# Patient Record
Sex: MALE | Race: WHITE | Employment: FULL TIME | URBAN - METROPOLITAN AREA
[De-identification: names, ages, dates, MRNs, and addresses within clinical notes are randomized per-mention and may not be internally consistent; named-entity substitution may affect disease eponyms.]

---

## 2021-11-28 ENCOUNTER — HOSPITAL ENCOUNTER (EMERGENCY)
Age: 66
Discharge: HOME | End: 2021-11-29
Attending: SPECIALIST
Payer: MEDICARE

## 2021-11-28 DIAGNOSIS — S01.21XA LACERATION OF NOSE, INITIAL ENCOUNTER: ICD-10-CM

## 2021-11-28 DIAGNOSIS — S01.81XA LACERATION OF FOREHEAD, INITIAL ENCOUNTER: Primary | ICD-10-CM

## 2021-11-28 PROCEDURE — 12014 RPR F/E/E/N/L/M 5.1-7.5 CM: CPT

## 2021-11-28 PROCEDURE — 99284 EMERGENCY DEPT VISIT MOD MDM: CPT

## 2021-11-28 ASSESSMENT — PAIN SCALES - GENERAL: PAINLEVEL_OUTOF10: 6

## 2021-11-29 ENCOUNTER — APPOINTMENT (OUTPATIENT)
Dept: GENERAL RADIOLOGY | Age: 66
End: 2021-11-29
Payer: MEDICARE

## 2021-11-29 ENCOUNTER — APPOINTMENT (OUTPATIENT)
Dept: CT IMAGING | Age: 66
End: 2021-11-29
Payer: MEDICARE

## 2021-11-29 VITALS
HEART RATE: 78 BPM | TEMPERATURE: 98.4 F | HEIGHT: 68 IN | OXYGEN SATURATION: 96 % | RESPIRATION RATE: 20 BRPM | SYSTOLIC BLOOD PRESSURE: 136 MMHG | WEIGHT: 155 LBS | DIASTOLIC BLOOD PRESSURE: 83 MMHG | BODY MASS INDEX: 23.49 KG/M2

## 2021-11-29 PROCEDURE — 90471 IMMUNIZATION ADMIN: CPT | Performed by: SPECIALIST

## 2021-11-29 PROCEDURE — 2500000003 HC RX 250 WO HCPCS: Performed by: SPECIALIST

## 2021-11-29 PROCEDURE — 6370000000 HC RX 637 (ALT 250 FOR IP): Performed by: SPECIALIST

## 2021-11-29 PROCEDURE — 72125 CT NECK SPINE W/O DYE: CPT

## 2021-11-29 PROCEDURE — 6360000002 HC RX W HCPCS: Performed by: SPECIALIST

## 2021-11-29 PROCEDURE — 70450 CT HEAD/BRAIN W/O DYE: CPT

## 2021-11-29 PROCEDURE — 90715 TDAP VACCINE 7 YRS/> IM: CPT | Performed by: SPECIALIST

## 2021-11-29 PROCEDURE — 70160 X-RAY EXAM OF NASAL BONES: CPT

## 2021-11-29 RX ORDER — CEPHALEXIN 250 MG/1
500 CAPSULE ORAL ONCE
Status: COMPLETED | OUTPATIENT
Start: 2021-11-29 | End: 2021-11-29

## 2021-11-29 RX ORDER — CEPHALEXIN 500 MG/1
500 CAPSULE ORAL 4 TIMES DAILY
Qty: 28 CAPSULE | Refills: 0 | Status: SHIPPED | OUTPATIENT
Start: 2021-11-29 | End: 2021-12-06

## 2021-11-29 RX ORDER — LIDOCAINE HYDROCHLORIDE AND EPINEPHRINE 10; 10 MG/ML; UG/ML
20 INJECTION, SOLUTION INFILTRATION; PERINEURAL ONCE
Status: COMPLETED | OUTPATIENT
Start: 2021-11-29 | End: 2021-11-29

## 2021-11-29 RX ADMIN — CEPHALEXIN 500 MG: 250 CAPSULE ORAL at 03:36

## 2021-11-29 RX ADMIN — TETANUS TOXOID, REDUCED DIPHTHERIA TOXOID AND ACELLULAR PERTUSSIS VACCINE, ADSORBED 0.5 ML: 5; 2.5; 8; 8; 2.5 SUSPENSION INTRAMUSCULAR at 00:42

## 2021-11-29 RX ADMIN — LIDOCAINE HYDROCHLORIDE AND EPINEPHRINE 20 ML: 10; 10 INJECTION, SOLUTION INFILTRATION; PERINEURAL at 00:42

## 2021-11-29 ASSESSMENT — PAIN SCALES - GENERAL: PAINLEVEL_OUTOF10: 6

## 2021-11-29 NOTE — ED PROVIDER NOTES
Brigid Wilkerson 1778 ENCOUNTER      Pt Name: Lovely Ayala  MRN: [id number]  [place] 1955  Date of evaluation: 11/29/21      CHIEF COMPLAINT       Chief Complaint   Patient presents with    Fall     tripped over a dent in the sidewalk, and had his hands in his pockets and fell hit his nose, nose dived into the cement         HISTORY OF PRESENT ILLNESS    Lovely Ayala is a 78 y.o. male who presents to the emergency department accompanied by his close friend after patient apparently tripped on the uneven sidewalk and had his hands in the pockets of his jacket and hit his nose and forehead onto the cement. No history of loss of consciousness and patient denies any headache except pain at the site of the laceration. He also denies any neck pain, tingling, numbness or weakness in any of the extremities. He also sustained abrasion on the left knee but denies any knee pain and denies any tingling, numbness or weakness distally. Last tetanus injection was in 2007. He denies any chest or abdominal injuries. He grades pain as 6 out of 10 in intensity and there are no exacerbating or relieving factors. He has not taken any medications for the pain prior to arrival.  Patient does not take any anticoagulants or antiplatelet agents and no history of diabetes mellitus. REVIEW OF SYSTEMS       Review of Systems    All systems reviewed and negative unless noted in HPI. The patient denies fever or constitutional symptoms. Denies vision change. Denies any sore throat or rhinorrhea. Denies any neck pain or stiffness. Denies chest pain or shortness of breath. No nausea,  vomiting or diarrhea. Denies any dysuria. Denies urinary frequency or hematuria. Denies musculoskeletal injury or pain. Denies any weakness, numbness or focal neurologic deficit. Denies any skin rash or edema. No recent psychiatric issues. No easy bruising or bleeding.    Denies any polyuria, polydypsia       PAST MEDICAL HISTORY    has no past medical history on file. SURGICAL HISTORY      has no past surgical history on file. CURRENT MEDICATIONS       Discharge Medication List as of 11/29/2021  3:26 AM          ALLERGIES     is allergic to sulfa antibiotics. FAMILY HISTORY     has no family status information on file. family history is not on file. SOCIAL HISTORY          PHYSICAL EXAM     INITIAL VITALS:  height is 5' 8\" (1.727 m) and weight is 70.3 kg (155 lb). His oral temperature is 98.4 °F (36.9 °C). His blood pressure is 136/83 and his pulse is 78. His respiration is 20 and oxygen saturation is 96%. Physical Exam  Vitals and nursing note reviewed. Constitutional:       Appearance: He is well-developed. HENT:      Head: Normocephalic. Laceration present. No raccoon eyes or Joya's sign. Comments: Patient has 2 forehead lacerations jagged margins approximately 4 cm and 1 cm in length and another laceration at the bridge of the nose approximately 2.5 cm in length. No evidence of foreign body, tendon or nerve involvement. Nose: Laceration present. No nasal deformity. Right Nostril: No foreign body, epistaxis or septal hematoma. Left Nostril: No foreign body, epistaxis or septal hematoma. Eyes:      Extraocular Movements: Extraocular movements intact. Pupils: Pupils are equal, round, and reactive to light. Cardiovascular:      Rate and Rhythm: Normal rate and regular rhythm. Heart sounds: Normal heart sounds. No murmur heard. Pulmonary:      Effort: Pulmonary effort is normal. No respiratory distress. Breath sounds: Normal breath sounds. Abdominal:      General: Bowel sounds are normal. There is no distension. Palpations: Abdomen is soft. Tenderness: There is no abdominal tenderness. Musculoskeletal:      Cervical back: Normal range of motion and neck supple. Skin:     General: Skin is warm and dry.       Comments: Superficial abrasion on anterior aspect of the left knee. No evidence of knee effusion. Anterior and posterior drawer test are negative. Neurovascular examination is intact distally. Neurological:      General: No focal deficit present. Mental Status: He is alert and oriented to person, place, and time. GCS: GCS eye subscore is 4. GCS verbal subscore is 5. GCS motor subscore is 6. Cranial Nerves: Cranial nerves are intact. Sensory: Sensation is intact. Motor: Motor function is intact. Coordination: Coordination is intact. DIFFERENTIAL DIAGNOSIS/ MDM:     Lacerations, contusion, fracture, intracranial bleed    DIAGNOSTIC RESULTS     EKG: All EKG's are interpreted by the Emergency Department Physician who either signs or Co-signs this chart in the absence of a cardiologist.    None obtained    RADIOLOGY:   Interpretation per the Radiologist below, if available at the time of this note:    XR NASAL BONE (MIN 3 VIEWS )    Result Date: 11/29/2021  EXAMINATION: THREE XRAY VIEWS OF THE NASAL BONES 11/29/2021 1:06 am COMPARISON: None. HISTORY: ORDERING SYSTEM PROVIDED HISTORY: Fall TECHNOLOGIST PROVIDED HISTORY: Fall Reason for Exam: Pt fell face first on cement when walking. Laceration to nose and forehead Acuity: Acute Type of Exam: Initial FINDINGS: Soft tissue injury identified with tiny radiopaque foreign body just deep to the site of the injury. There are radiopaque calcific densities anterior to the right nasal bone noted however no convincing evidence of acute displaced fracture identified. The remainder of the osseous structures involving the face appearing intact least on the frontal projection provided. Soft tissue injury with findings suspicious for radiopaque calcifications due to laceration. Please correlate exam findings. No definite acute displaced nasal fracture.      CT Head WO Contrast    Result Date: 11/29/2021  EXAMINATION: CT OF THE HEAD WITHOUT CONTRAST  11/29/2021 1:06 am TECHNIQUE: CT of the head was performed without the administration of intravenous contrast. Dose modulation, iterative reconstruction, and/or weight based adjustment of the mA/kV was utilized to reduce the radiation dose to as low as reasonably achievable. COMPARISON: None. HISTORY: ORDERING SYSTEM PROVIDED HISTORY: Head injury TECHNOLOGIST PROVIDED HISTORY: Head injury Decision Support Exception - unselect if not a suspected or confirmed emergency medical condition->Emergency Medical Condition (MA) Reason for Exam: Pt fell face first on cement when walking. Laceration to nose and forehead Acuity: Acute Type of Exam: Initial FINDINGS: BRAIN/VENTRICLES: There is no acute intracranial hemorrhage, mass effect or midline shift. No abnormal extra-axial fluid collection. The gray-white differentiation is maintained without evidence of an acute infarct. There is no evidence of hydrocephalus. Involutional changes. Minor chronic microvascular disease. Intracranial vascular calcifications. ORBITS: The visualized portion of the orbits demonstrate no acute abnormality. SINUSES: Mild ethmoid sinus thickening. Mastoids are clear. SOFT TISSUES/SKULL: No calvarial fracture. Soft tissue injury identified involving the anterior nose with tiny locules of gas. No acute intracranial abnormality. Anterior nasal soft tissue swelling partially visualized. CT Cervical Spine WO Contrast    Result Date: 11/29/2021  EXAMINATION: CT OF THE CERVICAL SPINE WITHOUT CONTRAST 11/29/2021 1:06 am TECHNIQUE: CT of the cervical spine was performed without the administration of intravenous contrast. Multiplanar reformatted images are provided for review. Dose modulation, iterative reconstruction, and/or weight based adjustment of the mA/kV was utilized to reduce the radiation dose to as low as reasonably achievable. COMPARISON: None.  HISTORY: ORDERING SYSTEM PROVIDED HISTORY: Head injury TECHNOLOGIST PROVIDED HISTORY: Head injury Decision Support Exception - unselect if not a suspected or confirmed emergency medical condition->Emergency Medical Condition (MA) Reason for Exam: Pt fell face first on cement when walking. Laceration to nose and forehead Acuity: Acute Type of Exam: Initial FINDINGS: BONES/ALIGNMENT: There is no acute fracture or traumatic malalignment. DEGENERATIVE CHANGES: Severe multilevel degenerate change. Suspect severe canal narrowing multiple levels notably at C4. Anterolisthesis C2 on C3 and C3 on C4 noted. Additional anterolisthesis of C7 on T1 measuring 5 6 mm. Moderate severe multilevel degenerate facet arthropathy. Vascular calcifications. SOFT TISSUES: There is no prevertebral soft tissue swelling. Severe multilevel degenerate change. No acute fracture traumatic malalignment. LABS:  No results found for this visit on 11/28/21. EMERGENCY DEPARTMENT COURSE:   Vitals:    Vitals:    11/28/21 2338 11/29/21 0336   BP: (!) 141/117 136/83   Pulse: 83 78   Resp: 16 20   Temp: 98.4 °F (36.9 °C)    TempSrc: Oral    SpO2: 96% 96%   Weight: 70.3 kg (155 lb)    Height: 5' 8\" (1.727 m)      -------------------------  BP: 136/83, Temp: 98.4 °F (36.9 °C), Pulse: 78, Resp: 20    Orders Placed This Encounter   Medications    Tetanus-Diphth-Acell Pertussis (BOOSTRIX) injection 0.5 mL    lidocaine-EPINEPHrine 1 %[id number] injection 20 mL    cephALEXin (KEFLEX) capsule 500 mg     Order Specific Question:   Antimicrobial Indications     Answer:   Surgical Prophylaxis    cephALEXin (KEFLEX) 500 MG capsule     Sig: Take 1 capsule by mouth 4 times daily for 7 days     Dispense:  28 capsule     Refill:  0         During the ED course, patient was given a Boostrix 0.5 mill intramuscularly and lacerations were suture repaired by myself. Please see procedure note. He was given Keflex 500 mg orally and then discharged home on Keflex for 7 days course.   Wound care instructions were given and patient

## 2024-01-08 ENCOUNTER — APPOINTMENT (OUTPATIENT)
Dept: CT IMAGING | Age: 69
DRG: 472 | End: 2024-01-08
Payer: MEDICARE

## 2024-01-08 ENCOUNTER — HOSPITAL ENCOUNTER (INPATIENT)
Age: 69
LOS: 2 days | Discharge: ANOTHER HOSPITAL | DRG: 472 | End: 2024-01-12
Attending: EMERGENCY MEDICINE | Admitting: STUDENT IN AN ORGANIZED HEALTH CARE EDUCATION/TRAINING PROGRAM
Payer: MEDICARE

## 2024-01-08 ENCOUNTER — APPOINTMENT (OUTPATIENT)
Dept: GENERAL RADIOLOGY | Age: 69
DRG: 472 | End: 2024-01-08
Payer: MEDICARE

## 2024-01-08 DIAGNOSIS — R53.1 GENERALIZED WEAKNESS: ICD-10-CM

## 2024-01-08 DIAGNOSIS — R79.89 ELEVATED TROPONIN: ICD-10-CM

## 2024-01-08 DIAGNOSIS — R21 RASH AND OTHER NONSPECIFIC SKIN ERUPTION: ICD-10-CM

## 2024-01-08 DIAGNOSIS — R29.6 MULTIPLE FALLS: ICD-10-CM

## 2024-01-08 DIAGNOSIS — S42.121A DISPLACED FRACTURE OF ACROMIAL PROCESS, RIGHT SHOULDER, INITIAL ENCOUNTER FOR CLOSED FRACTURE: Primary | ICD-10-CM

## 2024-01-08 PROBLEM — F10.10 ALCOHOL ABUSE, UNCOMPLICATED: Status: ACTIVE | Noted: 2022-05-14

## 2024-01-08 PROBLEM — E78.5 HYPERLIPIDEMIA, UNSPECIFIED: Status: ACTIVE | Noted: 2023-08-02

## 2024-01-08 PROBLEM — I10 ESSENTIAL (PRIMARY) HYPERTENSION: Status: ACTIVE | Noted: 2023-08-02

## 2024-01-08 PROBLEM — R53.81 PHYSICAL DEBILITY: Status: ACTIVE | Noted: 2024-01-08

## 2024-01-08 LAB
ALBUMIN SERPL-MCNC: 4.5 G/DL (ref 3.5–5.2)
ALBUMIN/GLOB SERPL: 1.5 {RATIO} (ref 1–2.5)
ALP SERPL-CCNC: 132 U/L (ref 40–129)
ALT SERPL-CCNC: 29 U/L (ref 5–41)
ANION GAP SERPL CALCULATED.3IONS-SCNC: 14 MMOL/L (ref 9–17)
AST SERPL-CCNC: 39 U/L
BACTERIA URNS QL MICRO: ABNORMAL
BASOPHILS # BLD: 0.07 K/UL (ref 0–0.2)
BASOPHILS NFR BLD: 1 % (ref 0–2)
BILIRUB SERPL-MCNC: 0.6 MG/DL (ref 0.3–1.2)
BILIRUB UR QL STRIP: ABNORMAL
BUN SERPL-MCNC: 21 MG/DL (ref 8–23)
CALCIUM SERPL-MCNC: 9.7 MG/DL (ref 8.6–10.4)
CHARACTER UR: ABNORMAL
CHLORIDE SERPL-SCNC: 97 MMOL/L (ref 98–107)
CLARITY UR: CLEAR
CO2 SERPL-SCNC: 24 MMOL/L (ref 20–31)
COLOR UR: YELLOW
CREAT SERPL-MCNC: 0.6 MG/DL (ref 0.7–1.2)
EOSINOPHIL # BLD: 0.07 K/UL (ref 0–0.4)
EOSINOPHILS RELATIVE PERCENT: 1 % (ref 1–4)
EPI CELLS #/AREA URNS HPF: ABNORMAL /HPF (ref 0–5)
ERYTHROCYTE [DISTWIDTH] IN BLOOD BY AUTOMATED COUNT: 20.5 % (ref 12.5–15.4)
ETHANOL PERCENT: <0.01 %
ETHANOLAMINE SERPL-MCNC: <10 MG/DL
GFR SERPL CREATININE-BSD FRML MDRD: >60 ML/MIN/1.73M2
GLUCOSE SERPL-MCNC: 84 MG/DL (ref 70–99)
GLUCOSE UR STRIP-MCNC: NEGATIVE MG/DL
HCT VFR BLD AUTO: 38.8 % (ref 41–53)
HGB BLD-MCNC: 13 G/DL (ref 13.5–17.5)
HGB UR QL STRIP.AUTO: NEGATIVE
KETONES UR STRIP-MCNC: ABNORMAL MG/DL
LEUKOCYTE ESTERASE UR QL STRIP: NEGATIVE
LYMPHOCYTES NFR BLD: 0.99 K/UL (ref 1–4.8)
LYMPHOCYTES RELATIVE PERCENT: 15 % (ref 24–44)
MAGNESIUM SERPL-MCNC: 2.2 MG/DL (ref 1.6–2.6)
MCH RBC QN AUTO: 25.9 PG (ref 26–34)
MCHC RBC AUTO-ENTMCNC: 33.5 G/DL (ref 31–37)
MCV RBC AUTO: 77.4 FL (ref 80–100)
MONOCYTES NFR BLD: 0.53 K/UL (ref 0.1–1.2)
MONOCYTES NFR BLD: 8 % (ref 2–11)
MORPHOLOGY: ABNORMAL
NEUTROPHILS NFR BLD: 75 % (ref 36–66)
NEUTS SEG NFR BLD: 4.94 K/UL (ref 1.8–7.7)
NITRITE UR QL STRIP: NEGATIVE
PH UR STRIP: 6 [PH] (ref 5–8)
PLATELET # BLD AUTO: 444 K/UL (ref 140–450)
PMV BLD AUTO: 6.7 FL (ref 6–12)
POTASSIUM SERPL-SCNC: 3.9 MMOL/L (ref 3.7–5.3)
PROT SERPL-MCNC: 7.6 G/DL (ref 6.4–8.3)
PROT UR STRIP-MCNC: NEGATIVE MG/DL
RBC # BLD AUTO: 5.02 M/UL (ref 4.5–5.9)
RBC #/AREA URNS HPF: ABNORMAL /HPF (ref 0–2)
SODIUM SERPL-SCNC: 135 MMOL/L (ref 135–144)
SP GR UR STRIP: 1.03 (ref 1–1.03)
TROPONIN I SERPL HS-MCNC: 29 NG/L (ref 0–22)
TROPONIN I SERPL HS-MCNC: 32 NG/L (ref 0–22)
UROBILINOGEN UR STRIP-ACNC: NORMAL EU/DL (ref 0–1)
WBC #/AREA URNS HPF: ABNORMAL /HPF (ref 0–5)
WBC OTHER # BLD: 6.6 K/UL (ref 3.5–11)

## 2024-01-08 PROCEDURE — 6360000002 HC RX W HCPCS: Performed by: NURSE PRACTITIONER

## 2024-01-08 PROCEDURE — 96374 THER/PROPH/DIAG INJ IV PUSH: CPT

## 2024-01-08 PROCEDURE — 99285 EMERGENCY DEPT VISIT HI MDM: CPT

## 2024-01-08 PROCEDURE — 73030 X-RAY EXAM OF SHOULDER: CPT

## 2024-01-08 PROCEDURE — 99222 1ST HOSP IP/OBS MODERATE 55: CPT | Performed by: STUDENT IN AN ORGANIZED HEALTH CARE EDUCATION/TRAINING PROGRAM

## 2024-01-08 PROCEDURE — 6370000000 HC RX 637 (ALT 250 FOR IP): Performed by: STUDENT IN AN ORGANIZED HEALTH CARE EDUCATION/TRAINING PROGRAM

## 2024-01-08 PROCEDURE — 96372 THER/PROPH/DIAG INJ SC/IM: CPT

## 2024-01-08 PROCEDURE — 71045 X-RAY EXAM CHEST 1 VIEW: CPT

## 2024-01-08 PROCEDURE — 73562 X-RAY EXAM OF KNEE 3: CPT

## 2024-01-08 PROCEDURE — G0480 DRUG TEST DEF 1-7 CLASSES: HCPCS

## 2024-01-08 PROCEDURE — G0378 HOSPITAL OBSERVATION PER HR: HCPCS

## 2024-01-08 PROCEDURE — 84550 ASSAY OF BLOOD/URIC ACID: CPT

## 2024-01-08 PROCEDURE — 1200000000 HC SEMI PRIVATE

## 2024-01-08 PROCEDURE — 36415 COLL VENOUS BLD VENIPUNCTURE: CPT

## 2024-01-08 PROCEDURE — 2580000003 HC RX 258: Performed by: STUDENT IN AN ORGANIZED HEALTH CARE EDUCATION/TRAINING PROGRAM

## 2024-01-08 PROCEDURE — 85025 COMPLETE CBC W/AUTO DIFF WBC: CPT

## 2024-01-08 PROCEDURE — 83735 ASSAY OF MAGNESIUM: CPT

## 2024-01-08 PROCEDURE — 81001 URINALYSIS AUTO W/SCOPE: CPT

## 2024-01-08 PROCEDURE — 84484 ASSAY OF TROPONIN QUANT: CPT

## 2024-01-08 PROCEDURE — 70450 CT HEAD/BRAIN W/O DYE: CPT

## 2024-01-08 PROCEDURE — 6360000002 HC RX W HCPCS: Performed by: STUDENT IN AN ORGANIZED HEALTH CARE EDUCATION/TRAINING PROGRAM

## 2024-01-08 PROCEDURE — 80053 COMPREHEN METABOLIC PANEL: CPT

## 2024-01-08 PROCEDURE — 6370000000 HC RX 637 (ALT 250 FOR IP): Performed by: NURSE PRACTITIONER

## 2024-01-08 PROCEDURE — 72125 CT NECK SPINE W/O DYE: CPT

## 2024-01-08 PROCEDURE — 93005 ELECTROCARDIOGRAM TRACING: CPT | Performed by: NURSE PRACTITIONER

## 2024-01-08 RX ORDER — POTASSIUM CHLORIDE 20 MEQ/1
40 TABLET, EXTENDED RELEASE ORAL PRN
Status: DISCONTINUED | OUTPATIENT
Start: 2024-01-08 | End: 2024-01-12 | Stop reason: HOSPADM

## 2024-01-08 RX ORDER — ATORVASTATIN CALCIUM 10 MG/1
10 TABLET, FILM COATED ORAL DAILY
Status: DISCONTINUED | OUTPATIENT
Start: 2024-01-08 | End: 2024-01-12 | Stop reason: HOSPADM

## 2024-01-08 RX ORDER — POLYETHYLENE GLYCOL 3350 17 G/17G
17 POWDER, FOR SOLUTION ORAL DAILY PRN
Status: DISCONTINUED | OUTPATIENT
Start: 2024-01-08 | End: 2024-01-12 | Stop reason: HOSPADM

## 2024-01-08 RX ORDER — ATORVASTATIN CALCIUM 10 MG/1
10 TABLET, FILM COATED ORAL NIGHTLY
COMMUNITY
Start: 2023-12-30

## 2024-01-08 RX ORDER — SODIUM CHLORIDE 9 MG/ML
INJECTION, SOLUTION INTRAVENOUS PRN
Status: DISCONTINUED | OUTPATIENT
Start: 2024-01-08 | End: 2024-01-12 | Stop reason: HOSPADM

## 2024-01-08 RX ORDER — PREDNISONE 20 MG/1
20 TABLET ORAL DAILY
Status: DISCONTINUED | OUTPATIENT
Start: 2024-01-09 | End: 2024-01-09

## 2024-01-08 RX ORDER — MAGNESIUM SULFATE IN WATER 40 MG/ML
2000 INJECTION, SOLUTION INTRAVENOUS PRN
Status: DISCONTINUED | OUTPATIENT
Start: 2024-01-08 | End: 2024-01-12 | Stop reason: HOSPADM

## 2024-01-08 RX ORDER — SODIUM CHLORIDE 0.9 % (FLUSH) 0.9 %
5-40 SYRINGE (ML) INJECTION EVERY 12 HOURS SCHEDULED
Status: DISCONTINUED | OUTPATIENT
Start: 2024-01-08 | End: 2024-01-12 | Stop reason: HOSPADM

## 2024-01-08 RX ORDER — ACETAMINOPHEN 650 MG/1
650 SUPPOSITORY RECTAL EVERY 6 HOURS PRN
Status: DISCONTINUED | OUTPATIENT
Start: 2024-01-08 | End: 2024-01-12 | Stop reason: HOSPADM

## 2024-01-08 RX ORDER — SODIUM CHLORIDE 0.9 % (FLUSH) 0.9 %
5-40 SYRINGE (ML) INJECTION PRN
Status: DISCONTINUED | OUTPATIENT
Start: 2024-01-08 | End: 2024-01-12 | Stop reason: HOSPADM

## 2024-01-08 RX ORDER — POTASSIUM CHLORIDE 7.45 MG/ML
10 INJECTION INTRAVENOUS PRN
Status: DISCONTINUED | OUTPATIENT
Start: 2024-01-08 | End: 2024-01-12 | Stop reason: HOSPADM

## 2024-01-08 RX ORDER — HYDROCODONE BITARTRATE AND ACETAMINOPHEN 5; 325 MG/1; MG/1
1 TABLET ORAL EVERY 6 HOURS PRN
Status: DISCONTINUED | OUTPATIENT
Start: 2024-01-08 | End: 2024-01-12 | Stop reason: HOSPADM

## 2024-01-08 RX ORDER — ACETAMINOPHEN 325 MG/1
650 TABLET ORAL EVERY 6 HOURS PRN
Status: DISCONTINUED | OUTPATIENT
Start: 2024-01-08 | End: 2024-01-12 | Stop reason: HOSPADM

## 2024-01-08 RX ORDER — ONDANSETRON 4 MG/1
4 TABLET, ORALLY DISINTEGRATING ORAL EVERY 8 HOURS PRN
Status: DISCONTINUED | OUTPATIENT
Start: 2024-01-08 | End: 2024-01-12 | Stop reason: HOSPADM

## 2024-01-08 RX ORDER — HYDROCODONE BITARTRATE AND ACETAMINOPHEN 5; 325 MG/1; MG/1
2 TABLET ORAL EVERY 6 HOURS PRN
Status: DISCONTINUED | OUTPATIENT
Start: 2024-01-08 | End: 2024-01-12 | Stop reason: HOSPADM

## 2024-01-08 RX ORDER — ONDANSETRON 2 MG/ML
4 INJECTION INTRAMUSCULAR; INTRAVENOUS EVERY 6 HOURS PRN
Status: DISCONTINUED | OUTPATIENT
Start: 2024-01-08 | End: 2024-01-12 | Stop reason: HOSPADM

## 2024-01-08 RX ORDER — ENOXAPARIN SODIUM 100 MG/ML
40 INJECTION SUBCUTANEOUS DAILY
Status: DISCONTINUED | OUTPATIENT
Start: 2024-01-08 | End: 2024-01-12 | Stop reason: HOSPADM

## 2024-01-08 RX ORDER — IBUPROFEN 600 MG/1
600 TABLET ORAL EVERY 6 HOURS PRN
Status: ON HOLD | COMMUNITY
Start: 2024-01-04 | End: 2024-01-19 | Stop reason: HOSPADM

## 2024-01-08 RX ADMIN — SODIUM CHLORIDE, PRESERVATIVE FREE 10 ML: 5 INJECTION INTRAVENOUS at 21:17

## 2024-01-08 RX ADMIN — ATORVASTATIN CALCIUM 10 MG: 10 TABLET, FILM COATED ORAL at 21:16

## 2024-01-08 RX ADMIN — METHYLPREDNISOLONE SODIUM SUCCINATE 60 MG: 125 INJECTION, POWDER, FOR SOLUTION INTRAMUSCULAR; INTRAVENOUS at 16:17

## 2024-01-08 RX ADMIN — HYDROCODONE BITARTRATE AND ACETAMINOPHEN 2 TABLET: 5; 325 TABLET ORAL at 21:15

## 2024-01-08 RX ADMIN — ENOXAPARIN SODIUM 40 MG: 100 INJECTION SUBCUTANEOUS at 21:16

## 2024-01-08 ASSESSMENT — ENCOUNTER SYMPTOMS
CONSTIPATION: 0
SHORTNESS OF BREATH: 0
NAUSEA: 0
BACK PAIN: 0
VOMITING: 0
EYE DISCHARGE: 0
COUGH: 0
DIARRHEA: 0
ABDOMINAL PAIN: 0

## 2024-01-08 ASSESSMENT — PAIN SCALES - GENERAL
PAINLEVEL_OUTOF10: 8
PAINLEVEL_OUTOF10: 3
PAINLEVEL_OUTOF10: 8

## 2024-01-08 ASSESSMENT — PAIN - FUNCTIONAL ASSESSMENT: PAIN_FUNCTIONAL_ASSESSMENT: 0-10

## 2024-01-08 NOTE — H&P
[facility]  Office: [phone]  Chapin Muse DO, Cyrus Dunn DO, Franky Jules DO, Danny Sexton DO, Luis Angel Gracia MD, Julee Quiñones MD, Delroy Landeros MD, Madison Collier MD,  Tony Rosales MD, Joni Dubois MD, Soila Oliva MD,  Neftali Larkin DO, Alvaro Maria MD, Juan Luis Faustin MD, Torin Muse DO, Eden Barajas MD,  Marquis Lemus DO, Yarely Molina MD, Maya West MD, Karen Adler MD, Shyam Beckman MD,  Rojas Neri MD, Axel Tello MD, Ryan Batista MD, Tima Hoffmann MD, Michael Grimes MD, Angus Hunt MD, Ramiro Burns DO, Bhupinder Mao DO, Mari Villasenor MD,  Davion Graves MD, Shirley Waterhouse, CNP,  Alycia De La Fuente, CNP, Tavon Wtakins, CNP,  Katharine Merrill, LACHO, Laura Small, CNP, Brenda Jade, CNP, Malena Rossi CNP, Marya Watson, CNP, Kortney Wilson, CNP, Zabrina Obrien, PA-C, Soila Barber PA-C, Tia Mohan, CNP, Betina Saldivar, CNS, Carmen Worley, CNP, Mamie Crain, CNP, Tracy Schwab, CNP         [facility]   IN-PATIENT SERVICE   [facility]    HISTORY AND PHYSICAL EXAMINATION            Date:   1/8/2024  Patient name:  Moises Triplett  Date of admission:  1/8/2024  1:31 PM  MRN:   [id number]  Account:  [id number]  YOB: 1955  PCP:    No primary care provider on file.  Room:   ER11/ER11  Code Status:    No Order      History Obtained From:     patient    History of Present Illness:     Moises Triplett is a 68 y.o. male with a past medical history of hyperlipidemia who presented to the emergency department on 1/8/2024 complaining of frequent falls at home. The patient states that he has fallen several times over the past few days and feels like he needs to go to rehab again. He endorses frequent headaches and dizziness that he believes are causing his falls. In the ED, the patient was afebrile and nontoxic appearing. CT head and cervical spine were negative for an acute abnormality. X-ray of the right shoulder  joint. Right knee: 1. Mild degenerative changes of the medial compartment. 2. No acute fracture or dislocation. Left knee: 1. Mild degenerative change of the medial compartment. 2. No acute fracture or dislocation.     XR CHEST PORTABLE    Result Date: 1/8/2024  No acute cardiopulmonary process.     CT CERVICAL SPINE WO CONTRAST    Result Date: 1/8/2024  1. No definite acute osseous abnormality of the cervical spine. 2. Extensive multilevel DJD/DDD changes, slightly increased as compared to 2021 with associated findings, as above.     CT Head W/O Contrast    Result Date: 1/8/2024  No acute intracranial abnormality.       Assessment :      Hospital Problems             Last Modified POA    * (Principal) Physical debility 1/8/2024 Yes    Alcohol abuse, uncomplicated 1/8/2024 Yes    Essential (primary) hypertension 1/8/2024 Yes    Hyperlipidemia, unspecified 1/8/2024 Yes       Plan:     Physical debility   -The patient states that he has been falling frequently and does not feel that he can be safely discharged home   -The patient is interested in ARU placement   -The patient states that his falls are secondary to headaches and dizziness   -MRI of the brain and cervical spine are pending   -Will consult neurology per patient request     Right scapula fracture   -X-ray of the right shoulder is showing a suspected nondisplaced fracture at the junction of the acromion and scapula   -This is likely a non-operative fracture   -Consult orthopedic surgery; appreciate recommendations     Elevated troponin   -The patient is chest-pain free  -Will obtain an echocardiogram to assess for wall motion abnormalities     Dermatitis   -The patient has a faint rash on his trunk and extremities   -Start prednisone 40 mg daily and monitor for improvement     Patient is admitted as observation status. Expected length of stay < 48 hours. Patient is admitted in the Med/Surge      Juan Luis Faustin MD  1/8/2024  6:24 PM    Copy sent to Dr. Deluna  primary care provider on file.

## 2024-01-08 NOTE — ED PROVIDER NOTES
[address]    [address] [facility] [address]  Phone: [phone]  Emergency Department  Faculty Attestation    I performed a history and physical examination of the patient and discussed management with the mid level provider. I reviewed the mid level provider's note and agree with the documented findings and plan of care. Any areas of disagreement are noted on the chart. I was personally present for the key portions of any procedures. I have documented in the chart those procedures where I was not present during the key portions. I have reviewed the emergency nurses triage note. I agree with the chief complaint, past medical history, past surgical history, allergies, medications, social and family history as documented unless otherwise noted below. Documentation of the HPI, Physical Exam and Medical Decision Making performed by medical students or scribes is based on my personal performance of the HPI, PE and MDM. For Physician Assistant/ Nurse Practitioner cases/documentation I have personally evaluated this patient and have completed at least one if not all key elements of the E/M (history, physical exam, and MDM). Additional findings are as noted.      Primary Care Physician:  No primary care provider on file.    CHIEF COMPLAINT       Chief Complaint   Patient presents with    Fall     Fall in shower, has had increase in falls over the past month only pain is R knee       RECENT VITALS:   Temp: 97.9 °F (36.6 °C),  Pulse: 77, Respirations: 18, BP: 121/76    LABS:  Labs Reviewed   CBC WITH AUTO DIFFERENTIAL - Abnormal; Notable for the following components:       Result Value    Hemoglobin 13.0 (*)     Hematocrit 38.8 (*)     MCV 77.4 (*)     MCH 25.9 (*)     RDW 20.5 (*)     Neutrophils % 75 (*)     Lymphocytes % 15 (*)     Lymphocytes Absolute 0.99 (*)     All other components within normal limits   COMPREHENSIVE METABOLIC PANEL - Abnormal; Notable for the following components:    Chloride 97  (*)     Creatinine 0.6 (*)     Alkaline Phosphatase 132 (*)     All other components within normal limits   URINALYSIS - Abnormal; Notable for the following components:    Bilirubin Urine SMALL (*)     Ketones, Urine MODERATE (*)     All other components within normal limits   TROPONIN - Abnormal; Notable for the following components:    Troponin, High Sensitivity 32 (*)     All other components within normal limits   TROPONIN - Abnormal; Notable for the following components:    Troponin, High Sensitivity 29 (*)     All other components within normal limits   BASIC METABOLIC PANEL W/ REFLEX TO MG FOR LOW K - Abnormal; Notable for the following components:    Sodium 134 (*)     BUN 26 (*)     Creatinine 0.6 (*)     All other components within normal limits   CBC WITH AUTO DIFFERENTIAL - Abnormal; Notable for the following components:    Hemoglobin 12.1 (*)     Hematocrit 35.9 (*)     MCV 77.4 (*)     RDW 20.5 (*)     Platelets 478 (*)     Neutrophils % 82 (*)     Lymphocytes % 17 (*)     Eosinophils % 0 (*)     Lymphocytes Absolute 0.88 (*)     Monocytes Absolute 0.05 (*)     All other components within normal limits   MICROSCOPIC URINALYSIS - Abnormal; Notable for the following components:    Other Observations UA   (*)     Value: Utilizing a urinalysis as the only screening method to exclude a potential uropathogen can be unreliable in many patient populations.  Rapid screening tests are less sensitive than culture and if UTI is a clinical possibility, culture should be considered despite a negative urinalysis.      All other components within normal limits   MAGNESIUM   ETHANOL   VITAMIN B12 & FOLATE   URIC ACID        XR CHEST PORTABLE (Final result)  Result time 01/08/24 15:12:00  Final result by Desmond Marks MD (01/08/24 15:12:00)                Impression:    No acute cardiopulmonary process.             Narrative:    EXAMINATION:   ONE XRAY VIEW OF THE CHEST     1/8/2024 2:26 pm     COMPARISON:   None.  acute intracranial   hemorrhage, mass effect or midline shift.  No abnormal extra-axial fluid   collection.  The gray-white differentiation is maintained without evidence of   an acute infarct.  There is mild diffuse parenchymal atrophy.  There is no   evidence of hydrocephalus. Mild bilateral periventricular hypodensities are   nonspecific, but compatible with chronic microvascular ischemic change.     ORBITS: The visualized portion of the orbits demonstrate no acute abnormality.     SINUSES: The visualized paranasal sinuses and mastoid air cells demonstrate   no acute abnormality.     SOFT TISSUES/SKULL:  No acute abnormality of the visualized skull or soft   tissues.                       CT CERVICAL SPINE WO CONTRAST (Final result)  Result time 01/08/24 15:06:27  Procedure changed from XR CERVICAL SPINE (2-3 VIEWS)  Final result by Terence Hay MD (01/08/24 15:06:27)                Impression:    1. No definite acute osseous abnormality of the cervical spine.   2. Extensive multilevel DJD/DDD changes, slightly increased as compared to   2021 with associated findings, as above.             Narrative:    EXAMINATION:   CT OF THE CERVICAL SPINE WITHOUT CONTRAST 1/8/2024 2:25 pm     TECHNIQUE:   CT of the cervical spine was performed without the administration of   intravenous contrast. Multiplanar reformatted images are provided for review.   Automated exposure control, iterative reconstruction, and/or weight based   adjustment of the mA/kV was utilized to reduce the radiation dose to as low   as reasonably achievable.     COMPARISON:   CT of the cervical spine from 11/29/2021     HISTORY:   ORDERING SYSTEM PROVIDED HISTORY: fall injury pain   TECHNOLOGIST PROVIDED HISTORY:   fall injury pain   Decision Support Exception - unselect if not a suspected or confirmed   emergency medical condition->Emergency Medical Condition (MA)   Reason for Exam: pt states repetitive falls, headaches, pain     FINDINGS:

## 2024-01-08 NOTE — ED PROVIDER NOTES
[address]  EMERGENCY DEPARTMENT ENCOUNTER      Pt Name: Moises Triplett  MRN: [id number]  Birthdate 1955  Date of evaluation: 1/8/2024  Provider: BRITTA eHdrick CNP  9:01 PM    CHIEF COMPLAINT       Chief Complaint   Patient presents with    Fall     Fall in shower, has had increase in falls over the past month only pain is R knee         HISTORY OF PRESENT ILLNESS    Moises Triplett is a 68 y.o. male who presents to the emergency department      This is a nontoxic but chronically ill-appearing 68-year-old male being sent to the emergency department via EMS from home where he lives alone, he was found on the ground near his shower by his home health aide, reports that he has had 3 falls this week and has had increasing weakness, denies that he had loss of consciousness; he follows with wound care for ongoing wound on left ankle at [facility], reports that he did follows with rheumatology at [facility] as well, he has been told that he has rheumatoid arthritis but also has been told that he does not and that he has osteoarthritis he has acute on chronic right shoulder pain post one of his falls this week, he is not anticoagulated, he denies any has had any recent fevers or chills cough or cold symptoms, states that he does have slight headache but not the worst headache of his life or sudden onset headache.  The patient reports that he has increase in bilateral knee pain as well that is making mobility difficult, he has a walker at home.  The patient admits to drinking 1 beer daily.  Admits to marijuana smoking daily.  Is a reformed tobacco smoker.  The patient's brother is at the bedside with the patient.    The history is provided by the patient and a relative.       Nursing Notes were reviewed.    REVIEW OF SYSTEMS       Review of Systems   Constitutional:  Positive for fatigue. Negative for chills and fever.        Positive for mechanical falls multiple over the past week.   HENT:  Negative for congestion, ear pain  abnormalities does not show any leukocytosis, CMP negative for acute abnormalities, high-sensitivity troponin initially 32 with 2-hour high-sensitivity troponin was added to the patient's workup.  Normal magnesium level negative serum alcohol level, urinalysis remains pending.    One-view portable chest x-ray shows no acute cardiopulmonary abnormalities.    Right shoulder x-ray shows suspected nondisplaced fracture at the junction of the acromion and scapula severe right glenohumeral osteoarthritis moderate to severe degenerative changes right AC joint.  Bilateral knees x-rays show mild degenerative changes of the medial compartment no acute fractures or dislocation.    Right upper extremity sling ordered.    CT of the brain without contrast shows no acute intracranial abnormalities.    CT of the cervical spine without contrast shows no definite acute osseous abnormalities of the cervical spine extensive multilevel degenerative disc disease/degenerative joint disease slightly increased compared to the 2021 associated findings.    The patient was updated on all the diagnostic test findings as well as the brother at the bedside, due to the patient having multiple falls, increasing generalized weakness, discussed the case with the hospitalist physician Dr. Faustin who also evaluated the patient in the emergency department, updated on HPI, diagnostic test findings, assessment findings, is agreeable for observation admission to the hospital.  Urinalysis remains pending at the time of admission.    Amount and/or Complexity of Data Reviewed  Labs: ordered.  Radiology: ordered.  ECG/medicine tests: ordered.    Risk  Prescription drug management.  Decision regarding hospitalization.            REASSESSMENT          CRITICAL CARE TIME   Total Critical Care time was  minutes, excluding separately reportable procedures.  There was a high probability of clinically significant/life threatening deterioration in the patient's  condition which required my urgent intervention.      CONSULTS:  IP CONSULT TO ORTHOPEDIC SURGERY  IP CONSULT TO NEUROLOGY    PROCEDURES:  Unless otherwise noted below, none     Procedures        FINAL IMPRESSION      1. Displaced fracture of acromial process, right shoulder, initial encounter for closed fracture    2. Multiple falls    3. Generalized weakness    4. Rash and other nonspecific skin eruption    5. Elevated troponin          DISPOSITION/PLAN   DISPOSITION Admitted 01/08/2024 04:46:32 PM      PATIENT REFERRED TO:  No follow-up provider specified.    DISCHARGE MEDICATIONS:  Current Discharge Medication List        Controlled Substances Monitoring:          No data to display                (Please note that portions of this note were completed with a voice recognition program.  Efforts were made to edit the dictations but occasionally words are mis-transcribed.)    BRITTA Hedrick CNP (electronically signed)  Attending Emergency Physician           Patria Jarvis APRN - CNP  01/08/24 7918

## 2024-01-09 ENCOUNTER — APPOINTMENT (OUTPATIENT)
Dept: MRI IMAGING | Age: 69
DRG: 472 | End: 2024-01-09
Payer: MEDICARE

## 2024-01-09 ENCOUNTER — APPOINTMENT (OUTPATIENT)
Age: 69
DRG: 472 | End: 2024-01-09
Attending: STUDENT IN AN ORGANIZED HEALTH CARE EDUCATION/TRAINING PROGRAM
Payer: MEDICARE

## 2024-01-09 PROBLEM — S42.121A: Status: ACTIVE | Noted: 2024-01-09

## 2024-01-09 LAB
25(OH)D3 SERPL-MCNC: 32.1 NG/ML
AMPHET UR QL SCN: NEGATIVE
ANION GAP SERPL CALCULATED.3IONS-SCNC: 11 MMOL/L (ref 9–17)
BARBITURATES UR QL SCN: NEGATIVE
BASOPHILS # BLD: 0 K/UL (ref 0–0.2)
BASOPHILS NFR BLD: 0 % (ref 0–2)
BENZODIAZ UR QL: NEGATIVE
BUN SERPL-MCNC: 26 MG/DL (ref 8–23)
CALCIUM SERPL-MCNC: 9.1 MG/DL (ref 8.6–10.4)
CANNABINOIDS UR QL SCN: POSITIVE
CHLORIDE SERPL-SCNC: 99 MMOL/L (ref 98–107)
CO2 SERPL-SCNC: 24 MMOL/L (ref 20–31)
COCAINE UR QL SCN: NEGATIVE
CREAT SERPL-MCNC: 0.6 MG/DL (ref 0.7–1.2)
ECHO AO ROOT DIAM: 3.7 CM
ECHO AO ROOT INDEX: 2.1 CM/M2
ECHO AV AREA PEAK VELOCITY: 2.6 CM2
ECHO AV AREA VTI: 2.4 CM2
ECHO AV AREA/BSA PEAK VELOCITY: 1.5 CM2/M2
ECHO AV AREA/BSA VTI: 1.4 CM2/M2
ECHO AV MEAN GRADIENT: 5 MMHG
ECHO AV MEAN VELOCITY: 1.1 M/S
ECHO AV PEAK GRADIENT: 10 MMHG
ECHO AV PEAK VELOCITY: 1.6 M/S
ECHO AV VELOCITY RATIO: 0.75
ECHO AV VTI: 34.7 CM
ECHO BSA: 1.75 M2
ECHO EST RA PRESSURE: 5 MMHG
ECHO IVC PROX: 1.2 CM
ECHO LA AREA 2C: 18.7 CM2
ECHO LA AREA 4C: 13.5 CM2
ECHO LA DIAMETER INDEX: 2.33 CM/M2
ECHO LA DIAMETER: 4.1 CM
ECHO LA MAJOR AXIS: 4.5 CM
ECHO LA MINOR AXIS: 5.6 CM
ECHO LA TO AORTIC ROOT RATIO: 1.11
ECHO LA VOL BP: 45 ML (ref 18–58)
ECHO LA VOL MOD A2C: 51 ML (ref 18–58)
ECHO LA VOL MOD A4C: 33 ML (ref 18–58)
ECHO LA VOL/BSA BIPLANE: 26 ML/M2 (ref 16–34)
ECHO LA VOLUME INDEX MOD A2C: 29 ML/M2 (ref 16–34)
ECHO LA VOLUME INDEX MOD A4C: 19 ML/M2 (ref 16–34)
ECHO LV E' LATERAL VELOCITY: 18 CM/S
ECHO LV E' SEPTAL VELOCITY: 11 CM/S
ECHO LV EDV A2C: 65 ML
ECHO LV EDV A4C: 66 ML
ECHO LV EDV INDEX A4C: 38 ML/M2
ECHO LV EDV NDEX A2C: 37 ML/M2
ECHO LV EJECTION FRACTION A2C: 58 %
ECHO LV EJECTION FRACTION A4C: 54 %
ECHO LV EJECTION FRACTION BIPLANE: 55 % (ref 55–100)
ECHO LV ESV A2C: 28 ML
ECHO LV ESV A4C: 30 ML
ECHO LV ESV INDEX A2C: 16 ML/M2
ECHO LV ESV INDEX A4C: 17 ML/M2
ECHO LV FRACTIONAL SHORTENING: 35 % (ref 28–44)
ECHO LV INTERNAL DIMENSION DIASTOLE INDEX: 2.44 CM/M2
ECHO LV INTERNAL DIMENSION DIASTOLIC: 4.3 CM (ref 4.2–5.9)
ECHO LV INTERNAL DIMENSION SYSTOLIC INDEX: 1.59 CM/M2
ECHO LV INTERNAL DIMENSION SYSTOLIC: 2.8 CM
ECHO LV IVSD: 1.1 CM (ref 0.6–1)
ECHO LV MASS 2D: 162.9 G (ref 88–224)
ECHO LV MASS INDEX 2D: 92.6 G/M2 (ref 49–115)
ECHO LV POSTERIOR WALL DIASTOLIC: 1.1 CM (ref 0.6–1)
ECHO LV RELATIVE WALL THICKNESS RATIO: 0.51
ECHO LVOT AREA: 3.5 CM2
ECHO LVOT AV VTI INDEX: 0.7
ECHO LVOT DIAM: 2.1 CM
ECHO LVOT MEAN GRADIENT: 3 MMHG
ECHO LVOT PEAK GRADIENT: 5 MMHG
ECHO LVOT PEAK VELOCITY: 1.2 M/S
ECHO LVOT STROKE VOLUME INDEX: 47.6 ML/M2
ECHO LVOT SV: 83.8 ML
ECHO LVOT VTI: 24.2 CM
ECHO MV A VELOCITY: 0.76 M/S
ECHO MV AREA VTI: 2.7 CM2
ECHO MV E DECELERATION TIME (DT): 338 MS
ECHO MV E VELOCITY: 0.65 M/S
ECHO MV E/A RATIO: 0.86
ECHO MV E/E' LATERAL: 3.61
ECHO MV E/E' RATIO (AVERAGED): 4.76
ECHO MV LVOT VTI INDEX: 1.28
ECHO MV MAX VELOCITY: 1.2 M/S
ECHO MV MEAN GRADIENT: 2 MMHG
ECHO MV MEAN VELOCITY: 0.7 M/S
ECHO MV PEAK GRADIENT: 5 MMHG
ECHO MV VTI: 30.9 CM
ECHO PV MAX VELOCITY: 1 M/S
ECHO PV PEAK GRADIENT: 4 MMHG
ECHO RA AREA 4C: 12.2 CM2
ECHO RA END SYSTOLIC VOLUME APICAL 4 CHAMBER INDEX BSA: 13 ML/M2
ECHO RA VOLUME: 22 ML
ECHO RIGHT VENTRICULAR SYSTOLIC PRESSURE (RVSP): 30 MMHG
ECHO RV BASAL DIMENSION: 3.2 CM
ECHO RV FREE WALL PEAK S': 19 CM/S
ECHO RV TAPSE: 3.1 CM (ref 1.7–?)
ECHO TV PEAK GRADIENT: 2 MMHG
ECHO TV REGURGITANT MAX VELOCITY: 2.51 M/S
ECHO TV REGURGITANT PEAK GRADIENT: 25 MMHG
EKG ATRIAL RATE: 82 BPM
EKG P AXIS: 64 DEGREES
EKG P-R INTERVAL: 120 MS
EKG Q-T INTERVAL: 400 MS
EKG QRS DURATION: 90 MS
EKG QTC CALCULATION (BAZETT): 467 MS
EKG R AXIS: -19 DEGREES
EKG T AXIS: 74 DEGREES
EKG VENTRICULAR RATE: 82 BPM
EOSINOPHIL # BLD: 0 K/UL (ref 0–0.4)
EOSINOPHILS RELATIVE PERCENT: 0 % (ref 1–4)
ERYTHROCYTE [DISTWIDTH] IN BLOOD BY AUTOMATED COUNT: 20.5 % (ref 12.5–15.4)
FENTANYL UR QL: NEGATIVE
FOLATE SERPL-MCNC: 16.8 NG/ML
GFR SERPL CREATININE-BSD FRML MDRD: >60 ML/MIN/1.73M2
GLUCOSE SERPL-MCNC: 94 MG/DL (ref 70–99)
HCT VFR BLD AUTO: 35.9 % (ref 41–53)
HGB BLD-MCNC: 12.1 G/DL (ref 13.5–17.5)
LYMPHOCYTES NFR BLD: 0.88 K/UL (ref 1–4.8)
LYMPHOCYTES RELATIVE PERCENT: 17 % (ref 24–44)
MCH RBC QN AUTO: 26 PG (ref 26–34)
MCHC RBC AUTO-ENTMCNC: 33.6 G/DL (ref 31–37)
MCV RBC AUTO: 77.4 FL (ref 80–100)
METHADONE UR QL: NEGATIVE
MONOCYTES NFR BLD: 0.05 K/UL (ref 0.1–0.8)
MONOCYTES NFR BLD: 1 % (ref 1–7)
MORPHOLOGY: ABNORMAL
NEUTROPHILS NFR BLD: 82 % (ref 36–66)
NEUTS SEG NFR BLD: 4.27 K/UL (ref 1.8–7.7)
OPIATES UR QL SCN: POSITIVE
OXYCODONE UR QL SCN: NEGATIVE
PCP UR QL SCN: NEGATIVE
PLATELET # BLD AUTO: 478 K/UL (ref 140–450)
PMV BLD AUTO: 6.8 FL (ref 6–12)
POTASSIUM SERPL-SCNC: 4 MMOL/L (ref 3.7–5.3)
RBC # BLD AUTO: 4.64 M/UL (ref 4.5–5.9)
SODIUM SERPL-SCNC: 134 MMOL/L (ref 135–144)
TEST INFORMATION: ABNORMAL
VIT B12 SERPL-MCNC: 992 PG/ML (ref 232–1245)
WBC OTHER # BLD: 5.2 K/UL (ref 3.5–11)

## 2024-01-09 PROCEDURE — G0378 HOSPITAL OBSERVATION PER HR: HCPCS

## 2024-01-09 PROCEDURE — 85025 COMPLETE CBC W/AUTO DIFF WBC: CPT

## 2024-01-09 PROCEDURE — 97530 THERAPEUTIC ACTIVITIES: CPT

## 2024-01-09 PROCEDURE — 84425 ASSAY OF VITAMIN B-1: CPT

## 2024-01-09 PROCEDURE — 70553 MRI BRAIN STEM W/O & W/DYE: CPT

## 2024-01-09 PROCEDURE — 2580000003 HC RX 258: Performed by: STUDENT IN AN ORGANIZED HEALTH CARE EDUCATION/TRAINING PROGRAM

## 2024-01-09 PROCEDURE — 97166 OT EVAL MOD COMPLEX 45 MIN: CPT

## 2024-01-09 PROCEDURE — 36415 COLL VENOUS BLD VENIPUNCTURE: CPT

## 2024-01-09 PROCEDURE — 82746 ASSAY OF FOLIC ACID SERUM: CPT

## 2024-01-09 PROCEDURE — 93306 TTE W/DOPPLER COMPLETE: CPT

## 2024-01-09 PROCEDURE — 99222 1ST HOSP IP/OBS MODERATE 55: CPT | Performed by: PSYCHIATRY & NEUROLOGY

## 2024-01-09 PROCEDURE — 82607 VITAMIN B-12: CPT

## 2024-01-09 PROCEDURE — 6370000000 HC RX 637 (ALT 250 FOR IP): Performed by: STUDENT IN AN ORGANIZED HEALTH CARE EDUCATION/TRAINING PROGRAM

## 2024-01-09 PROCEDURE — 93306 TTE W/DOPPLER COMPLETE: CPT | Performed by: INTERNAL MEDICINE

## 2024-01-09 PROCEDURE — 1200000000 HC SEMI PRIVATE

## 2024-01-09 PROCEDURE — 6360000002 HC RX W HCPCS: Performed by: STUDENT IN AN ORGANIZED HEALTH CARE EDUCATION/TRAINING PROGRAM

## 2024-01-09 PROCEDURE — 80307 DRUG TEST PRSMV CHEM ANLYZR: CPT

## 2024-01-09 PROCEDURE — 97535 SELF CARE MNGMENT TRAINING: CPT

## 2024-01-09 PROCEDURE — 96375 TX/PRO/DX INJ NEW DRUG ADDON: CPT

## 2024-01-09 PROCEDURE — 6370000000 HC RX 637 (ALT 250 FOR IP): Performed by: NURSE PRACTITIONER

## 2024-01-09 PROCEDURE — 96372 THER/PROPH/DIAG INJ SC/IM: CPT

## 2024-01-09 PROCEDURE — 80048 BASIC METABOLIC PNL TOTAL CA: CPT

## 2024-01-09 PROCEDURE — 99231 SBSQ HOSP IP/OBS SF/LOW 25: CPT | Performed by: STUDENT IN AN ORGANIZED HEALTH CARE EDUCATION/TRAINING PROGRAM

## 2024-01-09 PROCEDURE — 97162 PT EVAL MOD COMPLEX 30 MIN: CPT

## 2024-01-09 PROCEDURE — 82306 VITAMIN D 25 HYDROXY: CPT

## 2024-01-09 PROCEDURE — 84550 ASSAY OF BLOOD/URIC ACID: CPT

## 2024-01-09 RX ORDER — LORAZEPAM 2 MG/ML
0.5 INJECTION INTRAMUSCULAR ONCE
Status: COMPLETED | OUTPATIENT
Start: 2024-01-09 | End: 2024-01-09

## 2024-01-09 RX ORDER — SODIUM CHLORIDE 0.9 % (FLUSH) 0.9 %
10 SYRINGE (ML) INJECTION ONCE
Status: DISCONTINUED | OUTPATIENT
Start: 2024-01-09 | End: 2024-01-12 | Stop reason: HOSPADM

## 2024-01-09 RX ORDER — MORPHINE SULFATE 2 MG/ML
2 INJECTION, SOLUTION INTRAMUSCULAR; INTRAVENOUS ONCE
Status: COMPLETED | OUTPATIENT
Start: 2024-01-09 | End: 2024-01-09

## 2024-01-09 RX ADMIN — HYDROCODONE BITARTRATE AND ACETAMINOPHEN 2 TABLET: 5; 325 TABLET ORAL at 08:10

## 2024-01-09 RX ADMIN — ENOXAPARIN SODIUM 40 MG: 100 INJECTION SUBCUTANEOUS at 07:58

## 2024-01-09 RX ADMIN — LORAZEPAM 0.5 MG: 2 INJECTION INTRAMUSCULAR; INTRAVENOUS at 10:45

## 2024-01-09 RX ADMIN — SODIUM CHLORIDE, PRESERVATIVE FREE 10 ML: 5 INJECTION INTRAVENOUS at 07:59

## 2024-01-09 RX ADMIN — ATORVASTATIN CALCIUM 10 MG: 10 TABLET, FILM COATED ORAL at 07:58

## 2024-01-09 RX ADMIN — SODIUM CHLORIDE, PRESERVATIVE FREE 10 ML: 5 INJECTION INTRAVENOUS at 22:09

## 2024-01-09 RX ADMIN — MORPHINE SULFATE 2 MG: 2 INJECTION, SOLUTION INTRAMUSCULAR; INTRAVENOUS at 11:45

## 2024-01-09 ASSESSMENT — PAIN DESCRIPTION - DESCRIPTORS
DESCRIPTORS: ACHING
DESCRIPTORS: ACHING;CRAMPING

## 2024-01-09 ASSESSMENT — PAIN DESCRIPTION - ORIENTATION
ORIENTATION: RIGHT
ORIENTATION: RIGHT;LEFT

## 2024-01-09 ASSESSMENT — PAIN DESCRIPTION - LOCATION
LOCATION: SHOULDER
LOCATION: LEG

## 2024-01-09 ASSESSMENT — PAIN SCALES - GENERAL
PAINLEVEL_OUTOF10: 10
PAINLEVEL_OUTOF10: 8

## 2024-01-09 NOTE — PLAN OF CARE
Problem: Discharge Planning  Goal: Discharge to home or other facility with appropriate resources  1/9/2024 1648 by Faustina Pineda RN  Outcome: Progressing  1/9/2024 0411 by Rikki Oliver RN  Outcome: Progressing     Problem: Pain  Goal: Verbalizes/displays adequate comfort level or baseline comfort level  1/9/2024 1648 by Faustina Pineda RN  Outcome: Progressing  1/9/2024 0411 by Rikki Oliver RN  Outcome: Progressing     Problem: Skin/Tissue Integrity  Goal: Absence of new skin breakdown  Description: 1.  Monitor for areas of redness and/or skin breakdown  2.  Assess vascular access sites hourly  3.  Every 4-6 hours minimum:  Change oxygen saturation probe site  4.  Every 4-6 hours:  If on nasal continuous positive airway pressure, respiratory therapy assess nares and determine need for appliance change or resting period.  1/9/2024 1648 by Faustina Pineda RN  Outcome: Progressing  1/9/2024 0411 by Rikki Oliver RN  Outcome: Progressing     Problem: ABCDS Injury Assessment  Goal: Absence of physical injury  1/9/2024 1648 by Faustina Pineda RN  Outcome: Progressing  1/9/2024 0411 by Rikki Oliver RN  Outcome: Progressing     Problem: Safety - Adult  Goal: Free from fall injury  1/9/2024 1648 by Faustina Pineda RN  Outcome: Progressing  1/9/2024 0411 by Rikki Oliver RN  Outcome: Progressing

## 2024-01-09 NOTE — PROGRESS NOTES
Occupational Therapy  Facility/Department: [address]  Occupational Therapy Initial Assessment    Name: Moises Triplett  : 1955  MRN: [id number]  Date of Service: 2024    Discharge Recommendations:  Patient would benefit from continued therapy after discharge  OT Equipment Recommendations  Other: continue to assess     Chief Complaint   Patient presents with    Fall     Fall in shower, has had increase in falls over the past month only pain is R knee      Patient Diagnosis(es): The primary encounter diagnosis was Displaced fracture of acromial process, right shoulder, initial encounter for closed fracture. Diagnoses of Multiple falls, Generalized weakness, Rash and other nonspecific skin eruption, and Elevated troponin were also pertinent to this visit.  Past Medical History:  has no past medical history on file.  Past Surgical History:  has no past surgical history on file.           Assessment   Performance deficits / Impairments: Decreased functional mobility ;Decreased ADL status;Decreased balance;Decreased safe awareness  Assessment: Patient demonstrated decreased ADLs, balance, safety and functional mobility following hospitalization due to fall, R shoulder fx. Patient would benefit from skilled OT services while here at hospital and following discharge to maximize safety and independence. Patient is currently unsafe to return to prior living arrangements due to inability to stand/ambulate/complete ADLs.  Prognosis: Good;Fair  Decision Making: Medium Complexity  REQUIRES OT FOLLOW-UP: Yes  Activity Tolerance  Activity Tolerance: Patient limited by pain  Activity Tolerance Comments: Patient sat at edge bed x8 min with SBA balance, attemtped sit<>stand several trials however, unsuccessful due to extending at trunk and LEs/kicking feet off ground. Returned to supine at end of session.        Plan   Occupational Therapy Plan  Times Per Week: 5-6x/wk  Current Treatment Recommendations: Balance training,  Moderate assistance  UE Bathing Skilled Clinical Factors: clinical judgement based on strength, balance and bed mobility  LE Bathing: Maximum assistance  LE Bathing Skilled Clinical Factors: clinical judgement based on strength, balance and bed mobility  UE Dressing: Moderate assistance  UE Dressing Skilled Clinical Factors: to adjust UE sling multiple times as patient keeps moving arm  LE Dressing: Dependent/Total  LE Dressing Skilled Clinical Factors: slipper socks  Toileting: Dependent/Total  Toileting Skilled Clinical Factors: clinical judgement based on strength, balance and bed mobility  Functional Mobility: Moderate assistance;Maximum assistance  Functional Mobility Skilled Clinical Factors: bed mob with MOD-MAX, attempted MAX for standing, patient unable to lift buttocks from edge of bed due to entending entire body/LEs  Skin Care: N/A       Bed mobility  Rolling to Right: Moderate assistance  Supine to Sit: Moderate assistance (R bed rail)  Sit to Supine: Maximum assistance (not following directions/technique)  Scooting: Maximal assistance  Bed Mobility Comments: cues to not use R UE to assist with supine to sit and for NWBing    Transfers  Transfer Comments: attempted sit<>stand several trials with MAX however, unsuccessful due to patient extending trunk and B LEs    Hearing  Hearing: Within functional limits    Cognition  Overall Cognitive Status: Exceptions  Following Commands: Inconsistently follows commands;Follows one step commands with increased time;Follows one step commands with repetition  Attention Span: Difficulty dividing attention;Difficulty attending to directions;Attends with cues to redirect  Memory: Decreased recall of precautions  Safety Judgement: Decreased awareness of need for safety  Problem Solving: Decreased awareness of errors;Assistance required to generate solutions;Assistance required to identify errors made;Assistance required to correct errors made;Assistance required to  Co-treatment   Time In 1322         Time Out 1355         Minutes 33         Timed Code Treatment Minutes: 23 Minutes       DEMARCUS MCKINNON, SIDR/L

## 2024-01-09 NOTE — CONSULTS
Orthopedic Consult      CHIEF COMPLAINT: Right shoulder fracture    HISTORY OF PRESENT ILLNESS:      The patient is a 68 y.o. male with right acromion fracture.  He had a fall.  States he had frequent falls here recently.  We were consulted for further evaluation for his right acromion fracture    Past Medical History:    History reviewed. No pertinent past medical history.    Past Surgical History:    History reviewed. No pertinent surgical history.    Medications Prior to Admission:   Prior to Admission medications    Medication Sig Start Date End Date Taking? Authorizing Provider   ibuprofen (ADVIL;MOTRIN) 600 MG tablet  1/4/24  Yes ProviderTom MD   atorvastatin (LIPITOR) 10 MG tablet Take 1 tablet by mouth daily 12/30/23  Yes ProviderTom MD       Allergies:    Sulfa antibiotics      Social History:   Social History     Socioeconomic History    Marital status: Single     Spouse name: None    Number of children: None    Years of education: None    Highest education level: None   Tobacco Use    Smoking status: Former     Types: Cigarettes    Smokeless tobacco: Never   Substance and Sexual Activity    Alcohol use: Yes     Comment: beer a day    Drug use: Yes     Frequency: 7.0 times per week     Types: Marijuana (Weed)     Social Determinants of Health     Food Insecurity: No Food Insecurity (1/8/2024)    Hunger Vital Sign     Worried About Running Out of Food in the Last Year: Never true     Ran Out of Food in the Last Year: Never true   Transportation Needs: No Transportation Needs (1/8/2024)    PRAPARE - Transportation     Lack of Transportation (Medical): No     Lack of Transportation (Non-Medical): No   Housing Stability: Low Risk  (1/8/2024)    Housing Stability Vital Sign     Unable to Pay for Housing in the Last Year: No     Number of Places Lived in the Last Year: 1     Unstable Housing in the Last Year: No         Family History:  History reviewed. No pertinent family

## 2024-01-09 NOTE — CONSULTS
[facility] Neurology   IN-PATIENT SERVICE      NEUROLOGY CONSULT  NOTE            Date:   1/9/2024  Patient name:  Moises Triplett  Date of admission:  1/8/2024  YOB: 1955      Chief Complaint:     Chief Complaint   Patient presents with    Fall     Fall in shower, has had increase in falls over the past month only pain is R knee       Reason for Consult:      falls    History of Present Illness:     The patient is a 68 y.o. male who presents with Fall (Fall in shower, has had increase in falls over the past month only pain is R knee)  . The patient was seen and examined and the chart was reviewed.     This is a 68 year old male who presented secondary to falls. This resulted in a R acromion fracture and patient is currently in a RUE sling. He states since fracturing his ankle (on the L) back in 2022 he has been debilitated and having issues with balance. He states over the last 10 days he has been having \"more serious falls\" and has been having more issues. HCT showed NAP. He states he normally lives in his own and cooks for himself but has been having more and more difficulty caring for himself as of lately. He would like rehab.     Past Medical History:     History reviewed. No pertinent past medical history.     Past Surgical History:     History reviewed. No pertinent surgical history.     Medications Prior to Admission:     Prior to Admission medications    Medication Sig Start Date End Date Taking? Authorizing Provider   ibuprofen (ADVIL;MOTRIN) 600 MG tablet  1/4/24  Yes ProviderTom MD   atorvastatin (LIPITOR) 10 MG tablet Take 1 tablet by mouth daily 12/30/23  Yes ProviderTom MD        Allergies:     Sulfa antibiotics    Social History:     Tobacco:    reports that he has quit smoking. His smoking use included cigarettes. He has never used smokeless tobacco.  Alcohol:      reports current alcohol use.  Drug Use:  reports current drug use. Frequency: 7.00 times per week. Drug:  Marijuana (Weed).    Family History:     History reviewed. No pertinent family history.    Review of Systems:       Constitutional Negative for fever and chills   HEENT Negative for ear discharge, ear pain, nosebleed. Negative for photophobia, headache.    Musculoskeletal Negative for joint pain, negative for myalgia   Respiratory Negative for cough, dyspnea. Negative for hemoptysis and sputum.    Cardiovascular Negative for palpitations, chest pain. Negative for orthopnea, claudication.    Gastrointestinal Negative for nausea, vomiting. Negative for abdominal pain, diarrhea, blood in stool   Genitourinary  Negative for dysuria, hematuria. Negative for suprapubic pain. Negative for bladder incontinence.    Skin Negative for rash or itching   Hematology Negative for ecchymosis, anemia   Psychiatric Negative for anxiety, depression. Negative for suicidal ideation, hallucinations         Physical Exam:   /76   Pulse 77   Temp 97.9 °F (36.6 °C) (Oral)   Resp 18   Ht 1.727 m (5' 8\")   Wt 63.5 kg (140 lb)   SpO2 99%   BMI 21.29 kg/m²   Temp (24hrs), Av.9 °F (36.6 °C), Min:97.7 °F (36.5 °C), Max:98.2 °F (36.8 °C)        General examination:      General Appearance:  alert, well appearing, and in no acute distress  HEENT: Normocephalic, atraumatic, moist mucus membranes  Neck: supple, no carotid bruits, (-) nuchal rigidity  Lungs:  Respirations unlabored, chest wall no deformity, BS normal  Cardiovascular: normal rate, regular rhythm  Abdomen: Soft, nontender, nondistended, normal bowel sounds  Skin: +bruising   Extremities:  + RUE sling, bandage over L ankle         Neurological examination:    Mental status   Alert and oriented x 3; following all commands; speech is fluent, no dysarthria, aphasia.      Cranial nerves   II - visual fields intact to confrontation; pupils reactive  III, IV, VI - extraocular muscles intact; no RODRÍGUEZ; no nystagmus; no ptosis   V - normal facial sensation                           consultation.      Electronically signed by Laura Gonzalez DO on 1/9/2024 at 11:19 AM      Laura Gonzalez DO  [facility]  Neurology

## 2024-01-09 NOTE — CARE COORDINATION
[facility]  [address] [place] [address]    Patient SSN:           Information contained in this transmission is for the sole use of the intended recipient(s) and may contain confidential and privileged information. Any  unauthorized review, use, disclosure or distribution is prohibited. If you are not the intended recipient, please contact the sender for further  instructions regarding the information.          /: Pastora Hartley  Phone Number: [phone]

## 2024-01-09 NOTE — DISCHARGE INSTR - COC
Continuity of Care Form    Patient Name: Moises Triplett   :  1955  MRN:  [id number]    Admit date:  2024  Discharge date:  ***    Code Status Order: Full Code   Advance Directives:     Admitting Physician:  Juan Luis Faustin MD  PCP: No primary care provider on file.    Discharging Nurse: ***  Discharging Hospital Unit/Room#: 303/303-01  Discharging Unit Phone Number: ***    Emergency Contact:   Extended Emergency Contact Information  Primary Emergency Contact: Alicia Gonzalez  Home Phone: [phone]  Relation: Other    Past Surgical History:  History reviewed. No pertinent surgical history.    Immunization History:   Immunization History   Administered Date(s) Administered    COVID-19, J&J, (age 18y+), IM, 0.5 mL 2021    TDaP, ADACEL (age 10y-64y), BOOSTRIX (age 10y+), IM, 0.5mL 2021       Active Problems:  Patient Active Problem List   Diagnosis Code    Physical debility R53.81    Alcohol abuse, uncomplicated F10.10    Essential (primary) hypertension I10    Hyperlipidemia, unspecified E78.5       Isolation/Infection:   Isolation            No Isolation          Patient Infection Status       None to display            Nurse Assessment:  Last Vital Signs: /72   Pulse 86   Temp 97.9 °F (36.6 °C) (Oral)   Resp 18   Ht 1.727 m (5' 8\")   Wt 63.5 kg (140 lb)   SpO2 96%   BMI 21.29 kg/m²     Last documented pain score (0-10 scale): Pain Level: 8  Last Weight:   Wt Readings from Last 1 Encounters:   24 63.5 kg (140 lb)     Mental Status:  {IP PT MENTAL STATUS:}    IV Access:  { KAYLA IV ACCESS:575744429}    Nursing Mobility/ADLs:  Walking   {CHP DME ADLs:962385889}  Transfer  {CHP DME ADLs:590272487}  Bathing  {CHP DME ADLs:770657322}  Dressing  {CHP DME ADLs:843291109}  Toileting  {CHP DME ADLs:695640999}  Feeding  {P DME ADLs:538461833}  Med Admin  {P DME ADLs:792337433}  Med Delivery   { KAYLA MED Delivery:586456261}    Wound Care Documentation and Therapy:         Elimination:  Continence:   Bowel: {YES / NO:}  Bladder: {YES / NO:}  Urinary Catheter: {Urinary Catheter:272721369}   Colostomy/Ileostomy/Ileal Conduit: {YES / NO:}       Date of Last BM: ***  No intake or output data in the 24 hours ending 24 0553  No intake/output data recorded.    Safety Concerns:     { KAYLA Safety Concerns:619370320}    Impairments/Disabilities:      { KAYLA Impairments/Disabilities:139309853}    Nutrition Therapy:  Current Nutrition Therapy:   { KAYLA Diet List:070763869}    Routes of Feeding: {[facility] DME Other Feedings:395118712}  Liquids: {Slp liquid thickness:51313}  Daily Fluid Restriction: {[facility] DME Yes amt example:903694367}  Last Modified Barium Swallow with Video (Video Swallowing Test): {Done Not Done Date:599937020}    Treatments at the Time of Hospital Discharge:   Respiratory Treatments: ***  Oxygen Therapy:  {Therapy; copd oxygen:81708}  Ventilator:    {[facility] Vent List:487260388}    Rehab Therapies: {THERAPEUTIC INTERVENTION:6108576375}  Weight Bearing Status/Restrictions: {[facility] Weight Bearin}  Other Medical Equipment (for information only, NOT a DME order):  {EQUIPMENT:973078978}  Other Treatments: ***    Patient's personal belongings (please select all that are sent with patient):  {[facility] DME Belongings:550087289}    RN SIGNATURE:  {Esignature:751739926}    CASE MANAGEMENT/SOCIAL WORK SECTION    Inpatient Status Date: ***    Readmission Risk Assessment Score:  Readmission Risk              Risk of Unplanned Readmission:  0           Discharging to Facility/ Agency   Name:   Address:  Phone:  Fax:    Dialysis Facility (if applicable)   Name:  Address:  Dialysis Schedule:  Phone:  Fax:    / signature: {Esignature:384430877}    PHYSICIAN SECTION    Prognosis: Good    Condition at Discharge: Stable    Rehab Potential (if transferring to Rehab): Good    Recommended Labs or Other Treatments After Discharge: PT/OT    Physician

## 2024-01-09 NOTE — CARE COORDINATION
Writer spoke with patient at bedside he is agreeable to ARU, referrals sent to [facility] and [facility].      1630: Spoke with Eunice @ [facility] they can accept patient.      1/10/2024:    0839:  VM to Zoie @ [facility], awaiting call back.    1200 Spoke with patient at bedside, updated that [facility] hospital has declined, he is a agreeable to [facility].     1230  VM to Eunice @ [facility], patient has accepted and will be ready for discharge tomorrow.     1/11/2024    1400: MRI not completed , Eunice @ [facility] updated.                1800: Notified [facility] patient will tranports     1/12/2024    1140 [facility] notified that patient is being transferred to [facility]. Upon discharge contact Eunice  [facility] [phone] , no new referral required.

## 2024-01-09 NOTE — CARE COORDINATION
Case Management Assessment  Initial Evaluation    Date/Time of Evaluation: 1/9/2024 10:54 AM  Assessment Completed by: Pastora Hartley RN    If patient is discharged prior to next notation, then this note serves as note for discharge by case management.    Patient Name: Moises Triplett                   YOB: 1955  Diagnosis: Rash and other nonspecific skin eruption [R21]  Generalized weakness [R53.1]  Elevated troponin [R79.89]  Physical debility [R53.81]  Multiple falls [R29.6]  Displaced fracture of acromial process, right shoulder, initial encounter for closed fracture [S42.121A]                   Date / Time: 1/8/2024  1:31 PM    Patient Admission Status: Observation   Readmission Risk (Low < 19, Mod (19-27), High > 27): No data recorded  Current PCP: No primary care provider on file.  PCP verified by CM? Yes    Chart Reviewed: Yes      History Provided by: Patient  Patient Orientation: Alert and Oriented    Patient Cognition: Alert    Hospitalization in the last 30 days (Readmission):  No    If yes, Readmission Assessment in  Navigator will be completed.    Advance Directives:      Code Status: Full Code   Patient's Primary Decision Maker is:        Discharge Planning:    Patient lives with: Alone Type of Home: Apartment  Primary Care Giver: Self  Patient Support Systems include: Family Members, Friends/Neighbors, Home Care Staff   Current Financial resources: Medicare, Medicaid  Current community resources: ECF/Home Care  Current services prior to admission: Durable Medical Equipment, Home Care, Other (Comment) (Wound Care Clinic)            Current DME: Cane, Other (Comment) (Shower Bench, [employer], Reacher, 2w/4w Walker)            Type of Home Care services:  OT, PT, Nursing Services    ADLS  Prior functional level: Assistance with the following:, Housework, Shopping  Current functional level: Assistance with the following:, Housework, Shopping    PT AM-PAC:   /24  OT AM-PAC:   /24    Family

## 2024-01-09 NOTE — PROGRESS NOTES
[facility]  Office: [phone]  Chapin Muse DO, Cyrus Dunn DO, Franky Jules DO, Danny Sexton DO, Luis Angel Gracia MD, Julee Quiñones MD, Delroy Landeros MD, Madison Collier MD,  Tony Rosales MD, Joni Dubois MD, Soila Oliva MD,  Neftali Larkin DO, Alvaro Maria MD, Juan Luis Faustin MD, Torni Muse DO, Eden Barajas MD,  Marquis Lemus DO, Yarely Molina MD, Maya West MD, Karen Adler MD, Shyam Beckman MD,  Rojas Neri MD, Axel Tello MD, Ryan Batista MD, Tima Hoffmann MD, Michael Grimes MD, Angus Hunt MD, Ramiro Burns DO, Bhupinder Mao DO, Mari Villasenor MD,  Davion Graves MD, Shirley Waterhouse, CNP,  Alycia De La Fuente CNP, Tavon Watkins, CNP,  Katharine Merrill, LACHO, Laura Small, CNP, Brenda Jade CNP, Malena Rossi CNP, Marya Watson CNP, Kortney Wilson, CNP, Zabrina Obrien, PA-C, Soila Barber PA-C, Tia Mohan, CNP, Betina Saldivar, CNS, Carmen Worley, CNP, Mamie Crain CNP, Tracy Schwab, CNP         [facility]   IN-PATIENT SERVICE   [facility]    Progress Note    1/9/2024    7:51 AM    Name:   Moises Triplett  MRN:     [id number]     Acct:      [id number]   Room:   [address] [place] [address] Day:  0  Admit Date:  1/8/2024  1:31 PM    PCP:   No primary care provider on file.  Code Status:  Full Code    Subjective:     Patient was seen and examined at bedside this AM. He reports feeling well and is without complaint. Awaiting rehab placement.     Medications:     Allergies:    Allergies   Allergen Reactions    Sulfa Antibiotics Rash       Current Meds:   Scheduled Meds:    atorvastatin  10 mg Oral Daily    sodium chloride flush  5-40 mL IntraVENous 2 times per day    enoxaparin  40 mg SubCUTAneous Daily     Continuous Infusions:    sodium chloride       PRN Meds: sodium chloride flush, sodium chloride, potassium chloride **OR** potassium alternative oral replacement **OR** potassium chloride, magnesium sulfate, ondansetron **OR**  acute fracture or dislocation. Left knee: 1. Mild degenerative change of the medial compartment. 2. No acute fracture or dislocation.     XR KNEE LEFT (3 VIEWS)    Result Date: 1/8/2024  Right shoulder: 1. Suspected nondisplaced fracture at the junction of the acromion and scapula. 2. Severe right glenohumeral osteoarthrosis. 3. Moderate to severe degenerative changes of the right AC joint. Right knee: 1. Mild degenerative changes of the medial compartment. 2. No acute fracture or dislocation. Left knee: 1. Mild degenerative change of the medial compartment. 2. No acute fracture or dislocation.     XR KNEE RIGHT (3 VIEWS)    Result Date: 1/8/2024  Right shoulder: 1. Suspected nondisplaced fracture at the junction of the acromion and scapula. 2. Severe right glenohumeral osteoarthrosis. 3. Moderate to severe degenerative changes of the right AC joint. Right knee: 1. Mild degenerative changes of the medial compartment. 2. No acute fracture or dislocation. Left knee: 1. Mild degenerative change of the medial compartment. 2. No acute fracture or dislocation.     XR CHEST PORTABLE    Result Date: 1/8/2024  No acute cardiopulmonary process.     CT CERVICAL SPINE WO CONTRAST    Result Date: 1/8/2024  1. No definite acute osseous abnormality of the cervical spine. 2. Extensive multilevel DJD/DDD changes, slightly increased as compared to 2021 with associated findings, as above.     CT Head W/O Contrast    Result Date: 1/8/2024  No acute intracranial abnormality.       Physical Examination:     General appearance:  alert, cooperative and no distress  Mental Status:  oriented to person, place and time and normal affect  Lungs:  clear to auscultation bilaterally, normal effort  Heart:  regular rate and rhythm, no murmur  Abdomen:  soft, nontender, nondistended, normal bowel sounds, no masses, hepatomegaly, splenomegaly  Extremities:  Right arm in sling.   Skin:  no gross lesions, rashes, induration    Assessment:     Hospital

## 2024-01-09 NOTE — PROGRESS NOTES
Physical Therapy  Facility/Department: [address]  Physical Therapy Initial Assessment    Name: Moises Triplett  : 1955  MRN: [id number]  Date of Service: 2024  Chief Complaint   Patient presents with    Fall     Fall in shower, has had increase in falls over the past month only pain is R knee         Discharge Recommendations:  Patient would benefit from continued therapy after discharge   PT Equipment Recommendations  Equipment Needed: Yes  Other: to be determined      Patient Diagnosis(es): The primary encounter diagnosis was Displaced fracture of acromial process, right shoulder, initial encounter for closed fracture. Diagnoses of Multiple falls, Generalized weakness, Rash and other nonspecific skin eruption, and Elevated troponin were also pertinent to this visit.  Past Medical History:  has no past medical history on file.  Past Surgical History:  has no past surgical history on file.    Assessment   Body Structures, Functions, Activity Limitations Requiring Skilled Therapeutic Intervention: Decreased functional mobility ;Decreased safe awareness;Decreased sensation;Decreased balance;Increased pain  Assessment: Pt normally lives alone in 2nd floor apartment and received home nursing for L lower leg wound with history of L ankle hardware removal 2022. He walks with cane & independent in ADLs. However, pt reports history multiple falls. He currently requires mod A & L bedrail to get out of bed with R UE sling and cues to prevent R UE use s/p nondisplaced R acromion fx. He required mod to max A to return to bed after use of Marlys Stedy. Pt attempt stand with hemiwalker but unable so stood with L bedrail and R knee blocked briefly with max A but anxious with L foot slide. Trial Marlys Stedy but maxA/dependent as sits early and tends to attempt to use R UE despite in sling & cues not to. Pt physically capable of performing mobility better but anxiousness limiting currently. Pt not safe to return to prior  resistance           Bed mobility  Rolling to Left: Moderate assistance  Supine to Sit: Moderate assistance (L bedrail)  Sit to Supine: Maximum assistance (L bedrail)  Scooting: Maximal assistance (L bedrail)  Bed Mobility Comments: first time sitting for trial transfers with hemiwalker, then return to supine for PT to get Marlys Lindody; second time return to bed with max A/Dependent as pt no follow safety cues and extending trunk and legs seated bedside  Transfers  Sit to Stand: Maximum Assistance (L bedrail)  Stand to Sit: Maximum Assistance (L bedrail)  Bed to Chair: Unable to assess (recommend maxi move)  Lateral Transfers: Maximum Assistance (L bedrail; low pivot to HOB)  Comment: pt anxious trial sit to stand with hemiwalker such that defer to stand with L bedrail only with bed elevated; brief stand only as pt begin to extend trunk and slide L foot out when R foot only blocked; Marlys Stedy brought to room; bed elevated and max A/cues to stand with first seat placed and pt begin to sit back down despite cues with 2nd paddle placed with much effort; Marlys Stedy to head of bed and max education to pt but continued anxious/distracted and multiple pt complaint; max cues not to use R hand on Marlys Stedy; pt required return to supine with gait belt on as letting himself extend trunk/legs when seated bedside; notified RN that pt needed dressing on R knee scab and recommend maxi move with nursing  Ambulation  WB Status: NWB R UE  Ambulation  Other Apparatus:  (R UE sling)  Assistance: Unable to assess  Comments: not safe or tolerable at this time     Balance  Posture: Fair  Sitting - Static: Fair;- (with L bedrail)  Sitting - Dynamic: Poor  Standing - Static: Poor;-  Comments: although pt exhibits WFL trunk strength, pt will extend trunk/LEs seated bedside with attempting mobility and currently needs safe pt handling equipment for transfers                                                           AM-PAC - Mobility    AM-PAC

## 2024-01-10 PROBLEM — R53.1 GENERALIZED WEAKNESS: Status: ACTIVE | Noted: 2024-01-08

## 2024-01-10 PROBLEM — R29.6 MULTIPLE FALLS: Status: ACTIVE | Noted: 2024-01-10

## 2024-01-10 PROBLEM — R21 RASH AND OTHER NONSPECIFIC SKIN ERUPTION: Status: ACTIVE | Noted: 2024-01-10

## 2024-01-10 LAB — URATE SERPL-MCNC: 2.8 MG/DL (ref 3.4–7)

## 2024-01-10 PROCEDURE — 1200000000 HC SEMI PRIVATE

## 2024-01-10 PROCEDURE — 6360000004 HC RX CONTRAST MEDICATION: Performed by: PSYCHIATRY & NEUROLOGY

## 2024-01-10 PROCEDURE — 97116 GAIT TRAINING THERAPY: CPT

## 2024-01-10 PROCEDURE — 97530 THERAPEUTIC ACTIVITIES: CPT

## 2024-01-10 PROCEDURE — 6360000002 HC RX W HCPCS: Performed by: STUDENT IN AN ORGANIZED HEALTH CARE EDUCATION/TRAINING PROGRAM

## 2024-01-10 PROCEDURE — 2580000003 HC RX 258: Performed by: STUDENT IN AN ORGANIZED HEALTH CARE EDUCATION/TRAINING PROGRAM

## 2024-01-10 PROCEDURE — 99232 SBSQ HOSP IP/OBS MODERATE 35: CPT | Performed by: PSYCHIATRY & NEUROLOGY

## 2024-01-10 PROCEDURE — 97110 THERAPEUTIC EXERCISES: CPT

## 2024-01-10 PROCEDURE — 99232 SBSQ HOSP IP/OBS MODERATE 35: CPT | Performed by: STUDENT IN AN ORGANIZED HEALTH CARE EDUCATION/TRAINING PROGRAM

## 2024-01-10 PROCEDURE — 97535 SELF CARE MNGMENT TRAINING: CPT

## 2024-01-10 PROCEDURE — A9579 GAD-BASE MR CONTRAST NOS,1ML: HCPCS | Performed by: PSYCHIATRY & NEUROLOGY

## 2024-01-10 PROCEDURE — 6370000000 HC RX 637 (ALT 250 FOR IP): Performed by: STUDENT IN AN ORGANIZED HEALTH CARE EDUCATION/TRAINING PROGRAM

## 2024-01-10 PROCEDURE — 96372 THER/PROPH/DIAG INJ SC/IM: CPT

## 2024-01-10 PROCEDURE — 6370000000 HC RX 637 (ALT 250 FOR IP): Performed by: NURSE PRACTITIONER

## 2024-01-10 RX ORDER — CARVEDILOL 3.12 MG/1
3.12 TABLET ORAL 2 TIMES DAILY WITH MEALS
Status: DISCONTINUED | OUTPATIENT
Start: 2024-01-10 | End: 2024-01-12 | Stop reason: HOSPADM

## 2024-01-10 RX ORDER — NICOTINE 21 MG/24HR
1 PATCH, TRANSDERMAL 24 HOURS TRANSDERMAL DAILY
Status: DISCONTINUED | OUTPATIENT
Start: 2024-01-10 | End: 2024-01-12 | Stop reason: HOSPADM

## 2024-01-10 RX ADMIN — ATORVASTATIN CALCIUM 10 MG: 10 TABLET, FILM COATED ORAL at 09:03

## 2024-01-10 RX ADMIN — HYDROCODONE BITARTRATE AND ACETAMINOPHEN 2 TABLET: 5; 325 TABLET ORAL at 09:17

## 2024-01-10 RX ADMIN — GADOTERIDOL 13 ML: 279.3 INJECTION, SOLUTION INTRAVENOUS at 13:00

## 2024-01-10 RX ADMIN — HYDROCODONE BITARTRATE AND ACETAMINOPHEN 2 TABLET: 5; 325 TABLET ORAL at 01:46

## 2024-01-10 RX ADMIN — HYDROCODONE BITARTRATE AND ACETAMINOPHEN 1 TABLET: 5; 325 TABLET ORAL at 23:03

## 2024-01-10 RX ADMIN — CARVEDILOL 3.12 MG: 3.12 TABLET, FILM COATED ORAL at 14:48

## 2024-01-10 RX ADMIN — SODIUM CHLORIDE, PRESERVATIVE FREE 10 ML: 5 INJECTION INTRAVENOUS at 09:04

## 2024-01-10 RX ADMIN — POLYETHYLENE GLYCOL 3350 17 G: 17 POWDER, FOR SOLUTION ORAL at 18:02

## 2024-01-10 RX ADMIN — ENOXAPARIN SODIUM 40 MG: 100 INJECTION SUBCUTANEOUS at 09:03

## 2024-01-10 RX ADMIN — SODIUM CHLORIDE, PRESERVATIVE FREE 10 ML: 5 INJECTION INTRAVENOUS at 19:40

## 2024-01-10 ASSESSMENT — PAIN DESCRIPTION - DESCRIPTORS: DESCRIPTORS: DISCOMFORT;ACHING

## 2024-01-10 ASSESSMENT — PAIN SCALES - GENERAL
PAINLEVEL_OUTOF10: 2
PAINLEVEL_OUTOF10: 6
PAINLEVEL_OUTOF10: 7
PAINLEVEL_OUTOF10: 9

## 2024-01-10 ASSESSMENT — PAIN DESCRIPTION - ORIENTATION: ORIENTATION: RIGHT

## 2024-01-10 ASSESSMENT — PAIN DESCRIPTION - LOCATION
LOCATION: HAND;SHOULDER
LOCATION: BACK;HEAD

## 2024-01-10 NOTE — PROGRESS NOTES
[facility]  Office: [phone]  Chapin Muse DO, Cyrus Dunn, DO, Franky Jules DO, Danny Sexton DO, Luis Angel Gracia MD, Julee Quiñones MD, Delroy Landeros MD, Madison Collier MD,  Tony Rosales MD, Joni Dubois MD, Soila Oliva MD,  Neftali Larkin DO, Alvaro Maria MD, Juan Luis Faustin MD, Torin Muse DO, Eden Barajas MD,  Marquis Lemus DO, Yarely Molina MD, Maya West MD, Karen Adler MD, Shyam Beckman MD,  Rojas Neri MD, Axel Tello MD, Ryan Batista MD, Tima Hoffmann MD, Michael Grimes MD, Angus Hunt MD, Ramiro Burns DO, Bhupinder Mao DO, Mari Villasenor MD,  Davion Graves MD, Shirley Waterhouse, CNP,  Alycia De La Fuente, CNP, Tavon Watkins, CNP,  Katharine Merrill, LACHO, Laura Small, CNP, Brenda Jade, CNP, Malena Rossi CNP, Marya Watson, CNP, Kortney Wilson, CNP, Zabrina Obrien, PA-C, Soila Barber PA-C, Tia Mohan, CNP, Betina Saldivar, CNS, Carmen Worley, CNP, Mamie Crain, CNP, Tracy Schwab, CNP         [facility]   IN-PATIENT SERVICE   [facility]    Progress Note    1/10/2024    12:28 PM    Name:   Moises Triplett  MRN:     [id number]     Acct:      [id number]   Room:   [address] [place] [address] Day:  0  Admit Date:  1/8/2024  1:31 PM    PCP:   No primary care provider on file.  Code Status:  Full Code    Subjective:     Patient seen and examined this morning.  Resting comfortably in his recliner.  No acute events overnight.  Patient was hypertensive earlier this morning, /103, not on any antihypertensive as of now.  Labs reviewed, urine tox positive for cannabinoid and opiates.  Echo done yesterday suggestive of EF 55-60%, s/p MRI brain suggestive of chronic microvascular disease.  Neurology following.  Awaiting MRI spine as per neuro, which will be happening at 3 PM today.  Patient accepted at [facility], although he mentioned that he would prefer [facility].    Medications:     Allergies:    Allergies   Allergen Reactions     \"PO2ART\", \"PO2\", \"POCHCO3\", \"FDD3SKK\", \"HCO3\", \"NBEA\", \"PBEA\", \"BEART\", \"BE\", \"THGBART\", \"THB\", \"NSS2ZRU\", \"XDDH5GAT\", \"K1SRLENC\", \"O2SAT\", \"FIO2\"  No results found for: \"SPECIAL\"  No results found for: \"CULTURE\"    Radiology:  XR SHOULDER RIGHT (MIN 2 VIEWS)    Result Date: 1/8/2024  Right shoulder: 1. Suspected nondisplaced fracture at the junction of the acromion and scapula. 2. Severe right glenohumeral osteoarthrosis. 3. Moderate to severe degenerative changes of the right AC joint. Right knee: 1. Mild degenerative changes of the medial compartment. 2. No acute fracture or dislocation. Left knee: 1. Mild degenerative change of the medial compartment. 2. No acute fracture or dislocation.     XR KNEE LEFT (3 VIEWS)    Result Date: 1/8/2024  Right shoulder: 1. Suspected nondisplaced fracture at the junction of the acromion and scapula. 2. Severe right glenohumeral osteoarthrosis. 3. Moderate to severe degenerative changes of the right AC joint. Right knee: 1. Mild degenerative changes of the medial compartment. 2. No acute fracture or dislocation. Left knee: 1. Mild degenerative change of the medial compartment. 2. No acute fracture or dislocation.     XR KNEE RIGHT (3 VIEWS)    Result Date: 1/8/2024  Right shoulder: 1. Suspected nondisplaced fracture at the junction of the acromion and scapula. 2. Severe right glenohumeral osteoarthrosis. 3. Moderate to severe degenerative changes of the right AC joint. Right knee: 1. Mild degenerative changes of the medial compartment. 2. No acute fracture or dislocation. Left knee: 1. Mild degenerative change of the medial compartment. 2. No acute fracture or dislocation.     XR CHEST PORTABLE    Result Date: 1/8/2024  No acute cardiopulmonary process.     CT CERVICAL SPINE WO CONTRAST    Result Date: 1/8/2024  1. No definite acute osseous abnormality of the cervical spine. 2. Extensive multilevel DJD/DDD changes, slightly increased as compared to 2021 with associated findings,  as above.     CT Head W/O Contrast    Result Date: 1/8/2024  No acute intracranial abnormality.       Physical Examination:     General appearance:  alert, cooperative and no distress  Mental Status:  oriented to person, place and time and normal affect  Lungs:  clear to auscultation bilaterally, normal effort  Heart:  regular rate and rhythm, no murmur  Abdomen:  soft, nontender, nondistended, normal bowel sounds, no masses, hepatomegaly, splenomegaly  Extremities:  Right arm in sling.   Skin:  no gross lesions, rashes, induration    Assessment:     Hospital Problems             Last Modified POA    * (Principal) Physical debility 1/8/2024 Yes    Alcohol abuse, uncomplicated 1/8/2024 Yes    Essential (primary) hypertension 1/8/2024 Yes    Hyperlipidemia, unspecified 1/8/2024 Yes    Displaced fracture of acromial process, right shoulder, initial encounter for closed fracture 1/9/2024 Yes     Plan:     Physical debility   -The patient states that he has been falling frequently and does not feel that he can be safely discharged home   -The patient is interested in ARU placement   -The patient states that his falls are secondary to headaches and dizziness   -S/p MRI of the brain suggestive of chronic microvascular disease.  Pending cervical spine .   -Neurology following.     Right nondisplaced acromion fracture   -X-ray of the right shoulder is showing a suspected nondisplaced fracture at the junction of the acromion and scapula   -Patient was evaluated by orthopedic surgery, as per Ortho note fracture should heal without any surgical intervention, continue sling for comfort and okay with him coming out of it.  And outpatient follow-up with orthopedic in 6 weeks.     Elevated blood pressure  Patient does not have documented history of hypertension.  His blood pressure was elevated overnight.  Started him on Coreg 3.125 twice daily.  Continue to monitor vitals.  Will adjust Coreg accordingly.    Elevated troponin : No

## 2024-01-10 NOTE — PROGRESS NOTES
[facility] Neurology Specialist  [address]  [place] [address]  PH:  [phone] or [phone]  FAX:  [phone]            Brief history: Moises Triplett is a 68 y.o. old male admitted on 2024 with  falls.      Subjective: MRI Brain reviewed- motion limited, showed NAP. MRI C-spine pending. Discussed with nursing staff and MRI technician.      Objective: BP (!) 155/103   Pulse 66   Temp 97.5 °F (36.4 °C)   Resp 18   Ht 1.727 m (5' 8\")   Wt 53.1 kg (117 lb 1 oz)   SpO2 100%   BMI 17.80 kg/m²       Medications:    sodium chloride flush  10 mL IntraVENous Once    atorvastatin  10 mg Oral Daily    sodium chloride flush  5-40 mL IntraVENous 2 times per day    enoxaparin  40 mg SubCUTAneous Daily          Physical Exam:   BP (!) 155/103   Pulse 66   Temp 97.5 °F (36.4 °C)   Resp 18   Ht 1.727 m (5' 8\")   Wt 53.1 kg (117 lb 1 oz)   SpO2 100%   BMI 17.80 kg/m²   Temp (24hrs), Av.6 °F (36.4 °C), Min:97.2 °F (36.2 °C), Max:98.1 °F (36.7 °C)        General examination:       General Appearance:  alert, well appearing, and in no acute distress  HEENT: Normocephalic, atraumatic, moist mucus membranes  Neck: supple, no carotid bruits, (-) nuchal rigidity  Lungs:  Respirations unlabored, chest wall no deformity, BS normal  Cardiovascular: normal rate, regular rhythm  Abdomen: Soft, nontender, nondistended, normal bowel sounds  Skin: +bruising   Extremities:  + RUE sling, bandage over L ankle           Neurological examination:     Mental status    Alert and oriented x 3; following all commands; speech is fluent, no dysarthria, aphasia.       Cranial nerves    II - visual fields intact to confrontation; pupils reactive  III, IV, VI - extraocular muscles intact; no RODRÍGUEZ; no nystagmus; no ptosis   V - normal facial sensation                                                               VII - normal facial symmetry                                                             VIII -

## 2024-01-10 NOTE — PROGRESS NOTES
Occupational Therapy  Facility/Department: [address]  Occupational Therapy Daily Treatment Note    Name: Moises Triplett  : 1955  MRN: [id number]  Date of Service: 1/10/2024    Discharge Recommendations:  Patient would benefit from continued therapy after discharge  OT Equipment Recommendations  Other: continue to assess       Patient Diagnosis(es): The primary encounter diagnosis was Displaced fracture of acromial process, right shoulder, initial encounter for closed fracture. Diagnoses of Multiple falls, Generalized weakness, Rash and other nonspecific skin eruption, and Elevated troponin were also pertinent to this visit.  Past Medical History:  has no past medical history on file.  Past Surgical History:  has no past surgical history on file.           Assessment   Performance deficits / Impairments: Decreased functional mobility ;Decreased ADL status;Decreased balance;Decreased safe awareness  Assessment: Pt progressing towards STGs. Patient continues to demonstrate above deficits. Patient would continue to benefit from skilled OT services while here at hospital and following discharge to maximize safety and independence. Patient is currently unsafe to return to prior living arrangements due to inability to stand/ambulate/complete ADLs.  Prognosis: Good;Fair  REQUIRES OT FOLLOW-UP: Yes  Activity Tolerance  Activity Tolerance Comments: sat EOB with CGA-MOD for balance ~6 min, sat up in chair for breakfast and then to complete sponge bathing, stood in Marlys Stedy ~2 min with MIN-MOD for balance with cues for posture        Plan   Occupational Therapy Plan  Times Per Week: 5-6x/wk  Current Treatment Recommendations: Balance training, Functional mobility training, Safety education & training, Patient/Caregiver education & training, Equipment evaluation, education, & procurement, Self-Care / ADL     Restrictions  Restrictions/Precautions  Restrictions/Precautions: Fall Risk, Weight Bearing  Required Braces or  sling  LE Dressing: Dependent/Total  LE Dressing Skilled Clinical Factors: slipper socks  Toileting Skilled Clinical Factors: patient used urinal prior to therapy entering however, had spillage due to difficulty holding urinal with arthrtic deformities in hands  Functional Mobility: Moderate assistance;Maximum assistance  Functional Mobility Skilled Clinical Factors: bed mob with MOD-MAX, sit<>stand MAX x2 with Marlys Joann dep bed to/from chair  Skin Care: Soap and water        Bed mobility  Rolling to Right: Moderate assistance  Supine to Sit: Moderate assistance  Sit to Supine: Maximum assistance  Scooting: Maximal assistance  Bed Mobility Comments: cues to not use R UE to assist with supine to sit and for NWBing    Transfers  Sit to stand: Maximum assistance;2 Person assistance  Stand to sit: 2 Person assistance;Maximum assistance  Transfer Comments: from EOB and chair with use of Marlys Joann, max cues for direction                        Education Given To: Patient  Education Provided: Transfer Training;ADL Adaptive Strategies;Fall Prevention Strategies;Equipment;Precautions  Education Method: Verbal  Barriers to Learning: Cognition  Education Outcome: Continued education needed;Verbalized understanding                  AM-PAC - ADL  AM-PAC Daily Activity - Inpatient   How much help is needed for putting on and taking off regular lower body clothing?: Total  How much help is needed for bathing (which includes washing, rinsing, drying)?: A Lot  How much help is needed for toileting (which includes using toilet, bedpan, or urinal)?: A Lot  How much help is needed for putting on and taking off regular upper body clothing?: A Lot  How much help is needed for taking care of personal grooming?: A Little  How much help for eating meals?: A Little  AM-[facility] Inpatient Daily Activity Raw Score: 13  AM-PAC Inpatient ADL T-Scale Score : 32.03  ADL Inpatient CMS 0-100% Score: 63.03  ADL Inpatient CMS G-Code Modifier :

## 2024-01-10 NOTE — PROGRESS NOTES
Physical Therapy  Facility/Department: [address]  Physical Therapy Daily Progress Note    Name: Moises Triplett  : 1955  MRN: [id number]  Date of Service: 1/10/2024    Discharge Recommendations:  Patient would benefit from continued therapy after discharge   PT Equipment Recommendations  Equipment Needed: Yes  Other: to be determined      Patient Diagnosis(es): The primary encounter diagnosis was Displaced fracture of acromial process, right shoulder, initial encounter for closed fracture. Diagnoses of Multiple falls, Generalized weakness, Rash and other nonspecific skin eruption, and Elevated troponin were also pertinent to this visit.  Past Medical History:  has no past medical history on file.  Past Surgical History:  has no past surgical history on file.    Assessment   Body Structures, Functions, Activity Limitations Requiring Skilled Therapeutic Intervention: Decreased functional mobility ;Decreased safe awareness;Decreased sensation;Decreased balance;Increased pain  Assessment: Pt with less apprehension and anxiety with mobility today with slightly less assistance required and ability to use Marlys Stedy effectively.  Pt required Mod-Max assist for bed mobility and Max assist x 2 sit to  Marlys Stedy. Pt was able to maintain standing in Marlys Stedy this date for ~ 2 minutes with improved tolerance and posture. Pt is not safe or mobillie enough to return to previous living arrangement. Pt would benefit from continued therapy and would be able to tolerate 3 hours of therapy per day.  Therapy Prognosis: Good  Requires PT Follow-Up: Yes  Activity Tolerance  Activity Tolerance: Patient limited by endurance;Patient limited by pain     Plan   Physical Therapy Plan  General Plan:  (5-6x/week)  Current Treatment Recommendations: Balance training, Functional mobility training, Transfer training, Gait training, Pain management, Safety education & training, Therapeutic activities, Wheelchair mobility  assistance  Scooting: Moderate assistance  Bed Mobility Comments: Pt with increased time and effort due to not being able to use (R) UE and incoordination of LE's. Pt retired to chair at end of first session (for breakfast) and returned to supine/bed upon follow up session.  Transfers  Sit to Stand: Maximum Assistance;2 Person Assistance  Stand to Sit: Maximum Assistance;2 Person Assistance  Bed to Chair: Dependent/Total  Stand Pivot Transfers: Dependent/Total  Comment: Sit to  Marlys Stedy with Max assist x 2 with verbal and tactile cues to bring body weight forward and keep feet in contact with platform. Upon sitting, pt exhibited LE's coming off of platorm with (B) knee flexion and trunk extending somewhat-cues needed to correct. Pt transferred to chair for breakfast then returned to bed via Marlys Stedy later in morning. Pt stood in Marlys Stedy up to 2 minutes with Min assist x 2 to maintain with occasional verbal cues for posture and weight shift. Pt requires frequent reminders not to use (R) UE when attempting to stand.  Ambulation  Comments: not safe or tolerable at this time  Stairs/Curb  Stairs?: No     Balance  Posture: Fair  Sitting - Static: Fair  Sitting - Dynamic: Fair;-  Standing - Static: Poor  Standing - Dynamic: Poor  A/AROM Exercises: Seated LE exercises x 10 reps: heel/toe raises, LAQ's, marching.        OutComes Score                                                  AM-PAC - Mobility    AM-PAC Basic Mobility - Inpatient   How much help is needed turning from your back to your side while in a flat bed without using bedrails?: A Lot  How much help is needed moving from lying on your back to sitting on the side of a flat bed without using bedrails?: A Lot  How much help is needed moving to and from a bed to a chair?: Total  How much help is needed standing up from a chair using your arms?: Total  How much help is needed walking in hospital room?: Total  How much help is needed climbing 3-5 steps

## 2024-01-10 NOTE — PLAN OF CARE
Problem: ABCDS Injury Assessment  Goal: Absence of physical injury  Outcome: Progressing   Pt fall risk, fall band present, safety alarm activated and in use as needed. Hourly rounding performed. Pt encouraged to use call light. See Lee fall risk assessment.

## 2024-01-10 NOTE — PROGRESS NOTES
MRI states machine won't be fixed until later today and can't be done until tomorrow. Dr. Mera updated and she would still like patient to get it done, but aware it will not happen until tomorrow.

## 2024-01-11 ENCOUNTER — APPOINTMENT (OUTPATIENT)
Dept: MRI IMAGING | Age: 69
DRG: 472 | End: 2024-01-11
Payer: MEDICARE

## 2024-01-11 PROCEDURE — 2580000003 HC RX 258: Performed by: STUDENT IN AN ORGANIZED HEALTH CARE EDUCATION/TRAINING PROGRAM

## 2024-01-11 PROCEDURE — A9579 GAD-BASE MR CONTRAST NOS,1ML: HCPCS | Performed by: PSYCHIATRY & NEUROLOGY

## 2024-01-11 PROCEDURE — 72156 MRI NECK SPINE W/O & W/DYE: CPT

## 2024-01-11 PROCEDURE — 2580000003 HC RX 258: Performed by: PSYCHIATRY & NEUROLOGY

## 2024-01-11 PROCEDURE — 6370000000 HC RX 637 (ALT 250 FOR IP): Performed by: STUDENT IN AN ORGANIZED HEALTH CARE EDUCATION/TRAINING PROGRAM

## 2024-01-11 PROCEDURE — 99232 SBSQ HOSP IP/OBS MODERATE 35: CPT | Performed by: STUDENT IN AN ORGANIZED HEALTH CARE EDUCATION/TRAINING PROGRAM

## 2024-01-11 PROCEDURE — 1200000000 HC SEMI PRIVATE

## 2024-01-11 PROCEDURE — 97110 THERAPEUTIC EXERCISES: CPT

## 2024-01-11 PROCEDURE — 97535 SELF CARE MNGMENT TRAINING: CPT

## 2024-01-11 PROCEDURE — 6360000002 HC RX W HCPCS: Performed by: STUDENT IN AN ORGANIZED HEALTH CARE EDUCATION/TRAINING PROGRAM

## 2024-01-11 PROCEDURE — 97116 GAIT TRAINING THERAPY: CPT

## 2024-01-11 PROCEDURE — 97530 THERAPEUTIC ACTIVITIES: CPT

## 2024-01-11 PROCEDURE — 99232 SBSQ HOSP IP/OBS MODERATE 35: CPT | Performed by: PSYCHIATRY & NEUROLOGY

## 2024-01-11 PROCEDURE — 6370000000 HC RX 637 (ALT 250 FOR IP): Performed by: NURSE PRACTITIONER

## 2024-01-11 PROCEDURE — 6360000004 HC RX CONTRAST MEDICATION: Performed by: PSYCHIATRY & NEUROLOGY

## 2024-01-11 RX ORDER — SODIUM CHLORIDE 0.9 % (FLUSH) 0.9 %
10 SYRINGE (ML) INJECTION PRN
Status: DISCONTINUED | OUTPATIENT
Start: 2024-01-11 | End: 2024-01-12 | Stop reason: HOSPADM

## 2024-01-11 RX ADMIN — SODIUM CHLORIDE, PRESERVATIVE FREE 10 ML: 5 INJECTION INTRAVENOUS at 15:41

## 2024-01-11 RX ADMIN — HYDROCODONE BITARTRATE AND ACETAMINOPHEN 2 TABLET: 5; 325 TABLET ORAL at 09:19

## 2024-01-11 RX ADMIN — SODIUM CHLORIDE, PRESERVATIVE FREE 10 ML: 5 INJECTION INTRAVENOUS at 20:35

## 2024-01-11 RX ADMIN — ATORVASTATIN CALCIUM 10 MG: 10 TABLET, FILM COATED ORAL at 09:19

## 2024-01-11 RX ADMIN — GADOTERIDOL 12 ML: 279.3 INJECTION, SOLUTION INTRAVENOUS at 15:41

## 2024-01-11 RX ADMIN — HYDROCODONE BITARTRATE AND ACETAMINOPHEN 2 TABLET: 5; 325 TABLET ORAL at 19:11

## 2024-01-11 RX ADMIN — CARVEDILOL 3.12 MG: 3.12 TABLET, FILM COATED ORAL at 19:11

## 2024-01-11 RX ADMIN — ENOXAPARIN SODIUM 40 MG: 100 INJECTION SUBCUTANEOUS at 09:19

## 2024-01-11 RX ADMIN — CARVEDILOL 3.12 MG: 3.12 TABLET, FILM COATED ORAL at 09:19

## 2024-01-11 ASSESSMENT — PAIN SCALES - GENERAL
PAINLEVEL_OUTOF10: 5
PAINLEVEL_OUTOF10: 8
PAINLEVEL_OUTOF10: 8

## 2024-01-11 ASSESSMENT — PAIN DESCRIPTION - LOCATION
LOCATION: BACK
LOCATION: BACK
LOCATION: ARM

## 2024-01-11 ASSESSMENT — PAIN DESCRIPTION - DESCRIPTORS: DESCRIPTORS: ACHING

## 2024-01-11 ASSESSMENT — PAIN DESCRIPTION - ORIENTATION: ORIENTATION: RIGHT

## 2024-01-11 ASSESSMENT — PAIN - FUNCTIONAL ASSESSMENT: PAIN_FUNCTIONAL_ASSESSMENT: ACTIVITIES ARE NOT PREVENTED

## 2024-01-11 NOTE — PROGRESS NOTES
[facility]  Office: [phone]  Chapin Muse DO, Cyrus Dunn DO, Franky Jules DO, Danny Sexton DO, Luis Angel Gracia MD, Julee Quiñones MD, Delroy Landeros MD, Madison Collier MD,  Tony Rosales MD, Joni Dubois MD, Siola Oliva MD,  Neftali Larkin DO, Alvaro Maria MD, Juan Luis Faustin MD, Torin Muse DO, Eden Barajas MD,  Marquis Lemus DO, Yarely Molina MD, Maya West MD, Karen Adler MD, Shyam Beckman MD,  Rojas Neri MD, Axel Tello MD, Ryan Batista MD, Tima Hoffmann MD, Michael Grimes MD, Angus Hunt MD, Ramiro Burns DO, Bhupinder Mao DO, Mari Villasenor MD,  Davion Graves MD, Shirley Waterhouse, CNP,  Alycia De La Fuente, CNP, Tavon Watkins, CNP,  Katharine Merrill, LACHO, Laura Small, CNP, Brenda Jade, CNP, Malena Rossi CNP, Marya Watson, CNP, Kortney Wilson, CNP, Zabrina Obrien, PA-C, Soila Barber PA-C, Tia Mohan, CNP, Betina Saldivar, CNS, Carmen Worley, CNP, Mamie Crain, CNP, Tracy Schwab, CNP         [facility]   IN-PATIENT SERVICE   [facility]    Progress Note    1/11/2024    5:29 PM    Name:   Moises Triplett  MRN:     [id number]     Acct:      [id number]   Room:   [address] [place] [address] Day:  1  Admit Date:  1/8/2024  1:31 PM    PCP:   No primary care provider on file.  Code Status:  Full Code    Subjective:     Pt seen and examined. Denies CP, SOB.  Tolerating PO diet.   MRI cervical spine still pending.  Neuro following.    Medications:     Allergies:    Allergies   Allergen Reactions    Sulfa Antibiotics Rash       Current Meds:   Scheduled Meds:    nicotine  1 patch TransDERmal Daily    carvedilol  3.125 mg Oral BID WC    sodium chloride flush  10 mL IntraVENous Once    atorvastatin  10 mg Oral Daily    sodium chloride flush  5-40 mL IntraVENous 2 times per day    enoxaparin  40 mg SubCUTAneous Daily     Continuous Infusions:    sodium chloride       PRN Meds: sodium chloride flush, sodium chloride flush, sodium chloride,  uncomplicated 1/8/2024 Yes    Essential (primary) hypertension 1/8/2024 Yes    Hyperlipidemia, unspecified 1/8/2024 Yes    Displaced fracture of acromial process, right shoulder, initial encounter for closed fracture 1/9/2024 Yes    Multiple falls 1/10/2024 Yes    Rash and other nonspecific skin eruption 1/10/2024 Yes     Plan:     Physical debility   -The patient states that he has been falling frequently and does not feel that he can be safely discharged home   -The patient is interested in ARU placement   -The patient states that his falls are secondary to headaches and dizziness   -S/p MRI of the brain suggestive of chronic microvascular disease.  Pending cervical spine .   -Neurology following.     Right nondisplaced acromion fracture   -X-ray of the right shoulder is showing a suspected nondisplaced fracture at the junction of the acromion and scapula   -Patient was evaluated by orthopedic surgery, as per Ortho note fracture should heal without any surgical intervention, continue sling for comfort and okay with him coming out of it.  And outpatient follow-up with orthopedic in 6 weeks.     Elevated blood pressure  Patient does not have documented history of hypertension.  His blood pressure was elevated overnight.  Started him on Coreg 3.125 twice daily.  Continue to monitor vitals.  Will adjust Coreg accordingly.    Elevated troponin : No concern for ACS  -The patient is chest-pain free  -S/p echo, suggestive of EF 55-60%.  Normal wall motion.     Dermatitis .  -Rash has resolved   -S/p prednisone    Discharge planning: Patient accepted at [facility], although he is insisting on [facility].   spoke to [facility] and patient was declined by them.  Plan is to discharge the patient to [facility] after he gets MRI spine done and okay with neurology.      Michael Grimes MD  1/11/2024  5:29 PM

## 2024-01-11 NOTE — PROGRESS NOTES
01/11/24 1124   Encounter Summary   Encounter Overview/Reason  Volunteer Encounter   Service Provided For: Patient   Referral/Consult From: Rounding   Last Encounter  01/11/24   Complexity of Encounter Low   Spiritual/Emotional needs   Type Spiritual Support

## 2024-01-11 NOTE — PROGRESS NOTES
Occupational Therapy  Facility/Department: [address]  Occupational Therapy Daily Treatment Note    Name: Moises Triplett  : 1955  MRN: [id number]  Date of Service: 2024    Discharge Recommendations:  Patient would benefit from continued therapy after discharge  OT Equipment Recommendations  Other: continue to assess       Patient Diagnosis(es): The primary encounter diagnosis was Displaced fracture of acromial process, right shoulder, initial encounter for closed fracture. Diagnoses of Multiple falls, Generalized weakness, Rash and other nonspecific skin eruption, and Elevated troponin were also pertinent to this visit.  Past Medical History:  has no past medical history on file.  Past Surgical History:  has no past surgical history on file.           Assessment   Performance deficits / Impairments: Decreased functional mobility ;Decreased ADL status;Decreased balance;Decreased safe awareness  Assessment: Pt progressing towards STGs. Patient continues to demonstrate above deficits. Patient would continue to benefit from skilled OT services while here at hospital and following discharge to maximize safety and independence. Patient is currently unsafe to return to prior living arrangements due to inability to stand/ambulate/complete ADLs.  Prognosis: Good;Fair  REQUIRES OT FOLLOW-UP: Yes  Activity Tolerance  Activity Tolerance: Patient Tolerated treatment well  Activity Tolerance Comments: sat EOB ~8 min with SBA-MOD, verbose with constant cues to remain on task and follow directions.        Plan   Occupational Therapy Plan  Times Per Week: 5-6x/wk  Current Treatment Recommendations: Balance training, Functional mobility training, Safety education & training, Patient/Caregiver education & training, Equipment evaluation, education, & procurement, Self-Care / ADL     Restrictions  Restrictions/Precautions  Restrictions/Precautions: Fall Risk, Weight Bearing  Required Braces or Orthoses?: Yes  Upper Extremity  assistance  Bed Mobility Comments: Pt sat on side of bed for ~8 minutes SBA    Transfers  Sit to stand: Maximum assistance;2 Person assistance  Stand to sit: 2 Person assistance;Maximum assistance  Transfer Comments: from EOB with therapist on one side and then other at knees/feet, patient extending at trunk and LEs, unable to keep feet on ground. In Marlys David able to stand with MOD x1 and then sit from Marlys David MOD x1. Patient assist fluctuates unexceptedly from 1-2 assist, patient is not aware of legs/feet coming off floor.     Cognition  Overall Cognitive Status: Exceptions  Following Commands: Follows all commands without difficulty  Attention Span: Attends with cues to redirect (pt easily distracted. Talkative)  Memory:  (Pt continues to weightbear thru RUE despite pt recalling precautions)  Safety Judgement: Decreased awareness of need for safety  Problem Solving: Decreased awareness of errors;Assistance required to correct errors made  Insights: Decreased awareness of deficits  Initiation: Requires cues for some  Sequencing: Requires cues for some  Orientation  Overall Orientation Status: Within Functional Limits  Orientation Level: Oriented X4                  Education Given To: Patient  Education Provided: Transfer Training;ADL Adaptive Strategies;Fall Prevention Strategies;Equipment;Precautions  Education Method: Verbal  Barriers to Learning: Cognition  Education Outcome: Continued education needed;Verbalized understanding                   AM-PAC - ADL  AM-PAC Daily Activity - Inpatient   How much help is needed for putting on and taking off regular lower body clothing?: Total  How much help is needed for bathing (which includes washing, rinsing, drying)?: A Lot  How much help is needed for toileting (which includes using toilet, bedpan, or urinal)?: A Lot  How much help is needed for putting on and taking off regular upper body clothing?: A Lot  How much help is needed for taking care of personal

## 2024-01-11 NOTE — CASE COMMUNICATION
Writer placed call to MRI, machine is still down. They are awaiting to hear back from tech regarding whether or not they will received part that is expected today at noon.

## 2024-01-11 NOTE — PROGRESS NOTES
Physical Therapy  Facility/Department: [address]  Physical Therapy Daily Treatment Note    Name: Moises Triplett  : 1955  MRN: [id number]  Date of Service: 2024    Discharge Recommendations:  Patient would benefit from continued therapy after discharge          Patient Diagnosis(es): The primary encounter diagnosis was Displaced fracture of acromial process, right shoulder, initial encounter for closed fracture. Diagnoses of Multiple falls, Generalized weakness, Rash and other nonspecific skin eruption, and Elevated troponin were also pertinent to this visit.  Past Medical History:  has no past medical history on file.  Past Surgical History:  has no past surgical history on file.    Assessment   Body Structures, Functions, Activity Limitations Requiring Skilled Therapeutic Intervention: Decreased functional mobility ;Decreased safe awareness;Decreased sensation;Decreased balance;Increased pain    Assessment: Pt with less apprehension and anxiety with mobility today with slightly less assistance required and ability to use Marlys Stedy more effectively.  Pt required CGA assist for supine to sit with bed rail (noted increase time and effort) and Max assist x 2 sit /stand with HHA. Pt was mod x 1 sit/stand from edge of bed into Marlys Stedy. Pt performed repetitive sit/stands in Marlys Stedy initially at mod x 1 progressing to min x 1.  Pt is not safe or mobillie enough to return to previous living arrangement. Pt should be able to tolerate at least 3 hrs of therapy per day over 5 days or 15 hours over 7 days.    Requires PT Follow-Up: Yes    Activity Tolerance  Activity Tolerance Comments: pt limited by fear of falling and decrease attention span     Plan   Physical Therapy Plan  General Plan:  (5-6x/week)  Current Treatment Recommendations: Balance training, Functional mobility training, Transfer training, Gait training, Pain management, Safety education & training, Therapeutic activities, Wheelchair mobility

## 2024-01-11 NOTE — PROGRESS NOTES
[facility] Neurology Specialist  [address]  [place] [address]  PH:  [phone] or [phone]  FAX:  [phone]            Brief history: Moises Triplett is a 68 y.o. old male admitted on 2024 with  falls.      Subjective: MRI C-spine pending. Discussed with nursing staff and primary team.      Objective: BP (!) 135/113   Pulse 91   Temp 97.7 °F (36.5 °C) (Oral)   Resp 18   Ht 1.727 m (5' 8\")   Wt 57 kg (125 lb 10.6 oz)   SpO2 96%   BMI 19.11 kg/m²       Medications:    nicotine  1 patch TransDERmal Daily    carvedilol  3.125 mg Oral BID WC    sodium chloride flush  10 mL IntraVENous Once    atorvastatin  10 mg Oral Daily    sodium chloride flush  5-40 mL IntraVENous 2 times per day    enoxaparin  40 mg SubCUTAneous Daily          Physical Exam:   BP (!) 135/113   Pulse 91   Temp 97.7 °F (36.5 °C) (Oral)   Resp 18   Ht 1.727 m (5' 8\")   Wt 57 kg (125 lb 10.6 oz)   SpO2 96%   BMI 19.11 kg/m²   Temp (24hrs), Av.5 °F (36.4 °C), Min:97.2 °F (36.2 °C), Max:98.1 °F (36.7 °C)        General examination:       General Appearance:  alert, well appearing, and in no acute distress  HEENT: Normocephalic, atraumatic, moist mucus membranes  Neck: supple, no carotid bruits, (-) nuchal rigidity  Lungs:  Respirations unlabored, chest wall no deformity, BS normal  Cardiovascular: normal rate, regular rhythm  Abdomen: Soft, nontender, nondistended, normal bowel sounds  Skin: +bruising   Extremities:  + RUE sling, bandage over L ankle           Neurological examination:     Mental status    Alert and oriented x 3; following all commands; speech is fluent, no dysarthria, aphasia.       Cranial nerves    II - visual fields intact to confrontation; pupils reactive  III, IV, VI - extraocular muscles intact; no RODRÍGUEZ; no nystagmus; no ptosis   V - normal facial sensation                                                               VII - normal facial symmetry                                                              VIII - intact hearing                                                                             IX, X - symmetrical palate elevation                                               XI - symmetrical shoulder shrug                                                       XII - midline tongue without atrophy or fasciculation      Motor function  Strength: 5/5 RUE, 5/5 RLE, 5/5 LUE, 5/5  LLE except RUE limited due to shoulder sling   Normal bulk and tone.   No tremors                       Sensory function Decreased to touch, pin, vibration, proprioception throughout      Cerebellar Intact finger-nose-finger testing. Intact heel-shin testing. No dysdiadochokinesia present.       Reflex function 2/4 symmetric throughout . Downgoing plantar response bilaterally. (-)Norton's sign bilaterally    Gait                  Not safely tested                  Diagnostics:      Laboratory Testing:  CBC:   Recent Labs     01/08/24  1415 01/09/24  0554   WBC 6.6 5.2   HGB 13.0* 12.1*    478*       BMP:    Recent Labs     01/08/24  1415 01/09/24  0554    134*   K 3.9 4.0   CL 97* 99   CO2 24 24   BUN 21 26*   CREATININE 0.6* 0.6*   GLUCOSE 84 94           Lab Results   Component Value Date    ALT 29 01/08/2024    AST 39 01/08/2024    INR 0.97 06/12/2023    [id number] 992 01/09/2024    MG 2.2 01/08/2024       No results found for: \"PHENYTOIN\", \"PHENOBARB\", \"VALPROATE\", \"CBMZ\"      Imaging/Diagnostics:    Results for orders placed during the hospital encounter of 01/08/24    MRI BRAIN W WO CONTRAST    Narrative  EXAMINATION:  MRI OF THE BRAIN WITHOUT AND WITH CONTRAST  1/9/2024 10:58 am    TECHNIQUE:  Multiplanar multisequence MRI of the head/brain was performed without and  with the administration of intravenous contrast.    COMPARISON:  None.    HISTORY:  ORDERING SYSTEM PROVIDED HISTORY: frequent falls  TECHNOLOGIST PROVIDED HISTORY:  frequent falls  Reason for Exam: Frequent falls  Additional  signs and symptoms: best images possible, leg movements, pt  medicated    FINDINGS:  INTRACRANIAL STRUCTURES/VENTRICLES:  The sellar and suprasellar structures,  optic chiasm, corpus callosum, pineal gland, tectum, and midline brainstem  structures are unremarkable.  The craniocervical junction is unremarkable.  There is no acute hemorrhage, mass effect, or midline shift.  There is  satisfactory overall gray-white matter differentiation.  There is chronic  microvascular disease.  The ventricular structures are symmetric and  unremarkable.  The infratentorial structures including the cerebellopontine  angles and internal auditory canals are unremarkable.  There is no abnormal  restricted diffusion.  There is no abnormal blooming artifact on  susceptibility weighted imaging.  No abnormal postcontrast enhancement.    ORBITS: The visualized portion of the orbits demonstrate no acute abnormality.    SINUSES: The visualized paranasal sinuses and mastoid air cells demonstrate  no acute abnormality.    BONES/SOFT TISSUES: The bone marrow signal intensity appears normal. The soft  tissues demonstrate no acute abnormality.    Impression  Chronic microvascular disease without acute intracranial abnormality.    No results found for this or any previous visit.    Results for orders placed during the hospital encounter of 01/08/24    CT Head W/O Contrast    Narrative  EXAMINATION:  CT OF THE HEAD WITHOUT CONTRAST  1/8/2024 1:25 pm    TECHNIQUE:  CT of the head was performed without the administration of intravenous  contrast. Automated exposure control, iterative reconstruction, and/or weight  based adjustment of the mA/kV was utilized to reduce the radiation dose to as  low as reasonably achievable.    COMPARISON:  CT brain 11/29/2021    HISTORY:  ORDERING SYSTEM PROVIDED HISTORY: Repetitive falls, headache  TECHNOLOGIST PROVIDED HISTORY:  Repetitive falls, headache    Decision Support Exception - unselect if not a suspected or  confirmed  emergency medical condition->Emergency Medical Condition (MA)  Reason for Exam: pt states repetitive falls, headaches, pain    FINDINGS:  BRAIN/VENTRICLES: The middle cranial fossa and posterior fossa are partially  obscured by patient motion artifact.  There is no acute intracranial  hemorrhage, mass effect or midline shift.  No abnormal extra-axial fluid  collection.  The gray-white differentiation is maintained without evidence of  an acute infarct.  There is mild diffuse parenchymal atrophy.  There is no  evidence of hydrocephalus. Mild bilateral periventricular hypodensities are  nonspecific, but compatible with chronic microvascular ischemic change.    ORBITS: The visualized portion of the orbits demonstrate no acute abnormality.    SINUSES: The visualized paranasal sinuses and mastoid air cells demonstrate  no acute abnormality.    SOFT TISSUES/SKULL:  No acute abnormality of the visualized skull or soft  tissues.    Impression  No acute intracranial abnormality.      I personally reviewed all of the above medications, clinical laboratory, imaging and other diagnostic tests.       Impression:       This is a 68 year old male who since fractured his ankle back in 2022 has noted more falls which have increased over the past 10 days. It is unclear etiology of ataxia/falls at this time. He appears unable to care for himself.      Plan:      Mri brain and cervical spine with and without contrast- Mri brain showed NAP and MRI C-spine pending  Ortho notes reviewed  Peripheral neuropathy labs- thus far unrevealing   UDS- positive for cannbinoids and opiates   PT/OT- will likely benefit from ARU- plan for [facility] rehab after MRI C-spine   Patient does have a rash on his LE (?scabies?)    Would benefit from outpatient EMG with [facility]   Further recs to follow pending the above     This note is created with the assistance of a speech-recognition program. While intending to generate a document that actually

## 2024-01-12 ENCOUNTER — HOSPITAL ENCOUNTER (INPATIENT)
Age: 69
LOS: 11 days | Discharge: NURSING, REHAB OR LONG-TERM CARE | DRG: 472 | End: 2024-01-23
Attending: INTERNAL MEDICINE | Admitting: FAMILY MEDICINE
Payer: MEDICARE

## 2024-01-12 VITALS
RESPIRATION RATE: 18 BRPM | HEIGHT: 68 IN | WEIGHT: 125.66 LBS | BODY MASS INDEX: 19.05 KG/M2 | SYSTOLIC BLOOD PRESSURE: 163 MMHG | TEMPERATURE: 97.5 F | OXYGEN SATURATION: 100 % | DIASTOLIC BLOOD PRESSURE: 99 MMHG | HEART RATE: 83 BPM

## 2024-01-12 DIAGNOSIS — M48.02 CERVICAL STENOSIS OF SPINAL CANAL: ICD-10-CM

## 2024-01-12 DIAGNOSIS — S42.124A CLOSED NONDISPLACED FRACTURE OF ACROMIAL PROCESS OF RIGHT SCAPULA, INITIAL ENCOUNTER: ICD-10-CM

## 2024-01-12 DIAGNOSIS — R29.6 MULTIPLE FALLS: ICD-10-CM

## 2024-01-12 DIAGNOSIS — Z98.1 S/P CERVICAL SPINAL FUSION: Primary | ICD-10-CM

## 2024-01-12 LAB
CRP SERPL HS-MCNC: 3.4 MG/L (ref 0–5)
ERYTHROCYTE [SEDIMENTATION RATE] IN BLOOD BY PHOTOMETRIC METHOD: 8 MM/HR (ref 0–20)

## 2024-01-12 PROCEDURE — 6360000002 HC RX W HCPCS: Performed by: STUDENT IN AN ORGANIZED HEALTH CARE EDUCATION/TRAINING PROGRAM

## 2024-01-12 PROCEDURE — 6370000000 HC RX 637 (ALT 250 FOR IP): Performed by: STUDENT IN AN ORGANIZED HEALTH CARE EDUCATION/TRAINING PROGRAM

## 2024-01-12 PROCEDURE — 2580000003 HC RX 258: Performed by: STUDENT IN AN ORGANIZED HEALTH CARE EDUCATION/TRAINING PROGRAM

## 2024-01-12 PROCEDURE — 6370000000 HC RX 637 (ALT 250 FOR IP): Performed by: NURSE PRACTITIONER

## 2024-01-12 PROCEDURE — 86140 C-REACTIVE PROTEIN: CPT

## 2024-01-12 PROCEDURE — 99233 SBSQ HOSP IP/OBS HIGH 50: CPT | Performed by: STUDENT IN AN ORGANIZED HEALTH CARE EDUCATION/TRAINING PROGRAM

## 2024-01-12 PROCEDURE — 1200000000 HC SEMI PRIVATE

## 2024-01-12 PROCEDURE — 85652 RBC SED RATE AUTOMATED: CPT

## 2024-01-12 PROCEDURE — 36415 COLL VENOUS BLD VENIPUNCTURE: CPT

## 2024-01-12 RX ORDER — ACETAMINOPHEN 650 MG/1
650 SUPPOSITORY RECTAL EVERY 6 HOURS PRN
Status: CANCELLED | OUTPATIENT
Start: 2024-01-12

## 2024-01-12 RX ORDER — NICOTINE 21 MG/24HR
1 PATCH, TRANSDERMAL 24 HOURS TRANSDERMAL DAILY
Status: CANCELLED | OUTPATIENT
Start: 2024-01-13

## 2024-01-12 RX ORDER — HYDROCODONE BITARTRATE AND ACETAMINOPHEN 5; 325 MG/1; MG/1
2 TABLET ORAL EVERY 6 HOURS PRN
Status: DISCONTINUED | OUTPATIENT
Start: 2024-01-12 | End: 2024-01-16

## 2024-01-12 RX ORDER — ENOXAPARIN SODIUM 100 MG/ML
40 INJECTION SUBCUTANEOUS DAILY
Status: CANCELLED | OUTPATIENT
Start: 2024-01-13

## 2024-01-12 RX ORDER — POTASSIUM CHLORIDE 20 MEQ/1
40 TABLET, EXTENDED RELEASE ORAL PRN
Status: DISCONTINUED | OUTPATIENT
Start: 2024-01-12 | End: 2024-01-23 | Stop reason: HOSPADM

## 2024-01-12 RX ORDER — ONDANSETRON 2 MG/ML
4 INJECTION INTRAMUSCULAR; INTRAVENOUS EVERY 6 HOURS PRN
Status: DISCONTINUED | OUTPATIENT
Start: 2024-01-12 | End: 2024-01-23 | Stop reason: HOSPADM

## 2024-01-12 RX ORDER — SODIUM CHLORIDE 0.9 % (FLUSH) 0.9 %
5-40 SYRINGE (ML) INJECTION PRN
Status: DISCONTINUED | OUTPATIENT
Start: 2024-01-12 | End: 2024-01-23 | Stop reason: HOSPADM

## 2024-01-12 RX ORDER — SODIUM CHLORIDE 0.9 % (FLUSH) 0.9 %
10 SYRINGE (ML) INJECTION PRN
Status: CANCELLED | OUTPATIENT
Start: 2024-01-12

## 2024-01-12 RX ORDER — ONDANSETRON 2 MG/ML
4 INJECTION INTRAMUSCULAR; INTRAVENOUS EVERY 6 HOURS PRN
Status: CANCELLED | OUTPATIENT
Start: 2024-01-12

## 2024-01-12 RX ORDER — SODIUM CHLORIDE 0.9 % (FLUSH) 0.9 %
5-40 SYRINGE (ML) INJECTION PRN
Status: CANCELLED | OUTPATIENT
Start: 2024-01-12

## 2024-01-12 RX ORDER — NICOTINE 21 MG/24HR
1 PATCH, TRANSDERMAL 24 HOURS TRANSDERMAL DAILY
Status: DISCONTINUED | OUTPATIENT
Start: 2024-01-13 | End: 2024-01-23

## 2024-01-12 RX ORDER — ATORVASTATIN CALCIUM 10 MG/1
10 TABLET, FILM COATED ORAL DAILY
Status: CANCELLED | OUTPATIENT
Start: 2024-01-13

## 2024-01-12 RX ORDER — ACETAMINOPHEN 325 MG/1
650 TABLET ORAL EVERY 6 HOURS PRN
Status: CANCELLED | OUTPATIENT
Start: 2024-01-12

## 2024-01-12 RX ORDER — HYDROCODONE BITARTRATE AND ACETAMINOPHEN 5; 325 MG/1; MG/1
1 TABLET ORAL EVERY 6 HOURS PRN
Status: CANCELLED | OUTPATIENT
Start: 2024-01-12

## 2024-01-12 RX ORDER — SODIUM CHLORIDE 0.9 % (FLUSH) 0.9 %
10 SYRINGE (ML) INJECTION PRN
Status: DISCONTINUED | OUTPATIENT
Start: 2024-01-12 | End: 2024-01-23 | Stop reason: HOSPADM

## 2024-01-12 RX ORDER — ENOXAPARIN SODIUM 100 MG/ML
40 INJECTION SUBCUTANEOUS DAILY
Status: DISCONTINUED | OUTPATIENT
Start: 2024-01-13 | End: 2024-01-16

## 2024-01-12 RX ORDER — ONDANSETRON 4 MG/1
4 TABLET, ORALLY DISINTEGRATING ORAL EVERY 8 HOURS PRN
Status: CANCELLED | OUTPATIENT
Start: 2024-01-12

## 2024-01-12 RX ORDER — POTASSIUM CHLORIDE 20 MEQ/1
40 TABLET, EXTENDED RELEASE ORAL PRN
Status: CANCELLED | OUTPATIENT
Start: 2024-01-12

## 2024-01-12 RX ORDER — SODIUM CHLORIDE 9 MG/ML
INJECTION, SOLUTION INTRAVENOUS PRN
Status: CANCELLED | OUTPATIENT
Start: 2024-01-12

## 2024-01-12 RX ORDER — ACETAMINOPHEN 325 MG/1
650 TABLET ORAL EVERY 6 HOURS PRN
Status: DISCONTINUED | OUTPATIENT
Start: 2024-01-12 | End: 2024-01-23 | Stop reason: HOSPADM

## 2024-01-12 RX ORDER — SODIUM CHLORIDE 0.9 % (FLUSH) 0.9 %
5-40 SYRINGE (ML) INJECTION EVERY 12 HOURS SCHEDULED
Status: DISCONTINUED | OUTPATIENT
Start: 2024-01-12 | End: 2024-01-23 | Stop reason: HOSPADM

## 2024-01-12 RX ORDER — SODIUM CHLORIDE 0.9 % (FLUSH) 0.9 %
5-40 SYRINGE (ML) INJECTION EVERY 12 HOURS SCHEDULED
Status: CANCELLED | OUTPATIENT
Start: 2024-01-12

## 2024-01-12 RX ORDER — MAGNESIUM SULFATE IN WATER 40 MG/ML
2000 INJECTION, SOLUTION INTRAVENOUS PRN
Status: DISCONTINUED | OUTPATIENT
Start: 2024-01-12 | End: 2024-01-23 | Stop reason: HOSPADM

## 2024-01-12 RX ORDER — ACETAMINOPHEN 650 MG/1
650 SUPPOSITORY RECTAL EVERY 6 HOURS PRN
Status: DISCONTINUED | OUTPATIENT
Start: 2024-01-12 | End: 2024-01-23 | Stop reason: HOSPADM

## 2024-01-12 RX ORDER — POLYETHYLENE GLYCOL 3350 17 G/17G
17 POWDER, FOR SOLUTION ORAL DAILY PRN
Status: DISCONTINUED | OUTPATIENT
Start: 2024-01-12 | End: 2024-01-16

## 2024-01-12 RX ORDER — POTASSIUM CHLORIDE 7.45 MG/ML
10 INJECTION INTRAVENOUS PRN
Status: DISCONTINUED | OUTPATIENT
Start: 2024-01-12 | End: 2024-01-23 | Stop reason: HOSPADM

## 2024-01-12 RX ORDER — CARVEDILOL 3.12 MG/1
3.12 TABLET ORAL 2 TIMES DAILY WITH MEALS
Status: DISCONTINUED | OUTPATIENT
Start: 2024-01-13 | End: 2024-01-17

## 2024-01-12 RX ORDER — MAGNESIUM SULFATE IN WATER 40 MG/ML
2000 INJECTION, SOLUTION INTRAVENOUS PRN
Status: CANCELLED | OUTPATIENT
Start: 2024-01-12

## 2024-01-12 RX ORDER — POLYETHYLENE GLYCOL 3350 17 G/17G
17 POWDER, FOR SOLUTION ORAL DAILY PRN
Status: CANCELLED | OUTPATIENT
Start: 2024-01-12

## 2024-01-12 RX ORDER — SODIUM CHLORIDE 9 MG/ML
INJECTION, SOLUTION INTRAVENOUS PRN
Status: DISCONTINUED | OUTPATIENT
Start: 2024-01-12 | End: 2024-01-23 | Stop reason: HOSPADM

## 2024-01-12 RX ORDER — POTASSIUM CHLORIDE 7.45 MG/ML
10 INJECTION INTRAVENOUS PRN
Status: CANCELLED | OUTPATIENT
Start: 2024-01-12

## 2024-01-12 RX ORDER — ONDANSETRON 4 MG/1
4 TABLET, ORALLY DISINTEGRATING ORAL EVERY 8 HOURS PRN
Status: DISCONTINUED | OUTPATIENT
Start: 2024-01-12 | End: 2024-01-23 | Stop reason: HOSPADM

## 2024-01-12 RX ORDER — ATORVASTATIN CALCIUM 10 MG/1
10 TABLET, FILM COATED ORAL DAILY
Status: DISCONTINUED | OUTPATIENT
Start: 2024-01-13 | End: 2024-01-23 | Stop reason: HOSPADM

## 2024-01-12 RX ORDER — CARVEDILOL 3.12 MG/1
3.12 TABLET ORAL 2 TIMES DAILY WITH MEALS
Status: CANCELLED | OUTPATIENT
Start: 2024-01-12

## 2024-01-12 RX ORDER — HYDROCODONE BITARTRATE AND ACETAMINOPHEN 5; 325 MG/1; MG/1
1 TABLET ORAL EVERY 6 HOURS PRN
Status: DISCONTINUED | OUTPATIENT
Start: 2024-01-12 | End: 2024-01-16

## 2024-01-12 RX ORDER — HYDROCODONE BITARTRATE AND ACETAMINOPHEN 5; 325 MG/1; MG/1
2 TABLET ORAL EVERY 6 HOURS PRN
Status: CANCELLED | OUTPATIENT
Start: 2024-01-12

## 2024-01-12 RX ADMIN — ATORVASTATIN CALCIUM 10 MG: 10 TABLET, FILM COATED ORAL at 10:33

## 2024-01-12 RX ADMIN — SODIUM CHLORIDE, PRESERVATIVE FREE 10 ML: 5 INJECTION INTRAVENOUS at 10:26

## 2024-01-12 RX ADMIN — SODIUM CHLORIDE, PRESERVATIVE FREE 10 ML: 5 INJECTION INTRAVENOUS at 20:37

## 2024-01-12 RX ADMIN — HYDROCODONE BITARTRATE AND ACETAMINOPHEN 2 TABLET: 5; 325 TABLET ORAL at 10:34

## 2024-01-12 RX ADMIN — HYDROCODONE BITARTRATE AND ACETAMINOPHEN 2 TABLET: 5; 325 TABLET ORAL at 20:41

## 2024-01-12 RX ADMIN — ENOXAPARIN SODIUM 40 MG: 100 INJECTION SUBCUTANEOUS at 10:33

## 2024-01-12 RX ADMIN — CARVEDILOL 3.12 MG: 3.12 TABLET, FILM COATED ORAL at 10:33

## 2024-01-12 ASSESSMENT — PAIN DESCRIPTION - ORIENTATION
ORIENTATION: RIGHT
ORIENTATION: RIGHT
ORIENTATION: RIGHT;LEFT
ORIENTATION: RIGHT;LEFT

## 2024-01-12 ASSESSMENT — PAIN SCALES - GENERAL
PAINLEVEL_OUTOF10: 0
PAINLEVEL_OUTOF10: 8
PAINLEVEL_OUTOF10: 9
PAINLEVEL_OUTOF10: 8
PAINLEVEL_OUTOF10: 0
PAINLEVEL_OUTOF10: 8
PAINLEVEL_OUTOF10: 8
PAINLEVEL_OUTOF10: 1

## 2024-01-12 ASSESSMENT — PAIN DESCRIPTION - FREQUENCY: FREQUENCY: CONTINUOUS

## 2024-01-12 ASSESSMENT — PAIN DESCRIPTION - DESCRIPTORS
DESCRIPTORS: DULL;DISCOMFORT
DESCRIPTORS: PRESSURE;SHARP
DESCRIPTORS: DISCOMFORT;ACHING
DESCRIPTORS: DULL;DISCOMFORT;ACHING

## 2024-01-12 ASSESSMENT — PAIN DESCRIPTION - LOCATION
LOCATION: SHOULDER;HAND
LOCATION: BACK;NECK;SHOULDER
LOCATION: SHOULDER
LOCATION: NECK;SHOULDER;BACK

## 2024-01-12 ASSESSMENT — PAIN SCALES - WONG BAKER
WONGBAKER_NUMERICALRESPONSE: 0
WONGBAKER_NUMERICALRESPONSE: 2
WONGBAKER_NUMERICALRESPONSE: 2

## 2024-01-12 ASSESSMENT — PAIN - FUNCTIONAL ASSESSMENT
PAIN_FUNCTIONAL_ASSESSMENT: PREVENTS OR INTERFERES SOME ACTIVE ACTIVITIES AND ADLS
PAIN_FUNCTIONAL_ASSESSMENT: ACTIVITIES ARE NOT PREVENTED

## 2024-01-12 ASSESSMENT — PAIN DESCRIPTION - ONSET: ONSET: ON-GOING

## 2024-01-12 ASSESSMENT — PAIN DESCRIPTION - PAIN TYPE: TYPE: CHRONIC PAIN

## 2024-01-12 NOTE — PROGRESS NOTES
Occupational Therapy    [facility]  Occupational Therapy Not Seen Note    DATE: 2024    NAME: Moises Triplett  MRN: [id number]   : 1955      Patient not seen this date for Occupational Therapy due to:    Other: defere tx this date due to possible transfer to [facility] for neuro sx consult.    Next Scheduled Treatment: will continue to pursue as able    Electronically signed by CATHY HERRERA OT on 2024 at 3:27 PM

## 2024-01-12 NOTE — PROGRESS NOTES
Patient advised of transfer to [facility] and reason for transfer. Patient accepting of transfer. Report called to Caridad CHAO at [facility]. Patient's belongings packed. Report given to [facility] [employer]. Patient discharge to the care of [facility] [employer] for transportation to [facility].

## 2024-01-12 NOTE — PROGRESS NOTES
Physical Therapy        Physical Therapy Cancel Note      DATE: 2024    NAME: Moises Triplett  MRN: [id number]   : 1955      Patient not seen this date for Physical Therapy due to:    Other: MRI cervical spine shows concern for discitis/osteomyelitis and possible phlegmon; multilevel degenerative disc disease, severe canal stenosis and spinal cord compression at C4-5 and C5-6. Neurology recommends intervention, transfer to [facility]       Electronically signed by EMILY OLVERA, PT on 2024 at 3:23 PM

## 2024-01-12 NOTE — PROGRESS NOTES
NEUROLOGY INPATIENT PROGRESS NOTE    1/12/2024         Subjective: Moises Triplett is a  68 y.o. male admitted on 1/8/2024 with Rash and other nonspecific skin eruption [R21]  Generalized weakness [R53.1]  Elevated troponin [R79.89]  Physical debility [R53.81]  Multiple falls [R29.6]  Displaced fracture of acromial process, right shoulder, initial encounter for closed fracture [S42.121A]      Briefly, this is a  68 y.o. male admitted on 1/8/2024 with with falls.  Patient has had MRI cervical spine demonstrating cord compression.  Patient was informed about the findings on cervical spine MRI over the phone in the presence of patient's nurse.  Called and spoke to patient's nurse now as I am on my way to [facility] to see the patient.  She stated that the patient is presently being transferred to [facility] for neurosurgery consultation for evaluation management of spinal cord compression.  I'm just done at [facility] and I called patient's nurse in [facility]. She told me that patient is being transferred now to [facility] for neurosurgical consultation.  As patient is presently being transferred to [facility] now; patient would not be available for me to see him at [facility].  Patient and his nurse verbalized understanding those instructions.      No current facility-administered medications on file prior to encounter.     Current Outpatient Medications on File Prior to Encounter   Medication Sig Dispense Refill    ibuprofen (ADVIL;MOTRIN) 600 MG tablet       atorvastatin (LIPITOR) 10 MG tablet Take 1 tablet by mouth daily         Allergies: Moises Triplett is allergic to sulfa antibiotics.    History reviewed. No pertinent past medical history.    History reviewed. No pertinent surgical history.        Medications:     nicotine  1 patch TransDERmal Daily    carvedilol  3.125 mg Oral BID WC    sodium chloride flush  10 mL IntraVENous Once    atorvastatin  10  mg Oral Daily    sodium chloride flush  5-40 mL IntraVENous 2 times per day    enoxaparin  40 mg SubCUTAneous Daily     PRN Meds include: sodium chloride flush, sodium chloride flush, sodium chloride, potassium chloride **OR** potassium alternative oral replacement **OR** potassium chloride, magnesium sulfate, ondansetron **OR** ondansetron, polyethylene glycol, acetaminophen **OR** acetaminophen, HYDROcodone 5 mg - acetaminophen **OR** HYDROcodone 5 mg - acetaminophen    Objective:   BP (!) 163/99   Pulse 83   Temp 97.5 °F (36.4 °C) (Axillary)   Resp 18   Ht 1.727 m (5' 8\")   Wt 57 kg (125 lb 10.6 oz)   SpO2 100%   BMI 19.11 kg/m²     Blood pressure range: Systolic (24hrs), Av , Min:132 , Max:163   ; Diastolic (24hrs), Av, Min:83, Max:103        Data:    Lab Results:   CBC: No results for input(s): \"WBC\", \"HGB\", \"PLT\" in the last 72 hours.  BMP:  No results for input(s): \"NA\", \"K\", \"CL\", \"CO2\", \"BUN\", \"CREATININE\", \"GLUCOSE\" in the last 72 hours.  No results for input(s): \"COLORU\", \"PHUR\", \"LABCAST\", \"WBCUA\", \"RBCUA\", \"MUCUS\", \"TRICHOMONAS\", \"YEAST\", \"BACTERIA\", \"CLARITYU\", \"SPECGRAV\", \"LEUKOCYTESUR\", \"UROBILINOGEN\", \"BILIRUBINUR\", \"BLOODU\" in the last 72 hours.    Invalid input(s): \"NITRATE\", \"GLUCOSEUKETONESUAMORPHOUS\"     Lab Results   Component Value Date    ALT 29 2024    AST 39 2024    INR 0.97 2023    [id number] 992 2024    MG 2.2 2024     MRI Cervical Spine (w/wo) 2024:   1. Abnormal signal and enhancement involving the C3 through C6 vertebral bodies. Abnormal enhancing material within the ventral cervical spinal canal extending from C3-4 through C6-7 with abnormal prevertebral soft tissue  signal and enhancement extending from C4 through C7.  While findings could be degenerative/inflammatory, these are concerning for concerning for discitis/osteomyelitis and possible phlegmon in the appropriate clinical  setting.  2. Multilevel degenerative disc disease  with severe spinal canal stenosis and spinal cord compression at C4-5 and C5-6. Abnormal T2 hyperintense signal within the cervical spinal cord from C3 through C5 compatible with cord edema and/or myelomalacia.  3. Moderate to severe spinal canal stenosis at C3-4.  4. Moderate bilateral neural foraminal narrowing at C3-4, C4-5, and C5-6.      Impression and Plan: Mr. Moises Triplett is a 68 y.o. male with   Cervical spondylitic myelopathy with ambulation difficulties  Patient was explained over the phone about abnormal MRI; he was also explained that he had spondylotic cord compression which can explain his ambulation difficulties with recurrent falls.  Patient needs to be evaluated by neurosurgery asap for surgical decompression.  As neurosurgery consultation is not available at [facility]; patient is being transferred to [facility] now.  As patient is presently being transferred to [facility] now; patient would not be available for me to see him at [place] facility.           This note was partially created using voice recognition software and is inherently subject to errors including those of syntax and \"sound alike\" substitutions which may escape proofreading.  In such instances, original meaning may be extrapolated by contextual derivation.  Errol Mckee MD 1/12/2024 5:17 PM

## 2024-01-12 NOTE — PROGRESS NOTES
Pt missing ebenezer and kenan. Ismael our supervisor talked with their supervisor at [place] and a  will be bringing belongings here

## 2024-01-12 NOTE — PROGRESS NOTES
[facility]  Office: [phone]  Chapin Muse DO, Cyrus Dunn DO, Franky Jules DO, Danny Sexton DO, Luis Angel Gracia MD, Julee Quiñones MD, Delroy Landeros MD, Madison Collier MD,  Tony Rosales MD, Joni Dubois MD, Soila Oliva MD,  Neftali Larkin DO, Alvaro Maria MD, Juan Luis Faustin MD, Torin Muse DO, Eden Barajas MD,  Marquis Lemus DO, Yarely Molina MD, Maya West MD, Karen Adler MD, Shyam Beckman MD,  Rojas Neri MD, Axel Tello MD, Ryan Batista MD, Tima Hoffmann MD, Michael Grimes MD, Angus Hunt MD, Ramiro Burns DO, Bhupinder Mao DO, Mari Villasenor MD,  Davion Graves MD, Shirley Waterhouse, CNP,  Alycia De La Fuente, CNP, Tavon Watkins, CNP,  Katharine Merrill, LACHO, Laura Small, CNP, Brenda Jade, CNP, Malena Rossi CNP, Marya Watson, CNP, Kortney Wilson, CNP, Zabrina Obrien, PA-C, Soila Barber PA-C, Tia Mohan, CNP, Betina Saldivar, CNS, Carmen Worley, CNP, Mamie Crain, CNP, Tracy Schwab, CNP         [facility]   IN-PATIENT SERVICE   [facility]    Progress Note    1/12/2024    3:29 PM    Name:   Moises Triplett  MRN:     [id number]     Acct:      [id number]   Room:   [address] [place] [address] Day:  2  Admit Date:  1/8/2024  1:31 PM    PCP:   No primary care provider on file.  Code Status:  Full Code    Subjective:     Pt seen and examined this morning. He is getting shower by RN staff.  S/p MRI cervical spine yesterday which showed concern for discitis/osteomyelitis and possible phlegmon.  Along with multilevel degenerative disc disease and severe final canal stenosis and spinal cord compression at C4-5 and C5-6.  Spoke to neuro this morning and they are recommending patient to be transferred to [facility] for neurosurgery evaluation.    Medications:     Allergies:    Allergies   Allergen Reactions    Sulfa Antibiotics Rash       Current Meds:   Scheduled Meds:    nicotine  1 patch TransDERmal Daily    carvedilol  3.125 mg Oral  orthopedic surgery, as per Ortho note fracture should heal without any surgical intervention, continue sling for comfort and okay with him coming out of it.  And outpatient follow-up with orthopedic in 6 weeks.     Elevated blood pressure  Patient does not have documented history of hypertension.  His blood pressure was elevated overnight.  Started him on Coreg 3.125 twice daily.  Continue to monitor vitals.  Will adjust Coreg accordingly.    Elevated troponin : No concern for ACS  -The patient is chest-pain free  -S/p echo, suggestive of EF 55-60%.  Normal wall motion.     Dermatitis .  -Rash has resolved,   -Has superficial open wound on left leg, wound ostomy consulted.  Continue wound care.  -S/p prednisone    Discharge planning: Neurosurgery recommended patient to be transferred to [place] for neurosurgery evaluation.  Transfer process started.  Awaiting bed at [facility].      Michael Grimes MD  1/12/2024  3:29 PM

## 2024-01-12 NOTE — PLAN OF CARE
Problem: Discharge Planning  Goal: Discharge to home or other facility with appropriate resources  Outcome: Progressing  Flowsheets (Taken 1/12/2024 0552)  Discharge to home or other facility with appropriate resources:   Identify barriers to discharge with patient and caregiver   Arrange for needed discharge resources and transportation as appropriate   Identify discharge learning needs (meds, wound care, etc)     Problem: Pain  Goal: Verbalizes/displays adequate comfort level or baseline comfort level  Outcome: Progressing  Flowsheets (Taken 1/12/2024 0552)  Verbalizes/displays adequate comfort level or baseline comfort level:   Encourage patient to monitor pain and request assistance   Assess pain using appropriate pain scale   Administer analgesics based on type and severity of pain and evaluate response   Implement non-pharmacological measures as appropriate and evaluate response     Problem: Skin/Tissue Integrity  Goal: Absence of new skin breakdown  Description: 1.  Monitor for areas of redness and/or skin breakdown  2.  Assess vascular access sites hourly  3.  Every 4-6 hours minimum:  Change oxygen saturation probe site  4.  Every 4-6 hours:  If on nasal continuous positive airway pressure, respiratory therapy assess nares and determine need for appliance change or resting period.  Outcome: Progressing     Problem: ABCDS Injury Assessment  Goal: Absence of physical injury  Outcome: Progressing  Flowsheets (Taken 1/12/2024 0552)  Absence of Physical Injury: Implement safety measures based on patient assessment     Problem: Safety - Adult  Goal: Free from fall injury  Outcome: Progressing

## 2024-01-12 NOTE — DISCHARGE SUMMARY
[facility]  Office: [phone]  Chapin Muse DO, Cyrus Dunn DO, Franky Jules DO, Danny Sexton DO, Luis Angel Gracia MD, Julee Quiñones MD, Delroy Landeros MD, Madison Collier MD,  Tony Rosales MD, Joni Dubois MD, Bhupinder Mao DO, Soila Oliva MD,  Neftali Larkin DO, Alvaro Maria MD, Juan Luis Faustin MD, Torin Muse DO, Eden Barajas MD,  Marquis Lemus DO, Yarely Molina MD, Maya West MD, Karen Adler MD, Shyam Beckman MD,  Rojas Neri MD, Axel Tello MD, Ryan Batista MD, Ramiro Burns DO, Mari Villasenor MD,  Davion Graves MD, Shirley Waterhouse, CNP,  Alycia De La Fuente, CNP, Tia Mohan, CNP, Tavon Watkins, CNP,  Katharine Merrill, DNP, Laura Small, CNP, Brenda Jade, CNP, Malena Rossi, CNP, Marya Watson, CNP, Kortney Wilson, CNP, Terence Fonseca PA-C, Betina Saldivar, CNS, Carmen Worley, CNP, Mamie Crain, CNP         [facility]   IN-PATIENT SERVICE   [facility]    Discharge Summary     Patient ID: Moises Triplett  :  1955   MRN: [id number]     ACCOUNT:  [id number]   Patient's PCP: No primary care provider on file.  Admit Date: 2024   Discharge Date: 2024  Length of Stay: 2  Code Status:  Full Code  Admitting Physician: Michael Grimes MD  Discharge Physician: Michael Grimes MD     Active Discharge Diagnoses:     Hospital Problem Lists:  Principal Problem:    Cervical stenosis of spinal canal  Active Problems:    Generalized weakness    Alcohol abuse, uncomplicated    Essential (primary) hypertension    Hyperlipidemia, unspecified    Displaced fracture of acromial process, right shoulder, initial encounter for closed fracture    Multiple falls    Rash and other nonspecific skin eruption  Resolved Problems:    * No resolved hospital problems. *      Admission Condition:  fair     Discharged Condition: fair    Hospital Stay:     Hospital Course:  Moises Triplett is a 68 y.o. male who was admitted for the management of  C6 vertebral bodies. Abnormal enhancing material within the ventral cervical spinal canal extending from C3-4 through C6-7 with abnormal prevertebral soft tissue signal and enhancement extending from C4 through C7.  While findings could be degenerative/inflammatory, these are concerning for concerning for discitis/osteomyelitis and possible phlegmon in the appropriate clinical setting. 2. Multilevel degenerative disc disease with severe spinal canal stenosis and spinal cord compression at C4-5 and C5-6. Abnormal T2 hyperintense signal within the cervical spinal cord from C3 through C5 compatible with cord edema and/or myelomalacia. 3. Moderate to severe spinal canal stenosis at C3-4. 4. Moderate bilateral neural foraminal narrowing at C3-4, C4-5, and C5-6. Findings were discussed with Dr. Alfonso at 5:19 pm on 1/11/2024.     MRI BRAIN W WO CONTRAST    Result Date: 1/9/2024  Chronic microvascular disease without acute intracranial abnormality.     XR SHOULDER RIGHT (MIN 2 VIEWS)    Result Date: 1/8/2024  Right shoulder: 1. Suspected nondisplaced fracture at the junction of the acromion and scapula. 2. Severe right glenohumeral osteoarthrosis. 3. Moderate to severe degenerative changes of the right AC joint. Right knee: 1. Mild degenerative changes of the medial compartment. 2. No acute fracture or dislocation. Left knee: 1. Mild degenerative change of the medial compartment. 2. No acute fracture or dislocation.     XR KNEE LEFT (3 VIEWS)    Result Date: 1/8/2024  Right shoulder: 1. Suspected nondisplaced fracture at the junction of the acromion and scapula. 2. Severe right glenohumeral osteoarthrosis. 3. Moderate to severe degenerative changes of the right AC joint. Right knee: 1. Mild degenerative changes of the medial compartment. 2. No acute fracture or dislocation. Left knee: 1. Mild degenerative change of the medial compartment. 2. No acute fracture or dislocation.     XR KNEE RIGHT (3 VIEWS)    Result Date:  1/8/2024  Right shoulder: 1. Suspected nondisplaced fracture at the junction of the acromion and scapula. 2. Severe right glenohumeral osteoarthrosis. 3. Moderate to severe degenerative changes of the right AC joint. Right knee: 1. Mild degenerative changes of the medial compartment. 2. No acute fracture or dislocation. Left knee: 1. Mild degenerative change of the medial compartment. 2. No acute fracture or dislocation.     XR CHEST PORTABLE    Result Date: 1/8/2024  No acute cardiopulmonary process.     CT CERVICAL SPINE WO CONTRAST    Result Date: 1/8/2024  1. No definite acute osseous abnormality of the cervical spine. 2. Extensive multilevel DJD/DDD changes, slightly increased as compared to 2021 with associated findings, as above.     CT Head W/O Contrast    Result Date: 1/8/2024  No acute intracranial abnormality.       Consultations:    Consults:     Final Specialist Recommendations/Findings:   IP CONSULT TO ORTHOPEDIC SURGERY  IP CONSULT TO NEUROLOGY  IP CONSULT TO NEUROSURGERY  IP CONSULT TO ORTHOPEDIC SURGERY  IP CONSULT TO NEUROSURGERY      The patient was seen and examined on day of discharge and this discharge summary is in conjunction with any daily progress note from day of discharge.    Discharge plan:     Disposition: Discharge/Readmit    Physician Follow Up:   [place] [id number]  [phone]           Requiring Further Evaluation/Follow Up POST HOSPITALIZATION/Incidental Findings: Patient need further evaluation by neurosurgery at [facility].    Diet: regular diet    Activity: As tolerated    Instructions to Patient: Follow-up with neurology, PCP.    Discharge Medications:      Medication List        CONTINUE taking these medications      atorvastatin 10 MG tablet  Commonly known as: LIPITOR     ibuprofen 600 MG tablet  Commonly known as: ADVIL;MOTRIN              Time spent on discharge is 40 mins in patient examination, evaluation, counseling as well

## 2024-01-13 ENCOUNTER — APPOINTMENT (OUTPATIENT)
Dept: GENERAL RADIOLOGY | Age: 69
DRG: 472 | End: 2024-01-13
Attending: INTERNAL MEDICINE
Payer: MEDICARE

## 2024-01-13 PROBLEM — G95.9 CERVICAL MYELOPATHY (HCC): Status: ACTIVE | Noted: 2024-01-13

## 2024-01-13 PROBLEM — Z01.818 PRE-OPERATIVE CLEARANCE: Status: ACTIVE | Noted: 2024-01-13

## 2024-01-13 PROBLEM — S91.002A ANKLE WOUND, LEFT, INITIAL ENCOUNTER: Status: ACTIVE | Noted: 2024-01-13

## 2024-01-13 LAB — VIT B1 PYROPHOSHATE BLD-SCNC: 96 NMOL/L (ref 70–180)

## 2024-01-13 PROCEDURE — 72040 X-RAY EXAM NECK SPINE 2-3 VW: CPT

## 2024-01-13 PROCEDURE — 99222 1ST HOSP IP/OBS MODERATE 55: CPT | Performed by: PHYSICIAN ASSISTANT

## 2024-01-13 PROCEDURE — 99222 1ST HOSP IP/OBS MODERATE 55: CPT | Performed by: INTERNAL MEDICINE

## 2024-01-13 PROCEDURE — 2580000003 HC RX 258: Performed by: STUDENT IN AN ORGANIZED HEALTH CARE EDUCATION/TRAINING PROGRAM

## 2024-01-13 PROCEDURE — 6370000000 HC RX 637 (ALT 250 FOR IP): Performed by: STUDENT IN AN ORGANIZED HEALTH CARE EDUCATION/TRAINING PROGRAM

## 2024-01-13 PROCEDURE — 99222 1ST HOSP IP/OBS MODERATE 55: CPT | Performed by: NEUROLOGICAL SURGERY

## 2024-01-13 PROCEDURE — 1200000000 HC SEMI PRIVATE

## 2024-01-13 RX ADMIN — CARVEDILOL 3.12 MG: 3.12 TABLET, FILM COATED ORAL at 18:03

## 2024-01-13 RX ADMIN — SODIUM CHLORIDE, PRESERVATIVE FREE 10 ML: 5 INJECTION INTRAVENOUS at 08:17

## 2024-01-13 RX ADMIN — ATORVASTATIN CALCIUM 10 MG: 10 TABLET, FILM COATED ORAL at 08:17

## 2024-01-13 RX ADMIN — HYDROCODONE BITARTRATE AND ACETAMINOPHEN 2 TABLET: 5; 325 TABLET ORAL at 20:20

## 2024-01-13 RX ADMIN — CARVEDILOL 3.12 MG: 3.12 TABLET, FILM COATED ORAL at 08:17

## 2024-01-13 RX ADMIN — HYDROCODONE BITARTRATE AND ACETAMINOPHEN 2 TABLET: 5; 325 TABLET ORAL at 06:52

## 2024-01-13 RX ADMIN — SODIUM CHLORIDE, PRESERVATIVE FREE 10 ML: 5 INJECTION INTRAVENOUS at 20:21

## 2024-01-13 ASSESSMENT — PAIN DESCRIPTION - ONSET: ONSET: AWAKENED FROM SLEEP

## 2024-01-13 ASSESSMENT — PAIN SCALES - WONG BAKER
WONGBAKER_NUMERICALRESPONSE: 0
WONGBAKER_NUMERICALRESPONSE: 0
WONGBAKER_NUMERICALRESPONSE: 2
WONGBAKER_NUMERICALRESPONSE: 0
WONGBAKER_NUMERICALRESPONSE: 2
WONGBAKER_NUMERICALRESPONSE: 0
WONGBAKER_NUMERICALRESPONSE: 0

## 2024-01-13 ASSESSMENT — PAIN SCALES - GENERAL
PAINLEVEL_OUTOF10: 0
PAINLEVEL_OUTOF10: 9
PAINLEVEL_OUTOF10: 4
PAINLEVEL_OUTOF10: 9

## 2024-01-13 ASSESSMENT — PAIN DESCRIPTION - FREQUENCY: FREQUENCY: CONTINUOUS

## 2024-01-13 ASSESSMENT — PAIN DESCRIPTION - PAIN TYPE: TYPE: CHRONIC PAIN

## 2024-01-13 ASSESSMENT — PAIN DESCRIPTION - DESCRIPTORS
DESCRIPTORS: DULL;DISCOMFORT;BURNING
DESCRIPTORS: ACHING;SHARP

## 2024-01-13 ASSESSMENT — PAIN DESCRIPTION - LOCATION
LOCATION: BACK;NECK;SHOULDER
LOCATION: NECK;SHOULDER;FINGER (COMMENT WHICH ONE)

## 2024-01-13 ASSESSMENT — PAIN DESCRIPTION - ORIENTATION
ORIENTATION: RIGHT;LEFT
ORIENTATION: RIGHT;LEFT

## 2024-01-13 ASSESSMENT — PAIN - FUNCTIONAL ASSESSMENT: PAIN_FUNCTIONAL_ASSESSMENT: PREVENTS OR INTERFERES SOME ACTIVE ACTIVITIES AND ADLS

## 2024-01-13 NOTE — PLAN OF CARE
Problem: Safety - Adult  Goal: Free from fall injury  Outcome: Progressing     Problem: Discharge Planning  Goal: Discharge to home or other facility with appropriate resources  Outcome: Progressing  Flowsheets (Taken 1/12/2024 1958)  Discharge to home or other facility with appropriate resources: Identify barriers to discharge with patient and caregiver     Problem: Pain  Goal: Verbalizes/displays adequate comfort level or baseline comfort level  Outcome: Progressing  Flowsheets (Taken 1/12/2024 1945)  Verbalizes/displays adequate comfort level or baseline comfort level: Assess pain using appropriate pain scale     Problem: Skin/Tissue Integrity  Goal: Absence of new skin breakdown  Description: 1.  Monitor for areas of redness and/or skin breakdown  2.  Assess vascular access sites hourly  3.  Every 4-6 hours minimum:  Change oxygen saturation probe site  4.  Every 4-6 hours:  If on nasal continuous positive airway pressure, respiratory therapy assess nares and determine need for appliance change or resting period.  Outcome: Progressing     Problem: Skin/Tissue Integrity  Goal: Absence of new skin breakdown  Description: 1.  Monitor for areas of redness and/or skin breakdown  2.  Assess vascular access sites hourly  3.  Every 4-6 hours minimum:  Change oxygen saturation probe site  4.  Every 4-6 hours:  If on nasal continuous positive airway pressure, respiratory therapy assess nares and determine need for appliance change or resting period.  Outcome: Progressing     Problem: ABCDS Injury Assessment  Goal: Absence of physical injury  Outcome: Progressing  Flowsheets (Taken 1/12/2024 2030)  Absence of Physical Injury: Implement safety measures based on patient assessment

## 2024-01-13 NOTE — CONSULTS
[facility] -   Infectious diseases evaluation  admission date 1/12/2024    reason for consultation:   Surg clearance    Impression :   Current:  C spine stenosis w  multiple falls  R ankle past ORIF 5/1/2022 w chronic osteomyelitis  - hardware removed 11/2022  Wound closed 2/15/23  Persistent open ankle wound  Saw Dr Amado [facility]  Bacteriae was MRSA, w a superficial pseudomonas that did not need to be treated,  - keflex x few weeks - stopped  Back w cellulitis [facility],  I/D 6/16/23 / daptomycin 4 weeks for MRSA Dr Amado - then AB done  Then c diff 6/30/23  Still has a left ankle open wound as post op - no AB needed  Now has 1 screw  in place on the other side of the infection, and no plate- has been off AB x 7/2023  Allergy to sulfa    Other:  Smoker - stopped smoking 6/21/23  Discussion / summary of stay / plan of care     Recommendations   His should not have deep osteomyelitis remaining  in the ankle   Wound taking a long while to close - it is now 50% smaller since 7/2023 according to the pt  I believe the risk of cross infection is mild to moderate   In order drop that risk I suggest  Keep the ankle dressed and cleaned  Pt to [facility] shower before the surgery.  C wound  should be clean and dressed at all time till the c spine wound closes.  Will disc w Dr Perales  Case disc w Dr Amado and pt    Infection Control Recommendations   [place] Precautions      Antimicrobial Stewardship Recommendations   Off AB    History of Present Illness:   Initial history:  Moises Triplett is a 68 y.o.-year-old male fall at home abd could not get up-\increasing falls recently -  CT neck suggested diffuse degenrative disease w significant stenosis C3 - C6  NS eval for possible surgery    Hx of ankle fracture and plating 2022 w long term AB for osteomyelitis      ID called for clearance for the C procedure considering his open wound of the foot            Interval changes  1/13/2024   BP (!)    Neurological:      Mental Status: He is alert.      Cranial Nerves: No cranial nerve deficit.   Psychiatric:         Mood and Affect: Mood normal.         Past Medical History:   No past medical history on file.    Past Surgical  History:   No past surgical history on file.    Medications:      atorvastatin  10 mg Oral Daily    sodium chloride flush  5-40 mL IntraVENous 2 times per day    enoxaparin  40 mg SubCUTAneous Daily    nicotine  1 patch TransDERmal Daily    carvedilol  3.125 mg Oral BID WC       Social History:     Social History     Socioeconomic History    Marital status: Single     Spouse name: Not on file    Number of children: Not on file    Years of education: Not on file    Highest education level: Not on file   Occupational History    Not on file   Tobacco Use    Smoking status: Former     Types: Cigarettes    Smokeless tobacco: Never   Substance and Sexual Activity    Alcohol use: Yes     Comment: beer a day    Drug use: Yes     Frequency: 7.0 times per week     Types: Marijuana (Weed)    Sexual activity: Not on file   Other Topics Concern    Not on file   Social History Narrative    Not on file     Social Determinants of Health     Financial Resource Strain: Not on file   Food Insecurity: No Food Insecurity (1/8/2024)    Hunger Vital Sign     Worried About Running Out of Food in the Last Year: Never true     Ran Out of Food in the Last Year: Never true   Transportation Needs: No Transportation Needs (1/8/2024)    PRAPARE - Transportation     Lack of Transportation (Medical): No     Lack of Transportation (Non-Medical): No   Physical Activity: Not on file   Stress: Not on file   Social Connections: Not on file   Intimate Partner Violence: Not on file   Housing Stability: Low Risk  (1/8/2024)    Housing Stability Vital Sign     Unable to Pay for Housing in the Last Year: No     Number of Places Lived in the Last Year: 1     Unstable Housing in the Last Year: No       Family History:   No family

## 2024-01-13 NOTE — CARE COORDINATION
Case Management Assessment  Initial Evaluation    Date/Time of Evaluation: 1/13/2024 4:20 PM  Assessment Completed by: Sarah Castaneda    If patient is discharged prior to next notation, then this note serves as note for discharge by case management.    Patient Name: Moises Triplett                   YOB: 1955  Diagnosis: Cervical stenosis of spinal canal [M48.02]                   Date / Time: 1/12/2024  5:47 PM    Patient Admission Status: Inpatient   Readmission Risk (Low < 19, Mod (19-27), High > 27): Readmission Risk Score: 11.5    Current PCP: No primary care provider on file.  PCP verified by CM? (P) Yes (Dr Amparo [facility])    Chart Reviewed: Yes      History Provided by: (P) Patient, Friend  Patient Orientation: (P) Alert and Oriented, Person, Place, Situation, Self    Patient Cognition: (P) Alert    Hospitalization in the last 30 days (Readmission):  No    If yes, Readmission Assessment in CM Navigator will be completed.    Advance Directives:      Code Status: Full Code   Patient's Primary Decision Maker is: (P) Legal Next of Kin      Discharge Planning:    Patient lives with: (P) Alone Type of Home: (P) Apartment  Primary Care Giver: (P) Self  Patient Support Systems include: (P) Family Members, Friends/Neighbors, Home Care Staff   Current Financial resources: (P) Medicaid, Medicare  Current community resources: (P) ECF/Home Care  Current services prior to admission: None            Current DME:              Type of Home Care services:  (P) None    ADLS  Prior functional level: (P) Independent in ADLs/IADLs  Current functional level: (P) Assistance with the following:, Bathing, Dressing, Toileting, Cooking, Housework, Shopping, Mobility (has assist of apartment neighbors)    PT AM-PAC:   /24  OT AM-PAC:   /24    Family can provide assistance at DC: (P) No  Would you like Case Management to discuss the discharge plan with any other family members/significant others,

## 2024-01-13 NOTE — CONSULTS
Department of Neurosurgery                                            Nurse Practitioner Consult Note      Reason for Consult:  severe cervical spinal stenosis  Requesting Physician:  Dr Mckee  Neurosurgeon:   [] Dr. Cook  [x] Dr. Perales  [] Dr. Wick  [] Dr. Klein      History Obtained From:  patient, family member - Brother/sister , electronic medical record    CHIEF COMPLAINT:         Frequent falls, cervical stenosis      HISTORY OF PRESENT ILLNESS:       The patient is a 68 y.o. male with significant past medical history of hyperlipidemia who presented to outside hospital after he had a fall at home and was stuck between the sink and the toilet.  Patient reports that he has had increasing falls since he has returned home from rehab.  Patient wanted evaluation and admission to an acute rehab.  Workup at the outside hospital patient was found to have a nondisplaced fracture of the junction of the acromion and scapula.  CT of the cervical spine and head were obtained, cervical spine demonstrate extensive multilevel degenerative disc disease.  The setting of increasing frequent falls and MRI of the cervical spine was obtained that demonstrated significant stenosis from C3-C6 with signal cord change C3-6.  Patient was transferred to [facility] for evaluation of neurosurgical intervention.   Note patient had ankle fracture with plating in 2022 which developed osteomyelitis and he was on longtime antibiotics was recently stopped in October 23  On exam patient is alert and oriented, right upper extremity exam limited secondary to shoulder fracture.  Left upper extremity 4 out of 5 exception of 3 out of 5 intrinsics.  Positive Mary's.  DTRs brisk.  Negative Lhermitte's.     PAST MEDICAL HISTORY :       Past Medical History:    No past medical history on file.    Past Surgical History:    No past surgical history on file.    Social History:   Social History     Socioeconomic History     canal  extending from C3-4 through C6-7 with abnormal prevertebral soft tissue  signal and enhancement extending from C4 through C7.  While findings could be  degenerative/inflammatory, these are concerning for concerning for  discitis/osteomyelitis and possible phlegmon in the appropriate clinical  setting.  2. Multilevel degenerative disc disease with severe spinal canal stenosis and  spinal cord compression at C4-5 and C5-6. Abnormal T2 hyperintense signal  within the cervical spinal cord from C3 through C5 compatible with cord edema  and/or myelomalacia.  3. Moderate to severe spinal canal stenosis at C3-4.  4. Moderate bilateral neural foraminal narrowing at C3-4, C4-5, and C5-6.    ASSESSMENT AND PLAN:       Patient Active Problem List   Diagnosis    Generalized weakness    Alcohol abuse, uncomplicated    Essential (primary) hypertension    Hyperlipidemia, unspecified    Displaced fracture of acromial process, right shoulder, initial encounter for closed fracture    Multiple falls    Rash and other nonspecific skin eruption    Cervical stenosis of spinal canal         A/P:  This is a 68 y.o. male with increasing in falls found to have severe cervical stenosis with cord signal change.  Patient care will be discussed with attending, will reevaluated patient along with attending.      - Neurosurgical intervention pending medical clearance, cardiac clearance, cervical flexion-extension and once reviewed by attending neurosurgeon   - CTLS recommendations: CT cervical spine (obtained 1/8/2024), cervical flexion-extension  -Obtain medical and cardiac clearance for OR, primary team notified               - Neuro checks per protocol  - Hold all antiplatelets and anticoagulants  - Ok to begin prophylactic anticoagulation from neurosurgery stand point. However, we recommend careful evaluation of all other risk factors associated with anticoagulation therapy as applied to this patient's medical condition      Additional  recommendations may follow    Please contact neurosurgery with any changes in patients neurologic status.     Thank you for your consult.       BRITTA Moncada - [facility]     Neuroscience [place]   1/12/2024  10:13 PM

## 2024-01-13 NOTE — PROGRESS NOTES
Radiology called for scan to be completed. Patient refused scans at this time patient stated he is tired for the night and would like to try to get some rest and wuill retry in the am. Radiology decided they will send transport in the am to retry scans.

## 2024-01-13 NOTE — H&P
[facility]  Office: [phone]  Chapin Muse DO, Cyrus Dunn DO, Franky Jules DO, Danny Sexton DO, Luis Angel Gracia MD, Julee Quiñones MD, Delroy Landeros MD, Madison Collier MD,  Tony Rosales MD, Joni Dubois MD, Soila Oliva MD,  Neftali Larkin DO, Alvaro Maria MD, Juan Luis Faustin MD, Torin Muse DO, Eden Barajas MD,  Marquis Lemus DO, Yarely Molina MD, Maya West MD, Karen Adler MD, Shyam Beckman MD,  Rojas Neri MD, Axel Tello MD, Ryan Batista MD, Tima Hoffmann MD, Michael Grimes MD, Angus Hunt MD, Ramiro Burns DO, Bhupinder Mao DO, Mari Villasenor MD,  Davion Graves MD, Shirley Waterhouse, CNP,  Alycia De La Fuente CNP, Tavon Watkins, CNP,  Katharine Merrill, LACHO, Laura Small, CNP, Brenda Jade, CNP, Malnea Rossi CNP, Marya Watson CNP, Kortney Wilson, CNP, Zabrina Obrien, PA-C, Soila Barber PA-C, Tia Mohan, CNP, Betina Saldivar, CNS, Carmen Worley, CNP, Mamie Crain CNP, Tracy Schwab, CNP         [facility]   IN-PATIENT SERVICE   [facility]    HISTORY AND PHYSICAL EXAMINATION            Date:   1/13/2024  Patient name:  Moises Triplett  Date of admission:  1/12/2024  5:47 PM  MRN:   [id number]  Account:  [id number]  YOB: 1955  PCP:    No primary care provider on file.  Room:   [id number]  Code Status:    Full Code    Chief Complaint:     Frequent falls      History Obtained From:     patient    History of Present Illness:     Moises Triplett is a 68 y.o. Non- / non  male who initially presented to [place] ED 1/8/24 for frequent falls. Patient has a medical history significant for hyperlipidemia and osteoarthritis.     Patient states he has had a tough time moving around and maintaining his balance. He reports this has been ongoing for months. He presented to [place] ED after several falls.    Patient was admitted to [place] due to frequent falls and need for ARU placement.  tone and bulk, no abnormal sensation, normal speech, cranial nerves II through XII grossly intact  Skin: No gross lesions, rashes, bruising or bleeding on exposed skin area  Extremities: peripheral pulses palpable, no pedal edema or calf pain with palpation  Psych: normal affect    Investigations:      Laboratory Testing:  Recent Results (from the past 24 hour(s))   C-Reactive Protein    Collection Time: 01/12/24  2:38 PM   Result Value Ref Range    CRP 3.4 0.0 - 5.0 mg/L   Sedimentation Rate    Collection Time: 01/12/24  2:38 PM   Result Value Ref Range    Sed Rate 8 0 - 20 mm/Hr       Imaging/Diagnostics:  MRI CERVICAL SPINE W WO CONTRAST    Result Date: 1/11/2024  1. Abnormal signal and enhancement involving the C3 through C6 vertebral bodies. Abnormal enhancing material within the ventral cervical spinal canal extending from C3-4 through C6-7 with abnormal prevertebral soft tissue signal and enhancement extending from C4 through C7.  While findings could be degenerative/inflammatory, these are concerning for concerning for discitis/osteomyelitis and possible phlegmon in the appropriate clinical setting. 2. Multilevel degenerative disc disease with severe spinal canal stenosis and spinal cord compression at C4-5 and C5-6. Abnormal T2 hyperintense signal within the cervical spinal cord from C3 through C5 compatible with cord edema and/or myelomalacia. 3. Moderate to severe spinal canal stenosis at C3-4. 4. Moderate bilateral neural foraminal narrowing at C3-4, C4-5, and C5-6. Findings were discussed with Dr. Alfonso at 5:19 pm on 1/11/2024.     MRI BRAIN W WO CONTRAST    Result Date: 1/9/2024  Chronic microvascular disease without acute intracranial abnormality.     XR SHOULDER RIGHT (MIN 2 VIEWS)    Result Date: 1/8/2024  Right shoulder: 1. Suspected nondisplaced fracture at the junction of the acromion and scapula. 2. Severe right glenohumeral osteoarthrosis. 3. Moderate to severe degenerative changes of the right

## 2024-01-14 ENCOUNTER — APPOINTMENT (OUTPATIENT)
Dept: GENERAL RADIOLOGY | Age: 69
DRG: 472 | End: 2024-01-14
Attending: INTERNAL MEDICINE
Payer: MEDICARE

## 2024-01-14 LAB
ANION GAP SERPL CALCULATED.3IONS-SCNC: 8 MMOL/L (ref 9–17)
BUN SERPL-MCNC: 17 MG/DL (ref 8–23)
CALCIUM SERPL-MCNC: 9 MG/DL (ref 8.6–10.4)
CHLORIDE SERPL-SCNC: 98 MMOL/L (ref 98–107)
CO2 SERPL-SCNC: 27 MMOL/L (ref 20–31)
CREAT SERPL-MCNC: 0.5 MG/DL (ref 0.7–1.2)
ERYTHROCYTE [DISTWIDTH] IN BLOOD BY AUTOMATED COUNT: 19.2 % (ref 11.8–14.4)
GFR SERPL CREATININE-BSD FRML MDRD: >60 ML/MIN/1.73M2
GLUCOSE SERPL-MCNC: 89 MG/DL (ref 70–99)
HCT VFR BLD AUTO: 39.8 % (ref 40.7–50.3)
HGB BLD-MCNC: 12.9 G/DL (ref 13–17)
MCH RBC QN AUTO: 26.1 PG (ref 25.2–33.5)
MCHC RBC AUTO-ENTMCNC: 32.4 G/DL (ref 28.4–34.8)
MCV RBC AUTO: 80.6 FL (ref 82.6–102.9)
NRBC BLD-RTO: 0 PER 100 WBC
PLATELET # BLD AUTO: 475 K/UL (ref 138–453)
PMV BLD AUTO: 8.5 FL (ref 8.1–13.5)
POTASSIUM SERPL-SCNC: 4.8 MMOL/L (ref 3.7–5.3)
RBC # BLD AUTO: 4.94 M/UL (ref 4.21–5.77)
SODIUM SERPL-SCNC: 133 MMOL/L (ref 135–144)
WBC OTHER # BLD: 6 K/UL (ref 3.5–11.3)

## 2024-01-14 PROCEDURE — 99222 1ST HOSP IP/OBS MODERATE 55: CPT | Performed by: INTERNAL MEDICINE

## 2024-01-14 PROCEDURE — 6370000000 HC RX 637 (ALT 250 FOR IP): Performed by: INTERNAL MEDICINE

## 2024-01-14 PROCEDURE — 99232 SBSQ HOSP IP/OBS MODERATE 35: CPT | Performed by: INTERNAL MEDICINE

## 2024-01-14 PROCEDURE — 36415 COLL VENOUS BLD VENIPUNCTURE: CPT

## 2024-01-14 PROCEDURE — APPSS15 APP SPLIT SHARED TIME 0-15 MINUTES: Performed by: NURSE PRACTITIONER

## 2024-01-14 PROCEDURE — 2580000003 HC RX 258: Performed by: STUDENT IN AN ORGANIZED HEALTH CARE EDUCATION/TRAINING PROGRAM

## 2024-01-14 PROCEDURE — 80048 BASIC METABOLIC PNL TOTAL CA: CPT

## 2024-01-14 PROCEDURE — 6370000000 HC RX 637 (ALT 250 FOR IP): Performed by: STUDENT IN AN ORGANIZED HEALTH CARE EDUCATION/TRAINING PROGRAM

## 2024-01-14 PROCEDURE — 6360000002 HC RX W HCPCS: Performed by: STUDENT IN AN ORGANIZED HEALTH CARE EDUCATION/TRAINING PROGRAM

## 2024-01-14 PROCEDURE — 72040 X-RAY EXAM NECK SPINE 2-3 VW: CPT

## 2024-01-14 PROCEDURE — 1200000000 HC SEMI PRIVATE

## 2024-01-14 PROCEDURE — 85027 COMPLETE CBC AUTOMATED: CPT

## 2024-01-14 RX ORDER — CHLORHEXIDINE GLUCONATE 4 G/100ML
SOLUTION TOPICAL DAILY
Status: COMPLETED | OUTPATIENT
Start: 2024-01-14 | End: 2024-01-16

## 2024-01-14 RX ADMIN — ATORVASTATIN CALCIUM 10 MG: 10 TABLET, FILM COATED ORAL at 08:20

## 2024-01-14 RX ADMIN — CHLORHEXIDINE GLUCONATE: 213 SOLUTION TOPICAL at 21:48

## 2024-01-14 RX ADMIN — HYDROCODONE BITARTRATE AND ACETAMINOPHEN 2 TABLET: 5; 325 TABLET ORAL at 10:47

## 2024-01-14 RX ADMIN — HYDROCODONE BITARTRATE AND ACETAMINOPHEN 2 TABLET: 5; 325 TABLET ORAL at 17:04

## 2024-01-14 RX ADMIN — MUPIROCIN: 20 OINTMENT TOPICAL at 21:49

## 2024-01-14 RX ADMIN — ENOXAPARIN SODIUM 40 MG: 100 INJECTION SUBCUTANEOUS at 08:20

## 2024-01-14 RX ADMIN — CARVEDILOL 3.12 MG: 3.12 TABLET, FILM COATED ORAL at 08:20

## 2024-01-14 RX ADMIN — SODIUM CHLORIDE, PRESERVATIVE FREE 10 ML: 5 INJECTION INTRAVENOUS at 08:20

## 2024-01-14 RX ADMIN — SODIUM CHLORIDE, PRESERVATIVE FREE 10 ML: 5 INJECTION INTRAVENOUS at 22:44

## 2024-01-14 RX ADMIN — HYDROCODONE BITARTRATE AND ACETAMINOPHEN 2 TABLET: 5; 325 TABLET ORAL at 03:07

## 2024-01-14 RX ADMIN — CARVEDILOL 3.12 MG: 3.12 TABLET, FILM COATED ORAL at 17:04

## 2024-01-14 ASSESSMENT — PAIN SCALES - GENERAL
PAINLEVEL_OUTOF10: 7
PAINLEVEL_OUTOF10: 8
PAINLEVEL_OUTOF10: 6
PAINLEVEL_OUTOF10: 8
PAINLEVEL_OUTOF10: 7
PAINLEVEL_OUTOF10: 6
PAINLEVEL_OUTOF10: 6

## 2024-01-14 ASSESSMENT — PAIN DESCRIPTION - LOCATION
LOCATION: NECK;SHOULDER
LOCATION: NECK;SHOULDER

## 2024-01-14 ASSESSMENT — PAIN DESCRIPTION - DESCRIPTORS
DESCRIPTORS: ACHING
DESCRIPTORS: ACHING

## 2024-01-14 ASSESSMENT — PAIN DESCRIPTION - PAIN TYPE: TYPE: CHRONIC PAIN

## 2024-01-14 ASSESSMENT — PAIN DESCRIPTION - FREQUENCY: FREQUENCY: CONTINUOUS

## 2024-01-14 ASSESSMENT — PAIN DESCRIPTION - ORIENTATION
ORIENTATION: RIGHT
ORIENTATION: RIGHT;LEFT

## 2024-01-14 NOTE — PROGRESS NOTES
[facility]  Office: [phone]  Chapin Muse DO, Cyrus Dunn DO, Franky Jules DO, Danny Sexton DO, Luis Angel Gracia MD, Julee Quiñones MD, Delroy Landeros MD, Madison Collier MD,  Tony Rosales MD, Joni Dubois MD, Soila Oliva MD,  Neftali Larkni DO, Alvaro Maria MD, Juan Luis Faustin MD, Torin Muse DO, Eden Barajas MD,  Marquis Lemus DO, Yarely Molina MD, Maya West MD, Karen Adler MD, Shyam Beckman MD,  Rojas Neri MD, Axel Tello MD, Ryan Batista MD, Tima Hoffmann MD, Michael Grimes MD, Angus Hunt MD, Ramiro Burns DO, Bhupinder Mao DO, Mari Villasenor MD,  Davion Graves MD, Shirley Waterhouse, CNP,  Alycia De La Fuente CNP, Tavon Watkins, CNP,  Katharine Merrill, LACHO, Laura Small, CNP, Brenda Jade, CNP, Malena Rossi CNP, Marya Watson, CNP, Kortney Wilson, CNP, Zabrina Obrien, PA-C, Soila Barber, PA-C, Tia Mohan, CNP, Betina Saldivar, CNS, Carmen Worley, CNP, Mamie Crain, CNP, Tracy Schwab, CNP         [facility]   IN-PATIENT SERVICE   [facility]    Progress Note    1/14/2024    11:46 AM    Name:   Moises Triplett  MRN:     [id number]     Acct:      [id number]   Room:   [facility]/0246-01  IP Day:  2  Admit Date:  1/12/2024  5:47 PM    PCP:   Amparo Bradford APRN - NP  Code Status:  Full Code    Subjective:     C/C: fall     Interval History Status: not changed.     No issues overnight  NS planning possible intervention  Cardiology consulted for clearance  No other complaints     Brief History:     Moises Triplett is a 68 y.o. Non- / non  male who initially presented to [place] ED 1/8/24 for frequent falls. Patient has a medical history significant for hyperlipidemia and osteoarthritis.      Patient states he has had a tough time moving around and maintaining his balance. He reports this has been ongoing for months. He presented to [place] ED after several falls.     Patient was admitted to [place]

## 2024-01-14 NOTE — PROGRESS NOTES
[facility] -   Infectious diseases evaluation  admission date 1/12/2024    reason for consultation:   Surg clearance    Impression :   Current:  C spine stenosis w  multiple falls  R ankle past ORIF 5/1/2022 w chronic osteomyelitis  - hardware removed 11/2022  Wound closed 2/15/23  Persistent open ankle wound  Saw Dr Amado [facility]  Bacteriae was MRSA S doxy, w a superficial pseudomonas that did not need to be treated,  - keflex x few weeks - stopped  Back w cellulitis [facility],  I/D 6/16/23 / daptomycin 4 weeks for MRSA Dr Amado - then AB done  Then c diff 6/30/23  Still has a left ankle open wound as post op - no AB needed  Now has 1 screw  in place on the other side of the infection, and no plate- has been off AB x 7/2023  Allergy to sulfa    Other:  Smoker - stopped smoking 6/21/23  Discussion / summary of stay / plan of care     Recommendations   His should not have deep osteomyelitis remaining  in the ankle   Wound taking a long while to close - it is now 50% smaller since 7/2023 according to the pt  I believe the risk of cross infection is moderate   In order drop that risk I suggest  Keep the ankle dressed and cleaned  Pt to [facility] shower before the surgery. Daily x 3 days start 1/14  C wound  should be clean and dressed at all time till the c spine wound closes.  Mupirocin daily nostrils and axillae x 14 days  disc w Dr Perales - he likes to perform surgery 1/16  Case disc w Dr Amado and pt      Pt worried about his teeth- lots of plaque and no abscess suspected at this time - suggested 2 x per year cleaning and good regular teeth care    Infection Control Recommendations   [place] Precautions      Antimicrobial Stewardship Recommendations   Off AB    History of Present Illness:   Initial history:  Moises Triplett is a 68 y.o.-year-old male fall at home abd could not get up-\increasing falls recently -  CT neck suggested diffuse degenrative disease w significant        Detailed results:        Thank you for allowing us to participate in the care of this patient.Please call with questions.    This note is created with the assistance of a speech recognition program.  While intending to generate adocument that actually reflects the content of the visit, the document can still have some errors including those of syntax and sound a like substitutions which may escape proof reading.  It such instances, actual meaningcan be extrapolated by contextual diversion.    Desmond Barreto MD  Family Medicine Resident, PGY-2   01/14/24            I have discussed the care of the patient, including pertinent history and exam findings,  with the resident., student or CNP- I have seen and examined the patient and the key elements of all parts of the encounter have been performed by me.  I agree with the assessment, plan and orders as documented by the resident.student or CNP    Ilene Heard, Infectious Diseases

## 2024-01-14 NOTE — PROGRESS NOTES
Neurosurgery [facility]/Resident    Daily Progress Note   CC:No chief complaint on file.    1/14/2024  7:06 AM    Chart reviewed.  No acute events overnight.  No new complaints. Resting in bed, right arm in sling, tolerating diet, ID consulted yesterday for Left ankle open wound     Vitals:    01/13/24 0820 01/13/24 2015 01/13/24 2050 01/14/24 0337   BP: (!) 145/96 (!) 150/97     Pulse: 67 73     Resp:  18 18 18   Temp:  97.6 °F (36.4 °C)     TempSrc:  Oral     SpO2: 96% 97%     Weight:       Height:           PE:     AOx3   Motor   L deltoid 5/5; R deltoid 5/5  L biceps 5/5; R biceps 5/5  L triceps 5/5; R triceps 5/5  L wrist extension 5/5; R wrist extension 5/5  Weakness intrinsics  Atrophy of interosseous  muscles   Spastic BLE - chronic per patient      L iliopsoas 5/5 , R iliopsoas 5/5  L quadriceps 5/5; R quadriceps 5/5  L Dorsiflexion 5/5; R dorsiflexion 5/5  L Plantarflexion 5/5; R plantarflexion 5/5  L EHL 5/5; R EHL 5/5    Drift:  absent  Tone:  normal    Sensation: intact         Lab Results   Component Value Date    WBC 5.2 01/09/2024    HGB 12.1 (L) 01/09/2024    HCT 35.9 (L) 01/09/2024     (H) 01/09/2024    ALT 29 01/08/2024    AST 39 01/08/2024     (L) 01/09/2024    K 4.0 01/09/2024    CL 99 01/09/2024    CREATININE 0.6 (L) 01/09/2024    BUN 26 (H) 01/09/2024    CO2 24 01/09/2024    INR 0.97 06/12/2023    CRP 3.4 01/12/2024    SEDRATE 8 01/12/2024         A/P  68 y.o. male who presents with frequent falls, cervical stenosis with cord compression     ID following for left ankle open wound   Possible neurosurgical intervention inpatient vs outpatient   Ok for PT and OT   Hibiclens bath daily and keep left ankle wrapped and dry during shower   Planning on surgery likely Tuesday       Please contact neurosurgery with any changes in patients neurologic status.       Yogesh Grant CNP  1/14/24  7:06 AM

## 2024-01-14 NOTE — CONSULTS
[facility]   Consult Note                 Date:   1/14/2024  Patient name: Moises Triplett  Date of admission:  1/12/2024  5:47 PM  MRN:   [id number]  YOB: 1955    Reason for Consult:  cardiac evaluation    CHIEF COMPLAINT:  Fall    History Obtained From:  Patient     HISTORY OF PRESENT ILLNESS:    The patient is a 68 y.o. male  presenting to the hospital due to a fall. The patient was evaluate in the ED. He reported loss of balance and frequent falls on going for the past few months. The patient was noted to have right acromion fx. MRI imaging showed signs of severe canal stenosis. Neurosurgery consulted on the case. Cardiology consulted for preoperative risk stratification.       Past Medical History:   has no past medical history on file.    Past Surgical History:   has no past surgical history on file.     Home Medications:    Prior to Admission medications    Medication Sig Start Date End Date Taking? Authorizing Provider   ibuprofen (ADVIL;MOTRIN) 600 MG tablet Take 1 tablet by mouth every 6 hours as needed for Pain 1/4/24   ProviderTom MD   atorvastatin (LIPITOR) 10 MG tablet Take 1 tablet by mouth at bedtime 12/30/23   Provider, MD Tom       Allergies:  Latex and Sulfa antibiotics    Social History:   reports that he has quit smoking. His smoking use included cigarettes. He has never used smokeless tobacco. He reports current alcohol use. He reports current drug use. Frequency: 7.00 times per week. Drug: Marijuana (Weed).     Family History: family history is not on file. No for premature CAD. No for h/o sudden cardiac death    REVIEW OF SYSTEMS:    Constitutional: there has been no unanticipated weight loss. There's been No change in activity level.     Eyes: No visual changes or diplopia. No scleral icterus.  ENT: No Headaches, hearing loss or vertigo.   Cardiovascular: no chest pain, no dyspnea on exertion, no palpitations or No loss of consciousness.

## 2024-01-15 LAB
ANION GAP SERPL CALCULATED.3IONS-SCNC: 9 MMOL/L (ref 9–17)
BUN SERPL-MCNC: 15 MG/DL (ref 8–23)
CALCIUM SERPL-MCNC: 8.9 MG/DL (ref 8.6–10.4)
CHLORIDE SERPL-SCNC: 100 MMOL/L (ref 98–107)
CO2 SERPL-SCNC: 26 MMOL/L (ref 20–31)
CREAT SERPL-MCNC: 0.5 MG/DL (ref 0.7–1.2)
ERYTHROCYTE [DISTWIDTH] IN BLOOD BY AUTOMATED COUNT: 19.2 % (ref 11.8–14.4)
GFR SERPL CREATININE-BSD FRML MDRD: >60 ML/MIN/1.73M2
GLUCOSE SERPL-MCNC: 84 MG/DL (ref 70–99)
HCT VFR BLD AUTO: 40.5 % (ref 40.7–50.3)
HGB BLD-MCNC: 12.7 G/DL (ref 13–17)
MCH RBC QN AUTO: 25.5 PG (ref 25.2–33.5)
MCHC RBC AUTO-ENTMCNC: 31.4 G/DL (ref 28.4–34.8)
MCV RBC AUTO: 81.3 FL (ref 82.6–102.9)
NRBC BLD-RTO: 0 PER 100 WBC
PLATELET # BLD AUTO: 500 K/UL (ref 138–453)
PMV BLD AUTO: 8.6 FL (ref 8.1–13.5)
POTASSIUM SERPL-SCNC: 4.1 MMOL/L (ref 3.7–5.3)
RBC # BLD AUTO: 4.98 M/UL (ref 4.21–5.77)
SODIUM SERPL-SCNC: 135 MMOL/L (ref 135–144)
WBC OTHER # BLD: 6.1 K/UL (ref 3.5–11.3)

## 2024-01-15 PROCEDURE — 2580000003 HC RX 258: Performed by: STUDENT IN AN ORGANIZED HEALTH CARE EDUCATION/TRAINING PROGRAM

## 2024-01-15 PROCEDURE — 36415 COLL VENOUS BLD VENIPUNCTURE: CPT

## 2024-01-15 PROCEDURE — 85027 COMPLETE CBC AUTOMATED: CPT

## 2024-01-15 PROCEDURE — 6370000000 HC RX 637 (ALT 250 FOR IP): Performed by: STUDENT IN AN ORGANIZED HEALTH CARE EDUCATION/TRAINING PROGRAM

## 2024-01-15 PROCEDURE — 1200000000 HC SEMI PRIVATE

## 2024-01-15 PROCEDURE — 6370000000 HC RX 637 (ALT 250 FOR IP): Performed by: INTERNAL MEDICINE

## 2024-01-15 PROCEDURE — 99213 OFFICE O/P EST LOW 20 MIN: CPT

## 2024-01-15 PROCEDURE — 6360000002 HC RX W HCPCS: Performed by: STUDENT IN AN ORGANIZED HEALTH CARE EDUCATION/TRAINING PROGRAM

## 2024-01-15 PROCEDURE — 99232 SBSQ HOSP IP/OBS MODERATE 35: CPT | Performed by: NURSE PRACTITIONER

## 2024-01-15 PROCEDURE — 99232 SBSQ HOSP IP/OBS MODERATE 35: CPT | Performed by: INTERNAL MEDICINE

## 2024-01-15 PROCEDURE — 80048 BASIC METABOLIC PNL TOTAL CA: CPT

## 2024-01-15 RX ADMIN — HYDROCODONE BITARTRATE AND ACETAMINOPHEN 2 TABLET: 5; 325 TABLET ORAL at 09:16

## 2024-01-15 RX ADMIN — MUPIROCIN: 20 OINTMENT TOPICAL at 09:14

## 2024-01-15 RX ADMIN — ENOXAPARIN SODIUM 40 MG: 100 INJECTION SUBCUTANEOUS at 09:12

## 2024-01-15 RX ADMIN — HYDROCODONE BITARTRATE AND ACETAMINOPHEN 2 TABLET: 5; 325 TABLET ORAL at 20:33

## 2024-01-15 RX ADMIN — SODIUM CHLORIDE, PRESERVATIVE FREE 10 ML: 5 INJECTION INTRAVENOUS at 09:12

## 2024-01-15 RX ADMIN — SODIUM CHLORIDE, PRESERVATIVE FREE 10 ML: 5 INJECTION INTRAVENOUS at 20:33

## 2024-01-15 RX ADMIN — CHLORHEXIDINE GLUCONATE: 213 SOLUTION TOPICAL at 22:36

## 2024-01-15 RX ADMIN — ATORVASTATIN CALCIUM 10 MG: 10 TABLET, FILM COATED ORAL at 09:12

## 2024-01-15 RX ADMIN — CARVEDILOL 3.12 MG: 3.12 TABLET, FILM COATED ORAL at 18:06

## 2024-01-15 RX ADMIN — MUPIROCIN: 20 OINTMENT TOPICAL at 22:36

## 2024-01-15 RX ADMIN — HYDROCODONE BITARTRATE AND ACETAMINOPHEN 2 TABLET: 5; 325 TABLET ORAL at 00:04

## 2024-01-15 RX ADMIN — CARVEDILOL 3.12 MG: 3.12 TABLET, FILM COATED ORAL at 09:12

## 2024-01-15 ASSESSMENT — PAIN SCALES - GENERAL
PAINLEVEL_OUTOF10: 8
PAINLEVEL_OUTOF10: 9
PAINLEVEL_OUTOF10: 9

## 2024-01-15 ASSESSMENT — PAIN DESCRIPTION - DESCRIPTORS: DESCRIPTORS: ACHING

## 2024-01-15 ASSESSMENT — PAIN DESCRIPTION - ORIENTATION: ORIENTATION: RIGHT

## 2024-01-15 ASSESSMENT — PAIN DESCRIPTION - LOCATION: LOCATION: NECK;SHOULDER

## 2024-01-15 NOTE — PROGRESS NOTES
Neurosurgery [facility]/Resident    Daily Progress Note   No chief complaint on file.    1/15/2024  10:23 AM    Chart reviewed.  No acute events overnight.  Resting in bed, right arm in sling, tolerating diet. Patient states his only complaint is constipation.     Vitals:    01/14/24 1117 01/14/24 1734 01/14/24 1923 01/15/24 0721   BP:   130/86 (!) 137/96   Pulse:   74 65   Resp: 16 16 17    Temp:   97.7 °F (36.5 °C) 97.3 °F (36.3 °C)   TempSrc:   Oral Oral   SpO2:   98% 98%   Weight:       Height:             PE:   AOx3     Motor   L deltoid 5/5; R deltoid 5/5  L biceps 5/5; R biceps 5/5  L triceps 5/5; R triceps 5/5  L wrist extension 5/5; R wrist extension 5/5     L iliopsoas 5/5 , R iliopsoas 5/5  L quadriceps 5/5; R quadriceps 5/5  L Dorsiflexion 5/5; R dorsiflexion 5/5  L Plantarflexion 5/5; R plantarflexion 5/5  L EHL 5/5; R EHL 5/5     Drift:  absent  Tone:  normal  + Hoffmans     Sensation: intact       Lab Results   Component Value Date    WBC 6.1 01/15/2024    HGB 12.7 (L) 01/15/2024    HCT 40.5 (L) 01/15/2024     (H) 01/15/2024    ALT 29 01/08/2024    AST 39 01/08/2024     01/15/2024    K 4.1 01/15/2024     01/15/2024    CREATININE 0.5 (L) 01/15/2024    BUN 15 01/15/2024    CO2 26 01/15/2024    INR 0.97 06/12/2023    CRP 3.4 01/12/2024    SEDRATE 8 01/12/2024       A/P  68 y.o. male who presents with frequent falls, cervical stenosis with cord compression      ID following for left ankle open wound   Ok for PT and OT   Hibiclens bath daily and keep left ankle wrapped and dry during shower   Planning on surgery likely Tomorrow    Please contact neurosurgery with any changes in patients neurologic status.     Electronically signed by Luz Herson, APRN - CNP on 1/15/2024 at 10:25 AM

## 2024-01-15 NOTE — CARE COORDINATION
Transitional planning: Spoke w/ TJ at [facility] and can accept patient without HENS or another precert, because was already approved to go to [facility] last week from [place]. Since was a transfer from [place] and did not go somewhere else in between, he is good to come as soon as discharged and made patient aware.    1700 Notified by RN that patient is having surgery on Tuesday.

## 2024-01-15 NOTE — PROGRESS NOTES
[facility] Wound Ostomy  Nurse  Consult Note       NAME:  Moises Triplett  MEDICAL RECORD NUMBER:  [id number]  AGE: 68 y.o.   GENDER: male  : 1955  TODAY'S DATE:  1/15/2024    Subjective   Reason for [facility] Nurse Evaluation and Assessment: chronic left ankle wound.      Moises Triplett is a 68 y.o. male referred by:   [] Physician  [] Nursing  [] Other:     Wound Identification:  Wound Type: non-healing surgical    Wound History:   Fractured his ankle in May 2022 with subsequent bone infection requiring eventual hardware removal, except one screw.  He has had surgical I&D's with [facility] through the former [facility] and most recently has followed with [facility].    Current Wound Care Treatment:    Last treatment plan included use of collagen and Hydrofera Blue    Presents with left lateral ankle wound that is somewhat hypergranular with yellow drainage.  Reports the wound had MRSA cultured in the past.      Patient Goal of Care:  [x] Wound Healing  [] Odor Control  [] Palliative Care  [] Pain Control   [] Other:         PAST MEDICAL HISTORY    No past medical history on file.    PAST SURGICAL HISTORY    No past surgical history on file.    FAMILY HISTORY    No family history on file.    SOCIAL HISTORY    Social History     Tobacco Use    Smoking status: Former     Types: Cigarettes    Smokeless tobacco: Never   Substance Use Topics    Alcohol use: Yes     Comment: beer a day    Drug use: Yes     Frequency: 7.0 times per week     Types: Marijuana (Weed)       ALLERGIES    Allergies   Allergen Reactions    Latex Rash    Sulfa Antibiotics Rash       MEDICATIONS    No current facility-administered medications on file prior to encounter.     Current Outpatient Medications on File Prior to Encounter   Medication Sig Dispense Refill    ibuprofen (ADVIL;MOTRIN) 600 MG tablet Take 1 tablet by mouth every 6 hours as needed for Pain      atorvastatin (LIPITOR) 10 MG tablet Take 1 tablet by mouth at bedtime

## 2024-01-15 NOTE — PROGRESS NOTES
[facility]  Office: [phone]  Chapin Muse DO, Cyrus Dunn DO, Franky Jules DO, Danny Sexton, DO, Luis Angel Gracia MD, Julee Quiñones MD, Delroy Landeros MD, Madison Collier MD,  Tony Rosales MD, Joni Dubois MD, Soila Oliva MD,  Neftali Larkin DO, Alvaro Maria MD, Juan Luis Faustin MD, Torin Muse DO, Eden Barajas MD,  Marquis Lemus DO, Yarely Molina MD, Maya West MD, Karen Adler MD, Shyam Beckman MD,  Rojas Neri MD, Axel Tello MD, Ryan Batista MD, Tima Hoffmann MD, Michael Grimes MD, Angus Hunt MD, Ramiro Burns DO, Bhupinder Mao DO, Mari Villasenor MD,  Davion Graves MD, Shirley Waterhouse, CNP,  Alycia De La Fuente, CNP, Tavon Watkins, CNP,  Katharine Merrill, DNP, Laura Small, CNP, Brenda Jade, CNP, Malena Rossi CNP, Marya Watson, CNP, Kortney Wilson, CNP, Zabrina Obrien, PA-C, Soila Barber, PA-C, Tia Mohan, CNP, Betina Saldivar, CNS, Carmen Worley, CNP, Mamie Crain, CNP, Tracy Schwab, CNP         [facility]   IN-PATIENT SERVICE   [facility]    Progress Note    1/15/2024    8:39 AM    Name:   Moises Triplett  MRN:     [id number]     Acct:      [id number]   Room:   [address] [place], [place] [address] Day:  3  Admit Date:  1/12/2024  5:47 PM    PCP:   Amparo Bradford APRN - NP  Code Status:  Full Code    Subjective:     C/C: Right shoulder pain, frequent falls    Interval History Status: not changed.     Patient seen and evaluated in room resting in bed.  No acute events overnight.  Evaluated by neurosurgery, planning surgical intervention tomorrow per patient    Brief History:     Moises Triplett is a 68 y.o. Non- / non  male who initially presented to [place] ED 1/8/24 for frequent falls. Patient has a medical history significant for hyperlipidemia and osteoarthritis.      Patient states he has had a tough time moving around and maintaining his balance. He reports this has been ongoing for months. He presented to  SPINE FLEXION AND EXTENSION    Result Date: 1/14/2024  1. Grade 1 anterolisthesis at C3-C4 with no evidence of instability. 2. Approximately 3 mm anterolisthesis at C7-T1 in neutral that does not change with extension.  This portion the spine is not visible due to the overlying shoulders on the flexion view. 3. Advanced multilevel spinal degenerative changes as noted previously.     XR CERVICAL SPINE FLEXION AND EXTENSION    Result Date: 1/13/2024  Severe multilevel degenerative spondylosis most prominent at C3-4, C4-5, C5-6, C6-7 and C7-T1. No evidence of instability.     MRI CERVICAL SPINE W WO CONTRAST    Result Date: 1/11/2024  1. Abnormal signal and enhancement involving the C3 through C6 vertebral bodies. Abnormal enhancing material within the ventral cervical spinal canal extending from C3-4 through C6-7 with abnormal prevertebral soft tissue signal and enhancement extending from C4 through C7.  While findings could be degenerative/inflammatory, these are concerning for concerning for discitis/osteomyelitis and possible phlegmon in the appropriate clinical setting. 2. Multilevel degenerative disc disease with severe spinal canal stenosis and spinal cord compression at C4-5 and C5-6. Abnormal T2 hyperintense signal within the cervical spinal cord from C3 through C5 compatible with cord edema and/or myelomalacia. 3. Moderate to severe spinal canal stenosis at C3-4. 4. Moderate bilateral neural foraminal narrowing at C3-4, C4-5, and C5-6. Findings were discussed with Dr. Alfonso at 5:19 pm on 1/11/2024.     MRI BRAIN W WO CONTRAST    Result Date: 1/9/2024  Chronic microvascular disease without acute intracranial abnormality.     XR SHOULDER RIGHT (MIN 2 VIEWS)    Result Date: 1/8/2024  Right shoulder: 1. Suspected nondisplaced fracture at the junction of the acromion and scapula. 2. Severe right glenohumeral osteoarthrosis. 3. Moderate to severe degenerative changes of the right AC joint. Right knee: 1. Mild  degenerative changes of the medial compartment. 2. No acute fracture or dislocation. Left knee: 1. Mild degenerative change of the medial compartment. 2. No acute fracture or dislocation.     XR KNEE LEFT (3 VIEWS)    Result Date: 1/8/2024  Right shoulder: 1. Suspected nondisplaced fracture at the junction of the acromion and scapula. 2. Severe right glenohumeral osteoarthrosis. 3. Moderate to severe degenerative changes of the right AC joint. Right knee: 1. Mild degenerative changes of the medial compartment. 2. No acute fracture or dislocation. Left knee: 1. Mild degenerative change of the medial compartment. 2. No acute fracture or dislocation.     XR KNEE RIGHT (3 VIEWS)    Result Date: 1/8/2024  Right shoulder: 1. Suspected nondisplaced fracture at the junction of the acromion and scapula. 2. Severe right glenohumeral osteoarthrosis. 3. Moderate to severe degenerative changes of the right AC joint. Right knee: 1. Mild degenerative changes of the medial compartment. 2. No acute fracture or dislocation. Left knee: 1. Mild degenerative change of the medial compartment. 2. No acute fracture or dislocation.     XR CHEST PORTABLE    Result Date: 1/8/2024  No acute cardiopulmonary process.     CT CERVICAL SPINE WO CONTRAST    Result Date: 1/8/2024  1. No definite acute osseous abnormality of the cervical spine. 2. Extensive multilevel DJD/DDD changes, slightly increased as compared to 2021 with associated findings, as above.     CT Head W/O Contrast    Result Date: 1/8/2024  No acute intracranial abnormality.       Physical Examination:        General appearance:  alert, cooperative and no distress  Mental Status:  oriented to person, place and time and normal affect  Lungs:  clear to auscultation bilaterally, normal effort  Heart:  regular rate and rhythm, no murmur  Abdomen:  soft, nontender, nondistended, normal bowel sounds, no masses, hepatomegaly, splenomegaly  Extremities:  no edema, redness, tenderness in the

## 2024-01-16 ENCOUNTER — ANESTHESIA EVENT (OUTPATIENT)
Dept: OPERATING ROOM | Age: 69
End: 2024-01-16
Payer: MEDICARE

## 2024-01-16 ENCOUNTER — APPOINTMENT (OUTPATIENT)
Dept: GENERAL RADIOLOGY | Age: 69
DRG: 472 | End: 2024-01-16
Attending: INTERNAL MEDICINE
Payer: MEDICARE

## 2024-01-16 ENCOUNTER — ANESTHESIA (OUTPATIENT)
Dept: OPERATING ROOM | Age: 69
End: 2024-01-16
Payer: MEDICARE

## 2024-01-16 LAB
ABO + RH BLD: NORMAL
ANION GAP SERPL CALCULATED.3IONS-SCNC: 10 MMOL/L (ref 9–17)
ARM BAND NUMBER: NORMAL
ARTERIAL PATENCY WRIST A: ABNORMAL
BLOOD BANK SAMPLE EXPIRATION: NORMAL
BLOOD GROUP ANTIBODIES SERPL: NEGATIVE
BODY TEMPERATURE: 37
BUN SERPL-MCNC: 15 MG/DL (ref 8–23)
CA-I BLD-SCNC: 1.2 MMOL/L (ref 1.13–1.33)
CALCIUM SERPL-MCNC: 8.8 MG/DL (ref 8.6–10.4)
CHLORIDE SERPL-SCNC: 103 MMOL/L (ref 98–107)
CHLORIDE, WHOLE BLOOD: 107 MMOL/L (ref 98–110)
CO2 SERPL-SCNC: 22 MMOL/L (ref 20–31)
COHGB MFR BLD: 0.7 % (ref 0–5)
CREAT SERPL-MCNC: 0.4 MG/DL (ref 0.7–1.2)
EKG ATRIAL RATE: 65 BPM
EKG P AXIS: 101 DEGREES
EKG P-R INTERVAL: 132 MS
EKG Q-T INTERVAL: 442 MS
EKG QRS DURATION: 82 MS
EKG QTC CALCULATION (BAZETT): 459 MS
EKG R AXIS: -11 DEGREES
EKG T AXIS: 60 DEGREES
EKG VENTRICULAR RATE: 65 BPM
ERYTHROCYTE [DISTWIDTH] IN BLOOD BY AUTOMATED COUNT: 18.8 % (ref 11.8–14.4)
FIO2 ON VENT: ABNORMAL %
GFR SERPL CREATININE-BSD FRML MDRD: >60 ML/MIN/1.73M2
GLUCOSE BLD-MCNC: 107 MG/DL (ref 75–110)
GLUCOSE SERPL-MCNC: 85 MG/DL (ref 70–99)
HCO3 ARTERIAL: 22.1 MMOL/L (ref 22–27)
HCT VFR BLD AUTO: 37.9 % (ref 40.7–50.3)
HCT VFR BLD CALC: 33.7 % (ref 40.7–50.3)
HEMOGLOBIN: 10.9 GM/DL (ref 13–17)
HGB BLD-MCNC: 12.1 G/DL (ref 13–17)
INR PPP: 1
LACTIC ACID, WHOLE BLOOD: 1.1 MMOL/L (ref 0.7–2.1)
MCH RBC QN AUTO: 26.1 PG (ref 25.2–33.5)
MCHC RBC AUTO-ENTMCNC: 31.9 G/DL (ref 28.4–34.8)
MCV RBC AUTO: 81.9 FL (ref 82.6–102.9)
NEGATIVE BASE EXCESS, ART: 2.8 MMOL/L (ref 0–2)
NRBC BLD-RTO: 0 PER 100 WBC
O2 SAT, ARTERIAL: 99.2 % (ref 94–100)
PARTIAL THROMBOPLASTIN TIME: 34.5 SEC (ref 23–36.5)
PCO2 ARTERIAL: 41.1 MMHG (ref 32–45)
PH ARTERIAL: 7.35 (ref 7.35–7.45)
PLATELET # BLD AUTO: 434 K/UL (ref 138–453)
PMV BLD AUTO: 8.6 FL (ref 8.1–13.5)
PO2 ARTERIAL: 246 MMHG (ref 75–95)
POTASSIUM SERPL-SCNC: 4 MMOL/L (ref 3.7–5.3)
POTASSIUM, WHOLE BLOOD: 4.2 MMOL/L (ref 3.6–5)
PROTHROMBIN TIME: 12.8 SEC (ref 11.7–14.9)
RBC # BLD AUTO: 4.63 M/UL (ref 4.21–5.77)
SODIUM SERPL-SCNC: 135 MMOL/L (ref 135–144)
SODIUM, WHOLE BLOOD: 136 MMOL/L (ref 136–145)
WBC OTHER # BLD: 6.6 K/UL (ref 3.5–11.3)

## 2024-01-16 PROCEDURE — 82805 BLOOD GASES W/O2 SATURATION: CPT

## 2024-01-16 PROCEDURE — 2580000003 HC RX 258

## 2024-01-16 PROCEDURE — 6370000000 HC RX 637 (ALT 250 FOR IP): Performed by: STUDENT IN AN ORGANIZED HEALTH CARE EDUCATION/TRAINING PROGRAM

## 2024-01-16 PROCEDURE — 00NW0ZZ RELEASE CERVICAL SPINAL CORD, OPEN APPROACH: ICD-10-PCS | Performed by: NEUROLOGICAL SURGERY

## 2024-01-16 PROCEDURE — 0RG2071 FUSION OF 2 OR MORE CERVICAL VERTEBRAL JOINTS WITH AUTOLOGOUS TISSUE SUBSTITUTE, POSTERIOR APPROACH, POSTERIOR COLUMN, OPEN APPROACH: ICD-10-PCS | Performed by: NEUROLOGICAL SURGERY

## 2024-01-16 PROCEDURE — 2580000003 HC RX 258: Performed by: STUDENT IN AN ORGANIZED HEALTH CARE EDUCATION/TRAINING PROGRAM

## 2024-01-16 PROCEDURE — 1200000000 HC SEMI PRIVATE

## 2024-01-16 PROCEDURE — 99232 SBSQ HOSP IP/OBS MODERATE 35: CPT | Performed by: NURSE PRACTITIONER

## 2024-01-16 PROCEDURE — 3700000001 HC ADD 15 MINUTES (ANESTHESIA): Performed by: NEUROLOGICAL SURGERY

## 2024-01-16 PROCEDURE — 7100000001 HC PACU RECOVERY - ADDTL 15 MIN: Performed by: NEUROLOGICAL SURGERY

## 2024-01-16 PROCEDURE — 0RG4071 FUSION OF CERVICOTHORACIC VERTEBRAL JOINT WITH AUTOLOGOUS TISSUE SUBSTITUTE, POSTERIOR APPROACH, POSTERIOR COLUMN, OPEN APPROACH: ICD-10-PCS | Performed by: NEUROLOGICAL SURGERY

## 2024-01-16 PROCEDURE — 93005 ELECTROCARDIOGRAM TRACING: CPT

## 2024-01-16 PROCEDURE — 6370000000 HC RX 637 (ALT 250 FOR IP): Performed by: NEUROLOGICAL SURGERY

## 2024-01-16 PROCEDURE — 6360000002 HC RX W HCPCS

## 2024-01-16 PROCEDURE — 85610 PROTHROMBIN TIME: CPT

## 2024-01-16 PROCEDURE — 2720000010 HC SURG SUPPLY STERILE: Performed by: NEUROLOGICAL SURGERY

## 2024-01-16 PROCEDURE — 6370000000 HC RX 637 (ALT 250 FOR IP): Performed by: INTERNAL MEDICINE

## 2024-01-16 PROCEDURE — 22614 ARTHRD PST TQ 1NTRSPC EA ADD: CPT | Performed by: NEUROLOGICAL SURGERY

## 2024-01-16 PROCEDURE — 36415 COLL VENOUS BLD VENIPUNCTURE: CPT

## 2024-01-16 PROCEDURE — 86850 RBC ANTIBODY SCREEN: CPT

## 2024-01-16 PROCEDURE — 63015 REMOVE SPINE LAMINA >2 CRVCL: CPT | Performed by: NEUROLOGICAL SURGERY

## 2024-01-16 PROCEDURE — 6360000002 HC RX W HCPCS: Performed by: STUDENT IN AN ORGANIZED HEALTH CARE EDUCATION/TRAINING PROGRAM

## 2024-01-16 PROCEDURE — 80048 BASIC METABOLIC PNL TOTAL CA: CPT

## 2024-01-16 PROCEDURE — 2500000003 HC RX 250 WO HCPCS: Performed by: STUDENT IN AN ORGANIZED HEALTH CARE EDUCATION/TRAINING PROGRAM

## 2024-01-16 PROCEDURE — 86900 BLOOD TYPING SEROLOGIC ABO: CPT

## 2024-01-16 PROCEDURE — 22600 ARTHRD PST TQ 1NTRSPC CRV: CPT | Performed by: PHYSICIAN ASSISTANT

## 2024-01-16 PROCEDURE — L0120 CERV FLEX N/ADJ FOAM PRE OTS: HCPCS | Performed by: NEUROLOGICAL SURGERY

## 2024-01-16 PROCEDURE — 71045 X-RAY EXAM CHEST 1 VIEW: CPT

## 2024-01-16 PROCEDURE — 63015 REMOVE SPINE LAMINA >2 CRVCL: CPT | Performed by: PHYSICIAN ASSISTANT

## 2024-01-16 PROCEDURE — 2709999900 HC NON-CHARGEABLE SUPPLY: Performed by: NEUROLOGICAL SURGERY

## 2024-01-16 PROCEDURE — 22842 INSERT SPINE FIXATION DEVICE: CPT | Performed by: NEUROLOGICAL SURGERY

## 2024-01-16 PROCEDURE — 4A11X4G MONITORING OF PERIPHERAL NERVOUS ELECTRICAL ACTIVITY, INTRAOPERATIVE, EXTERNAL APPROACH: ICD-10-PCS | Performed by: NEUROLOGICAL SURGERY

## 2024-01-16 PROCEDURE — 2580000003 HC RX 258: Performed by: ANESTHESIOLOGY

## 2024-01-16 PROCEDURE — 84132 ASSAY OF SERUM POTASSIUM: CPT

## 2024-01-16 PROCEDURE — C9290 INJ, BUPIVACAINE LIPOSOME: HCPCS | Performed by: NEUROLOGICAL SURGERY

## 2024-01-16 PROCEDURE — 85027 COMPLETE CBC AUTOMATED: CPT

## 2024-01-16 PROCEDURE — 3700000000 HC ANESTHESIA ATTENDED CARE: Performed by: NEUROLOGICAL SURGERY

## 2024-01-16 PROCEDURE — 22600 ARTHRD PST TQ 1NTRSPC CRV: CPT | Performed by: NEUROLOGICAL SURGERY

## 2024-01-16 PROCEDURE — 82330 ASSAY OF CALCIUM: CPT

## 2024-01-16 PROCEDURE — 85018 HEMOGLOBIN: CPT

## 2024-01-16 PROCEDURE — 22614 ARTHRD PST TQ 1NTRSPC EA ADD: CPT | Performed by: PHYSICIAN ASSISTANT

## 2024-01-16 PROCEDURE — 61783 SCAN PROC SPINAL: CPT | Performed by: NEUROLOGICAL SURGERY

## 2024-01-16 PROCEDURE — 6360000002 HC RX W HCPCS: Performed by: NEUROLOGICAL SURGERY

## 2024-01-16 PROCEDURE — 7100000000 HC PACU RECOVERY - FIRST 15 MIN: Performed by: NEUROLOGICAL SURGERY

## 2024-01-16 PROCEDURE — 83605 ASSAY OF LACTIC ACID: CPT

## 2024-01-16 PROCEDURE — 2500000003 HC RX 250 WO HCPCS: Performed by: NEUROLOGICAL SURGERY

## 2024-01-16 PROCEDURE — 3600000004 HC SURGERY LEVEL 4 BASE: Performed by: NEUROLOGICAL SURGERY

## 2024-01-16 PROCEDURE — 85014 HEMATOCRIT: CPT

## 2024-01-16 PROCEDURE — 87086 URINE CULTURE/COLONY COUNT: CPT

## 2024-01-16 PROCEDURE — 93010 ELECTROCARDIOGRAM REPORT: CPT | Performed by: INTERNAL MEDICINE

## 2024-01-16 PROCEDURE — 3600000014 HC SURGERY LEVEL 4 ADDTL 15MIN: Performed by: NEUROLOGICAL SURGERY

## 2024-01-16 PROCEDURE — 22842 INSERT SPINE FIXATION DEVICE: CPT | Performed by: PHYSICIAN ASSISTANT

## 2024-01-16 PROCEDURE — C1713 ANCHOR/SCREW BN/BN,TIS/BN: HCPCS | Performed by: NEUROLOGICAL SURGERY

## 2024-01-16 PROCEDURE — 85730 THROMBOPLASTIN TIME PARTIAL: CPT

## 2024-01-16 PROCEDURE — 2500000003 HC RX 250 WO HCPCS

## 2024-01-16 PROCEDURE — 86901 BLOOD TYPING SEROLOGIC RH(D): CPT

## 2024-01-16 DEVICE — SET SCREW 3600215 M6 SETSCREW
Type: IMPLANTABLE DEVICE | Site: NECK | Status: FUNCTIONAL
Brand: INFINITY™ OCCIPITOCERVICAL UPPER THORACIC SYSTEM

## 2024-01-16 DEVICE — GRAFT CELLR BNE MATRX INFLUX SPARC 5CC: Type: IMPLANTABLE DEVICE | Site: NECK | Status: FUNCTIONAL

## 2024-01-16 RX ORDER — SODIUM CHLORIDE, SODIUM LACTATE, POTASSIUM CHLORIDE, CALCIUM CHLORIDE 600; 310; 30; 20 MG/100ML; MG/100ML; MG/100ML; MG/100ML
INJECTION, SOLUTION INTRAVENOUS CONTINUOUS PRN
Status: DISCONTINUED | OUTPATIENT
Start: 2024-01-16 | End: 2024-01-16 | Stop reason: SDUPTHER

## 2024-01-16 RX ORDER — METHOCARBAMOL 500 MG/1
500 TABLET, FILM COATED ORAL 3 TIMES DAILY
Status: DISCONTINUED | OUTPATIENT
Start: 2024-01-17 | End: 2024-01-23 | Stop reason: HOSPADM

## 2024-01-16 RX ORDER — GLYCOPYRROLATE 0.2 MG/ML
INJECTION INTRAMUSCULAR; INTRAVENOUS PRN
Status: DISCONTINUED | OUTPATIENT
Start: 2024-01-16 | End: 2024-01-16 | Stop reason: SDUPTHER

## 2024-01-16 RX ORDER — SODIUM CHLORIDE 0.9 % (FLUSH) 0.9 %
5-40 SYRINGE (ML) INJECTION PRN
Status: DISCONTINUED | OUTPATIENT
Start: 2024-01-16 | End: 2024-01-23 | Stop reason: HOSPADM

## 2024-01-16 RX ORDER — GINSENG 100 MG
CAPSULE ORAL PRN
Status: DISCONTINUED | OUTPATIENT
Start: 2024-01-16 | End: 2024-01-16 | Stop reason: ALTCHOICE

## 2024-01-16 RX ORDER — SODIUM CHLORIDE 0.9 % (FLUSH) 0.9 %
5-40 SYRINGE (ML) INJECTION PRN
Status: DISCONTINUED | OUTPATIENT
Start: 2024-01-16 | End: 2024-01-16 | Stop reason: HOSPADM

## 2024-01-16 RX ORDER — FENTANYL CITRATE 50 UG/ML
25 INJECTION, SOLUTION INTRAMUSCULAR; INTRAVENOUS EVERY 5 MIN PRN
Status: DISCONTINUED | OUTPATIENT
Start: 2024-01-16 | End: 2024-01-16 | Stop reason: HOSPADM

## 2024-01-16 RX ORDER — LIDOCAINE HYDROCHLORIDE AND EPINEPHRINE 10; 10 MG/ML; UG/ML
INJECTION, SOLUTION INFILTRATION; PERINEURAL PRN
Status: DISCONTINUED | OUTPATIENT
Start: 2024-01-16 | End: 2024-01-16 | Stop reason: ALTCHOICE

## 2024-01-16 RX ORDER — ROCURONIUM BROMIDE 10 MG/ML
INJECTION, SOLUTION INTRAVENOUS PRN
Status: DISCONTINUED | OUTPATIENT
Start: 2024-01-16 | End: 2024-01-16 | Stop reason: SDUPTHER

## 2024-01-16 RX ORDER — POLYETHYLENE GLYCOL 3350 17 G/17G
17 POWDER, FOR SOLUTION ORAL DAILY PRN
Status: DISCONTINUED | OUTPATIENT
Start: 2024-01-16 | End: 2024-01-23 | Stop reason: HOSPADM

## 2024-01-16 RX ORDER — SODIUM CHLORIDE 0.9 % (FLUSH) 0.9 %
5-40 SYRINGE (ML) INJECTION EVERY 12 HOURS SCHEDULED
Status: DISCONTINUED | OUTPATIENT
Start: 2024-01-16 | End: 2024-01-16 | Stop reason: HOSPADM

## 2024-01-16 RX ORDER — ACETAMINOPHEN 325 MG/1
650 TABLET ORAL EVERY 6 HOURS
Status: DISCONTINUED | OUTPATIENT
Start: 2024-01-17 | End: 2024-01-23 | Stop reason: HOSPADM

## 2024-01-16 RX ORDER — VANCOMYCIN HYDROCHLORIDE 1 G/20ML
INJECTION, POWDER, LYOPHILIZED, FOR SOLUTION INTRAVENOUS PRN
Status: DISCONTINUED | OUTPATIENT
Start: 2024-01-16 | End: 2024-01-16 | Stop reason: ALTCHOICE

## 2024-01-16 RX ORDER — MIDAZOLAM HYDROCHLORIDE 2 MG/2ML
1 INJECTION, SOLUTION INTRAMUSCULAR; INTRAVENOUS EVERY 10 MIN PRN
Status: DISCONTINUED | OUTPATIENT
Start: 2024-01-16 | End: 2024-01-16 | Stop reason: HOSPADM

## 2024-01-16 RX ORDER — PROPOFOL 10 MG/ML
INJECTION, EMULSION INTRAVENOUS PRN
Status: DISCONTINUED | OUTPATIENT
Start: 2024-01-16 | End: 2024-01-16 | Stop reason: SDUPTHER

## 2024-01-16 RX ORDER — OXYCODONE HYDROCHLORIDE 5 MG/1
5 TABLET ORAL EVERY 4 HOURS PRN
Status: DISCONTINUED | OUTPATIENT
Start: 2024-01-16 | End: 2024-01-17

## 2024-01-16 RX ORDER — BUPIVACAINE HYDROCHLORIDE AND EPINEPHRINE 5; 5 MG/ML; UG/ML
INJECTION, SOLUTION PERINEURAL PRN
Status: DISCONTINUED | OUTPATIENT
Start: 2024-01-16 | End: 2024-01-16 | Stop reason: ALTCHOICE

## 2024-01-16 RX ORDER — PROPOFOL 10 MG/ML
INJECTION, EMULSION INTRAVENOUS CONTINUOUS PRN
Status: DISCONTINUED | OUTPATIENT
Start: 2024-01-16 | End: 2024-01-16 | Stop reason: SDUPTHER

## 2024-01-16 RX ORDER — DEXAMETHASONE SODIUM PHOSPHATE 10 MG/ML
INJECTION INTRAMUSCULAR; INTRAVENOUS PRN
Status: DISCONTINUED | OUTPATIENT
Start: 2024-01-16 | End: 2024-01-16 | Stop reason: SDUPTHER

## 2024-01-16 RX ORDER — SODIUM CHLORIDE 0.9 % (FLUSH) 0.9 %
5-40 SYRINGE (ML) INJECTION EVERY 12 HOURS SCHEDULED
Status: DISCONTINUED | OUTPATIENT
Start: 2024-01-17 | End: 2024-01-23 | Stop reason: HOSPADM

## 2024-01-16 RX ORDER — SODIUM CHLORIDE 9 MG/ML
INJECTION, SOLUTION INTRAVENOUS CONTINUOUS
Status: DISCONTINUED | OUTPATIENT
Start: 2024-01-16 | End: 2024-01-17

## 2024-01-16 RX ORDER — LIDOCAINE HYDROCHLORIDE 10 MG/ML
1 INJECTION, SOLUTION INFILTRATION; PERINEURAL
Status: DISCONTINUED | OUTPATIENT
Start: 2024-01-16 | End: 2024-01-16 | Stop reason: HOSPADM

## 2024-01-16 RX ORDER — ENOXAPARIN SODIUM 100 MG/ML
40 INJECTION SUBCUTANEOUS DAILY
Status: DISCONTINUED | OUTPATIENT
Start: 2024-01-18 | End: 2024-01-23 | Stop reason: HOSPADM

## 2024-01-16 RX ORDER — ONDANSETRON 2 MG/ML
INJECTION INTRAMUSCULAR; INTRAVENOUS PRN
Status: DISCONTINUED | OUTPATIENT
Start: 2024-01-16 | End: 2024-01-16 | Stop reason: SDUPTHER

## 2024-01-16 RX ORDER — BISACODYL 10 MG
10 SUPPOSITORY, RECTAL RECTAL DAILY PRN
Status: DISCONTINUED | OUTPATIENT
Start: 2024-01-16 | End: 2024-01-23 | Stop reason: HOSPADM

## 2024-01-16 RX ORDER — SODIUM CHLORIDE 9 MG/ML
INJECTION, SOLUTION INTRAVENOUS PRN
Status: DISCONTINUED | OUTPATIENT
Start: 2024-01-16 | End: 2024-01-16 | Stop reason: HOSPADM

## 2024-01-16 RX ORDER — SODIUM CHLORIDE 9 MG/ML
INJECTION, SOLUTION INTRAVENOUS PRN
Status: DISCONTINUED | OUTPATIENT
Start: 2024-01-16 | End: 2024-01-23 | Stop reason: HOSPADM

## 2024-01-16 RX ORDER — POLYETHYLENE GLYCOL 3350 17 G/17G
17 POWDER, FOR SOLUTION ORAL DAILY
Status: DISCONTINUED | OUTPATIENT
Start: 2024-01-17 | End: 2024-01-23 | Stop reason: HOSPADM

## 2024-01-16 RX ORDER — FAMOTIDINE 20 MG/1
20 TABLET, FILM COATED ORAL 2 TIMES DAILY
Status: DISCONTINUED | OUTPATIENT
Start: 2024-01-17 | End: 2024-01-17

## 2024-01-16 RX ORDER — FENTANYL CITRATE 50 UG/ML
INJECTION, SOLUTION INTRAMUSCULAR; INTRAVENOUS PRN
Status: DISCONTINUED | OUTPATIENT
Start: 2024-01-16 | End: 2024-01-16 | Stop reason: SDUPTHER

## 2024-01-16 RX ORDER — LIDOCAINE HYDROCHLORIDE 10 MG/ML
INJECTION, SOLUTION EPIDURAL; INFILTRATION; INTRACAUDAL; PERINEURAL PRN
Status: DISCONTINUED | OUTPATIENT
Start: 2024-01-16 | End: 2024-01-16 | Stop reason: SDUPTHER

## 2024-01-16 RX ORDER — OXYCODONE HYDROCHLORIDE 5 MG/1
10 TABLET ORAL EVERY 4 HOURS PRN
Status: DISCONTINUED | OUTPATIENT
Start: 2024-01-16 | End: 2024-01-17

## 2024-01-16 RX ORDER — CEFAZOLIN SODIUM 1 G/3ML
INJECTION, POWDER, FOR SOLUTION INTRAMUSCULAR; INTRAVENOUS PRN
Status: DISCONTINUED | OUTPATIENT
Start: 2024-01-16 | End: 2024-01-16 | Stop reason: SDUPTHER

## 2024-01-16 RX ADMIN — PHENYLEPHRINE HYDROCHLORIDE 100 MCG: 10 INJECTION INTRAVENOUS at 17:37

## 2024-01-16 RX ADMIN — SODIUM CHLORIDE: 9 INJECTION, SOLUTION INTRAVENOUS at 21:45

## 2024-01-16 RX ADMIN — ROCURONIUM BROMIDE 40 MG: 10 INJECTION, SOLUTION INTRAVENOUS at 16:38

## 2024-01-16 RX ADMIN — PHENYLEPHRINE HYDROCHLORIDE 100 MCG: 10 INJECTION INTRAVENOUS at 17:26

## 2024-01-16 RX ADMIN — HYDROCODONE BITARTRATE AND ACETAMINOPHEN 2 TABLET: 5; 325 TABLET ORAL at 09:08

## 2024-01-16 RX ADMIN — ROCURONIUM BROMIDE 20 MG: 10 INJECTION, SOLUTION INTRAVENOUS at 18:32

## 2024-01-16 RX ADMIN — PROPOFOL 50 MCG/KG/MIN: 10 INJECTION, EMULSION INTRAVENOUS at 17:15

## 2024-01-16 RX ADMIN — ATORVASTATIN CALCIUM 10 MG: 10 TABLET, FILM COATED ORAL at 09:08

## 2024-01-16 RX ADMIN — LIDOCAINE HYDROCHLORIDE 50 MG: 10 INJECTION, SOLUTION EPIDURAL; INFILTRATION; INTRACAUDAL; PERINEURAL at 16:38

## 2024-01-16 RX ADMIN — SUFENTANIL CITRATE 0.2 MCG/KG/HR: 50 INJECTION, SOLUTION EPIDURAL; INTRAVENOUS at 17:15

## 2024-01-16 RX ADMIN — SODIUM CHLORIDE, POTASSIUM CHLORIDE, SODIUM LACTATE AND CALCIUM CHLORIDE: 600; 310; 30; 20 INJECTION, SOLUTION INTRAVENOUS at 18:55

## 2024-01-16 RX ADMIN — ONDANSETRON 4 MG: 2 INJECTION INTRAMUSCULAR; INTRAVENOUS at 21:07

## 2024-01-16 RX ADMIN — PROPOFOL 100 MG: 10 INJECTION, EMULSION INTRAVENOUS at 17:20

## 2024-01-16 RX ADMIN — SODIUM CHLORIDE: 9 INJECTION, SOLUTION INTRAVENOUS at 10:27

## 2024-01-16 RX ADMIN — PHENYLEPHRINE HYDROCHLORIDE 40 MCG/MIN: 10 INJECTION INTRAVENOUS at 17:38

## 2024-01-16 RX ADMIN — SODIUM CHLORIDE, PRESERVATIVE FREE 10 ML: 5 INJECTION INTRAVENOUS at 09:09

## 2024-01-16 RX ADMIN — FENTANYL CITRATE 50 MCG: 0.05 INJECTION, SOLUTION INTRAMUSCULAR; INTRAVENOUS at 17:20

## 2024-01-16 RX ADMIN — FENTANYL CITRATE 50 MCG: 0.05 INJECTION, SOLUTION INTRAMUSCULAR; INTRAVENOUS at 18:02

## 2024-01-16 RX ADMIN — PROPOFOL 150 MG: 10 INJECTION, EMULSION INTRAVENOUS at 16:38

## 2024-01-16 RX ADMIN — HYDROMORPHONE HYDROCHLORIDE 0.5 MG: 1 INJECTION, SOLUTION INTRAMUSCULAR; INTRAVENOUS; SUBCUTANEOUS at 22:42

## 2024-01-16 RX ADMIN — HYDROMORPHONE HYDROCHLORIDE 0.5 MG: 1 INJECTION, SOLUTION INTRAMUSCULAR; INTRAVENOUS; SUBCUTANEOUS at 22:18

## 2024-01-16 RX ADMIN — PHENYLEPHRINE HYDROCHLORIDE 100 MCG: 10 INJECTION INTRAVENOUS at 17:42

## 2024-01-16 RX ADMIN — DEXAMETHASONE SODIUM PHOSPHATE 10 MG: 10 INJECTION INTRAMUSCULAR; INTRAVENOUS at 16:38

## 2024-01-16 RX ADMIN — CARVEDILOL 3.12 MG: 3.12 TABLET, FILM COATED ORAL at 09:08

## 2024-01-16 RX ADMIN — SODIUM CHLORIDE: 9 INJECTION, SOLUTION INTRAVENOUS at 19:50

## 2024-01-16 RX ADMIN — CEFAZOLIN 2 G: 1 INJECTION, POWDER, FOR SOLUTION INTRAMUSCULAR; INTRAVENOUS at 16:50

## 2024-01-16 RX ADMIN — FENTANYL CITRATE 50 MCG: 0.05 INJECTION, SOLUTION INTRAMUSCULAR; INTRAVENOUS at 17:07

## 2024-01-16 RX ADMIN — PHENYLEPHRINE HYDROCHLORIDE 100 MCG: 10 INJECTION INTRAVENOUS at 17:46

## 2024-01-16 RX ADMIN — SODIUM CHLORIDE: 9 INJECTION, SOLUTION INTRAVENOUS at 16:35

## 2024-01-16 RX ADMIN — CEFAZOLIN 2 G: 1 INJECTION, POWDER, FOR SOLUTION INTRAMUSCULAR; INTRAVENOUS at 20:42

## 2024-01-16 RX ADMIN — FENTANYL CITRATE 100 MCG: 0.05 INJECTION, SOLUTION INTRAMUSCULAR; INTRAVENOUS at 16:38

## 2024-01-16 RX ADMIN — PHENYLEPHRINE HYDROCHLORIDE 100 MCG: 10 INJECTION INTRAVENOUS at 17:39

## 2024-01-16 RX ADMIN — SUGAMMADEX 200 MG: 100 INJECTION, SOLUTION INTRAVENOUS at 22:04

## 2024-01-16 RX ADMIN — PHENYLEPHRINE HYDROCHLORIDE 100 MCG: 10 INJECTION INTRAVENOUS at 21:37

## 2024-01-16 RX ADMIN — HYDROCODONE BITARTRATE AND ACETAMINOPHEN 2 TABLET: 5; 325 TABLET ORAL at 02:17

## 2024-01-16 RX ADMIN — CHLORHEXIDINE GLUCONATE: 213 SOLUTION TOPICAL at 05:00

## 2024-01-16 RX ADMIN — PHENYLEPHRINE HYDROCHLORIDE 100 MCG: 10 INJECTION INTRAVENOUS at 16:57

## 2024-01-16 RX ADMIN — SODIUM CHLORIDE, POTASSIUM CHLORIDE, SODIUM LACTATE AND CALCIUM CHLORIDE: 600; 310; 30; 20 INJECTION, SOLUTION INTRAVENOUS at 16:56

## 2024-01-16 RX ADMIN — MUPIROCIN: 20 OINTMENT TOPICAL at 09:10

## 2024-01-16 RX ADMIN — GLYCOPYRROLATE 0.2 MG: 0.2 INJECTION INTRAMUSCULAR; INTRAVENOUS at 17:40

## 2024-01-16 ASSESSMENT — PAIN DESCRIPTION - LOCATION
LOCATION: NECK
LOCATION: NECK

## 2024-01-16 ASSESSMENT — PAIN SCALES - GENERAL
PAINLEVEL_OUTOF10: 8
PAINLEVEL_OUTOF10: 8
PAINLEVEL_OUTOF10: 6
PAINLEVEL_OUTOF10: 8
PAINLEVEL_OUTOF10: 8

## 2024-01-16 ASSESSMENT — PAIN - FUNCTIONAL ASSESSMENT: PAIN_FUNCTIONAL_ASSESSMENT: 0-10

## 2024-01-16 NOTE — PROGRESS NOTES
[facility]  Office: [phone]  Chapin Muse DO, Cyrus Dunn DO, Franky Jules DO, Danny Sexton DO, Luis Angel Gracia MD, Julee Quiñones MD, Delroy Landeros MD, Madison Collier MD,  Tony Rosales MD, Joni Dubois MD, Soila Oliva MD,  Neftali Larkin DO, Alvaro Maria MD, Juan Luis Faustin MD, Torin Muse DO, Eden Barajas MD,  Marquis Lemus DO, Yarely Molina MD, Maya West MD, Karen Adler MD, Shyam Beckman MD,  Rojas Neri MD, Axel Tello MD, Ryan Batista MD, Tima Hoffmann MD, Michael Grimes MD, Angus Hunt MD, Ramiro Burns DO, Bhupinder Mao DO, Mari Villasenor MD,  Davion Graves MD, Shirley Waterhouse, CNP,  Alycia De La Fuente, CNP, Tavon Watkins, CNP,  Katharine Merrill, DNP, Laura Small, CNP, Brenda Jade, CNP, Malena Rossi CNP, Marya Watson, CNP, Kortney Wilson, CNP, Zabrina Obrien, PA-C, Soila Barber, PA-C, Tia Mohan, CNP, Betina Saldivar, CNS, Carmen Worley, CNP, Mamie Crain, CNP, Tracy Schwab, CNP         [facility]   IN-PATIENT SERVICE   [facility]    Progress Note    1/16/2024    9:33 AM    Name:   Moises Triplett  MRN:     [id number]     Acct:      [id number]   Room:   [facility]6/0246-01  IP Day:  4  Admit Date:  1/12/2024  5:47 PM    PCP:   Amparo Bradford APRN - NP  Code Status:  Full Code    Subjective:     C/C: Right shoulder pain, frequent falls    Interval History Status: not changed.     Patient evaluated in room, no acute events overnight.  Has been n.p.o.  Plan for C3-T1 laminectomy and fusion today   Vital signs stable, patient states he did not sleep well last night   Brief History:     Moises Triplett is a 68 y.o. Non- / non  male who initially presented to [place] ED 1/8/24 for frequent falls. Patient has a medical history significant for hyperlipidemia and osteoarthritis.      Patient states he has had a tough time moving around and maintaining his balance. He reports this has been ongoing   soft, nontender, nondistended, normal bowel sounds, no masses, hepatomegaly, splenomegaly  Extremities:  no edema, redness, tenderness in the calves, right arm sling  Skin:  no gross lesions, rashes, induration    Assessment:        Hospital Problems             Last Modified POA    * (Principal) Cervical stenosis of spinal canal 1/12/2024 Yes    Essential (primary) hypertension 1/13/2024 Yes    Hyperlipidemia, unspecified 1/13/2024 Yes    Displaced fracture of acromial process, right shoulder, initial encounter for closed fracture 1/13/2024 Yes    Rash and other nonspecific skin eruption 1/13/2024 Yes    Cervical myelopathy (HCC) 1/13/2024 Yes    Ankle wound, left, initial encounter 1/13/2024 Yes    Pre-operative clearance 1/14/2024 Yes       Plan:        Cervical stenosis: Neurology and neurosurgery following case.  Neurosurgery requesting ID and cardiology clearance prior to surgical intervention.  Deemed low to intermediate risk for scheduled procedure per cardiology evaluation without further recommendations from a cardiac standpoint.  Plan for C3-T1 laminectomy and fusion today   Primary hypertension: Blood pressure stable on Coreg  Dyslipidemia: Continue statin  Chronic right ankle osteomyelitis: Hardware removal was completed on in November 2022, follows with Dr. Amado [facility].  Off antibiotics.  Infectious disease evaluated patient  Previous smoking status  GI/DVT prophylaxis: No GI prophylaxis, continue Lovenox    BRITTA Gamez - NP  1/16/2024  9:33 AM

## 2024-01-16 NOTE — PLAN OF CARE
Problem: Safety - Adult  Goal: Free from fall injury  1/16/2024 0249 by Jyoti Mraquez RN  Outcome: Progressing  1/15/2024 1731 by Ellis Aguilar RN  Outcome: Progressing     Problem: Discharge Planning  Goal: Discharge to home or other facility with appropriate resources  1/16/2024 0249 by Jyoti Marquez RN  Outcome: Progressing  1/15/2024 1731 by Ellis Aguilar RN  Outcome: Progressing     Problem: Pain  Goal: Verbalizes/displays adequate comfort level or baseline comfort level  1/16/2024 0249 by Jyoti Marquez RN  Outcome: Progressing  1/15/2024 1731 by Ellis Aguilar RN  Outcome: Progressing     Problem: Skin/Tissue Integrity  Goal: Absence of new skin breakdown  Description: 1.  Monitor for areas of redness and/or skin breakdown  2.  Assess vascular access sites hourly  3.  Every 4-6 hours minimum:  Change oxygen saturation probe site  4.  Every 4-6 hours:  If on nasal continuous positive airway pressure, respiratory therapy assess nares and determine need for appliance change or resting period.  1/16/2024 0249 by Jyoti Marquez RN  Outcome: Progressing  1/15/2024 1731 by Ellis Aguilar RN  Outcome: Progressing     Problem: ABCDS Injury Assessment  Goal: Absence of physical injury  1/16/2024 0249 by Jyoti Marquez RN  Outcome: Progressing  1/15/2024 1731 by Ellis Aguilar RN  Outcome: Progressing

## 2024-01-16 NOTE — ANESTHESIA PROCEDURE NOTES
Arterial Line:    An arterial line was placed using ultrasound guidance, in the OR for the following indication(s): continuous blood pressure monitoring and blood sampling needed.    A  (size) (length), Arrow (type) catheter was placed, Seldinger technique used, into the left radial artery, secured by Tegaderm.  Anesthesia type: General    Events:  patient tolerated procedure well with no complications.1/16/2024 4:50 PM1/16/2024 4:54 PM  Anesthesiologist: Richard Medley MD  Performed: Anesthesiologist   Preanesthetic Checklist  Completed: patient identified, IV checked, site marked, risks and benefits discussed, surgical/procedural consents, equipment checked, pre-op evaluation, timeout performed, anesthesia consent given, oxygen available, monitors applied/VS acknowledged, fire risk safety assessment completed and verbalized and blood product R/B/A discussed and consented

## 2024-01-16 NOTE — PROGRESS NOTES
[facility] -   Infectious diseases evaluation  admission date 1/12/2024    reason for consultation:   Surg clearance    Impression :   Current:  C spine stenosis w  multiple falls  S/p cervical spine surgery on 1/16  R ankle past ORIF 5/1/2022 w chronic osteomyelitis  - hardware removed 11/2022  Wound closed 2/15/23  Persistent open ankle wound  Saw Dr Amado [facility]  Bacteriae was MRSA S doxy, w a superficial pseudomonas that did not need to be treated,  - keflex x few weeks - stopped  Back w cellulitis [facility],  I/D 6/16/23 / daptomycin 4 weeks for MRSA Dr Amaod - then AB done  Then c diff 6/30/23  Still has a left ankle open wound as post op - no AB needed  Now has 1 screw  in place on the other side of the infection, and no plate- has been off AB x 7/2023  Allergy to sulfa    Other:  Smoker - stopped smoking 6/21/23  Discussion / summary of stay / plan of care     Recommendations   His should not have deep osteomyelitis remaining  in the ankle   Wound taking a long while to close - it is now 50% smaller since 7/2023 according to the pt  I believe the risk of cross infection is moderate   In order drop that risk I suggest  Keep the ankle dressed and cleaned  Pt to [facility] shower before the surgery. Daily x 3 days start 1/14  C wound  should be clean and dressed at all time till the c spine wound closes.  Mupirocin daily nostrils and axillae x 14 days  S/p cervical spine surgery on 1/16  Confusion post op  Keep wound dressed and cean      Pt worried about his teeth- lots of plaque and no abscess suspected at this time - suggested 2 x per year cleaning and good regular teeth care    Infection Control Recommendations   [place] Precautions      Antimicrobial Stewardship Recommendations   Off AB    History of Present Illness:   Initial history:  Moises Triplett is a 68 y.o.-year-old male fall at home abd could not get up-\increasing falls recently -  CT neck suggested diffuse  the Last Year: Never true   Transportation Needs: No Transportation Needs (1/8/2024)    PRAPARE - Transportation     Lack of Transportation (Medical): No     Lack of Transportation (Non-Medical): No   Physical Activity: Not on file   Stress: Not on file   Social Connections: Not on file   Intimate Partner Violence: Not on file   Housing Stability: Low Risk  (1/8/2024)    Housing Stability Vital Sign     Unable to Pay for Housing in the Last Year: No     Number of Places Lived in the Last Year: 1     Unstable Housing in the Last Year: No       Family History:   History reviewed. No pertinent family history.   Medical Decision Making:   I have independently reviewed/ordered the following labs:    CBC with Differential:   Recent Labs     01/15/24  0743 01/16/24 0441   WBC 6.1 6.6   HGB 12.7* 12.1*   HCT 40.5* 37.9*   * 434       BMP:  Recent Labs     01/15/24  0743 01/16/24 0441    135   K 4.1 4.0    103   CO2 26 22   BUN 15 15   CREATININE 0.5* 0.4*       Hepatic Function Panel: No results for input(s): \"PROT\", \"LABALBU\", \"BILIDIR\", \"IBILI\", \"BILITOT\", \"ALKPHOS\", \"ALT\", \"AST\" in the last 72 hours.  No results for input(s): \"RPR\" in the last 72 hours.  No results for input(s): \"HIV\" in the last 72 hours.  No results for input(s): \"BC\" in the last 72 hours.  Lab Results   Component Value Date/Time    CREATININE 0.4 01/16/2024 04:41 AM    GLUCOSE 85 01/16/2024 04:41 AM    GLUCOSE 100 06/12/2023 02:26 PM       Detailed results:        Thank you for allowing us to participate in the care of this patient.Please call with questions.    This note is created with the assistance of a speech recognition program.  While intending to generate adocument that actually reflects the content of the visit, the document can still have some errors including those of syntax and sound a like substitutions which may escape proof reading.  It such instances, actual meaningcan be extrapolated by contextual

## 2024-01-16 NOTE — ANESTHESIA PRE PROCEDURE
Department of Anesthesiology  Preprocedure Note       Name:  Moises Triplett   Age:  68 y.o.  :  1955                                          MRN:  [id number]         Date:  2024      Surgeon: Surgeon(s):  Angella Perales DO    Procedure: Procedure(s):  C3-T1 LAMINECTOMY AND FUSION (MEDTRONIC, C-ARM, SSEP MONTORING , PRONE ON MIZUHO TABLE, GEL ROLLS, PRETTY HEADHOLDER, NO NAVIGATION) [facility]# [id number], [place]    Medications prior to admission:   Prior to Admission medications    Medication Sig Start Date End Date Taking? Authorizing Provider   ibuprofen (ADVIL;MOTRIN) 600 MG tablet Take 1 tablet by mouth every 6 hours as needed for Pain 24   ProviderTom MD   atorvastatin (LIPITOR) 10 MG tablet Take 1 tablet by mouth at bedtime 23   ProviderTom MD       Current medications:    Current Facility-Administered Medications   Medication Dose Route Frequency Provider Last Rate Last Admin   • mupirocin (BACTROBAN) 2 % ointment   Each Nostril BID Ilene Heard MD   Given at 01/15/24 2236   • chlorhexidine (HIBICLENS) 4 % liquid   Topical Daily Ilene Heard MD   Given at 01/15/24 2236   • atorvastatin (LIPITOR) tablet 10 mg  10 mg Oral Daily Michael Grimes MD   10 mg at 01/15/24 0912   • sodium chloride flush 0.9 % injection 5-40 mL  5-40 mL IntraVENous 2 times per day Michael Grimes MD   10 mL at 01/15/24 2033   • sodium chloride flush 0.9 % injection 5-40 mL  5-40 mL IntraVENous PRN Michael Grimes MD       • 0.9 % sodium chloride infusion   IntraVENous PRN Michael Grimes MD       • potassium chloride (KLOR-CON M) extended release tablet 40 mEq  40 mEq Oral PRN Michael Grimes MD        Or   • potassium bicarb-citric acid (EFFER-K) effervescent tablet 40 mEq  40 mEq Oral PRN Michael Grimes MD        Or   • potassium chloride 10 mEq/100 mL IVPB (Peripheral Line)  10 mEq IntraVENous PRN Michael Grimes MD       • magnesium sulfate 2000 mg in 50 mL IVPB

## 2024-01-16 NOTE — PROGRESS NOTES
Neurosurgery SANTIAGO/Resident    Daily Progress Note   No chief complaint on file.    1/16/2024  7:48 AM    Chart reviewed.  No acute events overnight.  No new complaints.    Vitals:    01/15/24 2031 01/15/24 2103 01/16/24 0247 01/16/24 0742   BP: 139/84   (!) 162/100   Pulse: 86   67   Resp: 16 16 16 16   Temp: 97.6 °F (36.4 °C)   97.5 °F (36.4 °C)   TempSrc: Oral   Oral   SpO2: 98%   98%   Weight:       Height:             PE:   AOx3      Motor   L deltoid 5/5; R deltoid 5/5  L biceps 5/5; R biceps 5/5  L triceps 5/5; R triceps 5/5  L wrist extension 5/5; R wrist extension 5/5     L iliopsoas 5/5 , R iliopsoas 5/5  L quadriceps 5/5; R quadriceps 5/5  L Dorsiflexion 5/5; R dorsiflexion 5/5  L Plantarflexion 5/5; R plantarflexion 5/5  L EHL 5/5; R EHL 5/5     Drift:  absent  Tone:  normal  + Hoffmans     Sensation: intact       Lab Results   Component Value Date    WBC 6.6 01/16/2024    HGB 12.1 (L) 01/16/2024    HCT 37.9 (L) 01/16/2024     01/16/2024    ALT 29 01/08/2024    AST 39 01/08/2024     01/16/2024    K 4.0 01/16/2024     01/16/2024    CREATININE 0.4 (L) 01/16/2024    BUN 15 01/16/2024    CO2 22 01/16/2024    INR 0.97 06/12/2023    CRP 3.4 01/12/2024    SEDRATE 8 01/12/2024       Radiology   XR CERVICAL SPINE FLEXION AND EXTENSION    Result Date: 1/14/2024  EXAMINATION: 4 XRAY VIEWS OF THE CERVICAL SPINE 1/14/2024 3:32 pm COMPARISON: MRI 01/11/2024 and radiographs 01/13/2024. HISTORY: ORDERING SYSTEM PROVIDED HISTORY: assess stability please do with swimmers view TECHNOLOGIST PROVIDED HISTORY: assess stability please do with swimmers view Reason for Exam: eval stability, upright, best images with patient relaxed and arms hanging FINDINGS: Grade 1 anterolisthesis at C3-C4 in neutral does not change significantly with flexion or extension.  There is 3 mm of anterolisthesis at C7-T1 in neutral.  The spine is only visible to the inferior aspect of C7 on the flexion view.  Anterolisthesis appears

## 2024-01-17 ENCOUNTER — APPOINTMENT (OUTPATIENT)
Dept: CT IMAGING | Age: 69
DRG: 472 | End: 2024-01-17
Attending: INTERNAL MEDICINE
Payer: MEDICARE

## 2024-01-17 ENCOUNTER — APPOINTMENT (OUTPATIENT)
Dept: GENERAL RADIOLOGY | Age: 69
DRG: 472 | End: 2024-01-17
Attending: INTERNAL MEDICINE
Payer: MEDICARE

## 2024-01-17 LAB
ALBUMIN SERPL-MCNC: 3.3 G/DL (ref 3.5–5.2)
ALBUMIN/GLOB SERPL: 1.6 {RATIO} (ref 1–2.5)
ALP SERPL-CCNC: 97 U/L (ref 40–129)
ALT SERPL-CCNC: 14 U/L (ref 5–41)
ANION GAP SERPL CALCULATED.3IONS-SCNC: 9 MMOL/L (ref 9–17)
AST SERPL-CCNC: 19 U/L
BASOPHILS # BLD: <0.03 K/UL (ref 0–0.2)
BASOPHILS NFR BLD: 0 % (ref 0–2)
BILIRUB SERPL-MCNC: 0.2 MG/DL (ref 0.3–1.2)
BUN SERPL-MCNC: 17 MG/DL (ref 8–23)
CALCIUM SERPL-MCNC: 8.1 MG/DL (ref 8.6–10.4)
CHLORIDE SERPL-SCNC: 102 MMOL/L (ref 98–107)
CO2 SERPL-SCNC: 23 MMOL/L (ref 20–31)
CREAT SERPL-MCNC: 0.7 MG/DL (ref 0.7–1.2)
EOSINOPHIL # BLD: <0.03 K/UL (ref 0–0.44)
EOSINOPHILS RELATIVE PERCENT: 0 % (ref 1–4)
ERYTHROCYTE [DISTWIDTH] IN BLOOD BY AUTOMATED COUNT: 18.8 % (ref 11.8–14.4)
GFR SERPL CREATININE-BSD FRML MDRD: >60 ML/MIN/1.73M2
GLUCOSE SERPL-MCNC: 109 MG/DL (ref 70–99)
HCT VFR BLD AUTO: 33.1 % (ref 40.7–50.3)
HGB BLD-MCNC: 10.6 G/DL (ref 13–17)
IMM GRANULOCYTES # BLD AUTO: 0.05 K/UL (ref 0–0.3)
IMM GRANULOCYTES NFR BLD: 1 %
LYMPHOCYTES NFR BLD: 0.79 K/UL (ref 1.1–3.7)
LYMPHOCYTES RELATIVE PERCENT: 9 % (ref 24–43)
MCH RBC QN AUTO: 26.1 PG (ref 25.2–33.5)
MCHC RBC AUTO-ENTMCNC: 32 G/DL (ref 28.4–34.8)
MCV RBC AUTO: 81.5 FL (ref 82.6–102.9)
MICROORGANISM SPEC CULT: NO GROWTH
MONOCYTES NFR BLD: 0.48 K/UL (ref 0.1–1.2)
MONOCYTES NFR BLD: 5 % (ref 3–12)
NEUTROPHILS NFR BLD: 85 % (ref 36–65)
NEUTS SEG NFR BLD: 7.57 K/UL (ref 1.5–8.1)
NRBC BLD-RTO: 0 PER 100 WBC
PLATELET # BLD AUTO: 428 K/UL (ref 138–453)
PMV BLD AUTO: 9.2 FL (ref 8.1–13.5)
POTASSIUM SERPL-SCNC: 4.5 MMOL/L (ref 3.7–5.3)
PROT SERPL-MCNC: 5.4 G/DL (ref 6.4–8.3)
RBC # BLD AUTO: 4.06 M/UL (ref 4.21–5.77)
RBC # BLD: ABNORMAL 10*6/UL
SODIUM SERPL-SCNC: 134 MMOL/L (ref 135–144)
SPECIMEN DESCRIPTION: NORMAL
WBC OTHER # BLD: 8.9 K/UL (ref 3.5–11.3)

## 2024-01-17 PROCEDURE — 97167 OT EVAL HIGH COMPLEX 60 MIN: CPT

## 2024-01-17 PROCEDURE — 6370000000 HC RX 637 (ALT 250 FOR IP): Performed by: PHYSICIAN ASSISTANT

## 2024-01-17 PROCEDURE — 97530 THERAPEUTIC ACTIVITIES: CPT

## 2024-01-17 PROCEDURE — 1200000000 HC SEMI PRIVATE

## 2024-01-17 PROCEDURE — 6360000002 HC RX W HCPCS: Performed by: PHYSICIAN ASSISTANT

## 2024-01-17 PROCEDURE — 99232 SBSQ HOSP IP/OBS MODERATE 35: CPT | Performed by: INTERNAL MEDICINE

## 2024-01-17 PROCEDURE — 6370000000 HC RX 637 (ALT 250 FOR IP): Performed by: NURSE PRACTITIONER

## 2024-01-17 PROCEDURE — 6370000000 HC RX 637 (ALT 250 FOR IP): Performed by: INTERNAL MEDICINE

## 2024-01-17 PROCEDURE — 72040 X-RAY EXAM NECK SPINE 2-3 VW: CPT

## 2024-01-17 PROCEDURE — 80053 COMPREHEN METABOLIC PANEL: CPT

## 2024-01-17 PROCEDURE — 36415 COLL VENOUS BLD VENIPUNCTURE: CPT

## 2024-01-17 PROCEDURE — 6360000002 HC RX W HCPCS: Performed by: INTERNAL MEDICINE

## 2024-01-17 PROCEDURE — 2580000003 HC RX 258: Performed by: PHYSICIAN ASSISTANT

## 2024-01-17 PROCEDURE — 97162 PT EVAL MOD COMPLEX 30 MIN: CPT

## 2024-01-17 PROCEDURE — 72125 CT NECK SPINE W/O DYE: CPT

## 2024-01-17 PROCEDURE — 6360000002 HC RX W HCPCS: Performed by: NURSE PRACTITIONER

## 2024-01-17 PROCEDURE — 70450 CT HEAD/BRAIN W/O DYE: CPT

## 2024-01-17 PROCEDURE — 85025 COMPLETE CBC W/AUTO DIFF WBC: CPT

## 2024-01-17 PROCEDURE — 99232 SBSQ HOSP IP/OBS MODERATE 35: CPT | Performed by: NURSE PRACTITIONER

## 2024-01-17 PROCEDURE — 6360000002 HC RX W HCPCS

## 2024-01-17 RX ORDER — OXYCODONE HYDROCHLORIDE 5 MG/1
5 TABLET ORAL EVERY 6 HOURS PRN
Status: DISCONTINUED | OUTPATIENT
Start: 2024-01-17 | End: 2024-01-23 | Stop reason: HOSPADM

## 2024-01-17 RX ORDER — FAMOTIDINE 20 MG/1
20 TABLET, FILM COATED ORAL DAILY
Status: DISCONTINUED | OUTPATIENT
Start: 2024-01-17 | End: 2024-01-23 | Stop reason: HOSPADM

## 2024-01-17 RX ORDER — HYDROXYZINE HYDROCHLORIDE 50 MG/ML
50 INJECTION, SOLUTION INTRAMUSCULAR EVERY 6 HOURS PRN
Status: DISCONTINUED | OUTPATIENT
Start: 2024-01-17 | End: 2024-01-23 | Stop reason: HOSPADM

## 2024-01-17 RX ORDER — OXYCODONE HYDROCHLORIDE 5 MG/1
10 TABLET ORAL EVERY 6 HOURS PRN
Status: DISCONTINUED | OUTPATIENT
Start: 2024-01-17 | End: 2024-01-23 | Stop reason: HOSPADM

## 2024-01-17 RX ORDER — DIPHENHYDRAMINE HYDROCHLORIDE 50 MG/ML
25 INJECTION INTRAMUSCULAR; INTRAVENOUS ONCE
Status: COMPLETED | OUTPATIENT
Start: 2024-01-17 | End: 2024-01-17

## 2024-01-17 RX ORDER — LORAZEPAM 2 MG/ML
2 INJECTION INTRAMUSCULAR
Status: COMPLETED | OUTPATIENT
Start: 2024-01-17 | End: 2024-01-17

## 2024-01-17 RX ORDER — CARVEDILOL 6.25 MG/1
6.25 TABLET ORAL 2 TIMES DAILY WITH MEALS
Status: DISCONTINUED | OUTPATIENT
Start: 2024-01-17 | End: 2024-01-23 | Stop reason: HOSPADM

## 2024-01-17 RX ADMIN — MUPIROCIN: 20 OINTMENT TOPICAL at 21:48

## 2024-01-17 RX ADMIN — ACETAMINOPHEN 325MG 650 MG: 325 TABLET ORAL at 14:32

## 2024-01-17 RX ADMIN — Medication 2000 MG: at 14:34

## 2024-01-17 RX ADMIN — ACETAMINOPHEN 325MG 650 MG: 325 TABLET ORAL at 21:18

## 2024-01-17 RX ADMIN — METHOCARBAMOL TABLETS 500 MG: 500 TABLET, COATED ORAL at 00:31

## 2024-01-17 RX ADMIN — Medication 2000 MG: at 05:30

## 2024-01-17 RX ADMIN — ACETAMINOPHEN 325MG 650 MG: 325 TABLET ORAL at 00:31

## 2024-01-17 RX ADMIN — ACETAMINOPHEN 325MG 650 MG: 325 TABLET ORAL at 09:09

## 2024-01-17 RX ADMIN — Medication 1 MG: at 03:10

## 2024-01-17 RX ADMIN — METHOCARBAMOL TABLETS 500 MG: 500 TABLET, COATED ORAL at 21:21

## 2024-01-17 RX ADMIN — SODIUM CHLORIDE, PRESERVATIVE FREE 10 ML: 5 INJECTION INTRAVENOUS at 21:22

## 2024-01-17 RX ADMIN — HYDROMORPHONE HYDROCHLORIDE 1 MG: 1 INJECTION, SOLUTION INTRAMUSCULAR; INTRAVENOUS; SUBCUTANEOUS at 03:10

## 2024-01-17 RX ADMIN — METHOCARBAMOL TABLETS 500 MG: 500 TABLET, COATED ORAL at 14:29

## 2024-01-17 RX ADMIN — CARVEDILOL 6.25 MG: 6.25 TABLET, FILM COATED ORAL at 17:48

## 2024-01-17 RX ADMIN — SODIUM CHLORIDE, PRESERVATIVE FREE 10 ML: 5 INJECTION INTRAVENOUS at 22:39

## 2024-01-17 RX ADMIN — OXYCODONE HYDROCHLORIDE 10 MG: 5 TABLET ORAL at 22:42

## 2024-01-17 RX ADMIN — SODIUM CHLORIDE, PRESERVATIVE FREE 10 ML: 5 INJECTION INTRAVENOUS at 22:41

## 2024-01-17 RX ADMIN — POLYETHYLENE GLYCOL 3350 17 G: 17 POWDER, FOR SOLUTION ORAL at 09:28

## 2024-01-17 RX ADMIN — OXYCODONE HYDROCHLORIDE 10 MG: 5 TABLET ORAL at 14:29

## 2024-01-17 RX ADMIN — DIPHENHYDRAMINE HYDROCHLORIDE 25 MG: 50 INJECTION INTRAMUSCULAR; INTRAVENOUS at 00:31

## 2024-01-17 RX ADMIN — ATORVASTATIN CALCIUM 10 MG: 10 TABLET, FILM COATED ORAL at 09:30

## 2024-01-17 RX ADMIN — MUPIROCIN: 20 OINTMENT TOPICAL at 09:30

## 2024-01-17 RX ADMIN — SODIUM CHLORIDE: 9 INJECTION, SOLUTION INTRAVENOUS at 02:24

## 2024-01-17 RX ADMIN — METHOCARBAMOL TABLETS 500 MG: 500 TABLET, COATED ORAL at 09:29

## 2024-01-17 RX ADMIN — Medication 2000 MG: at 21:20

## 2024-01-17 RX ADMIN — FAMOTIDINE 20 MG: 20 TABLET, FILM COATED ORAL at 09:28

## 2024-01-17 RX ADMIN — FAMOTIDINE 20 MG: 20 TABLET, FILM COATED ORAL at 00:31

## 2024-01-17 RX ADMIN — LORAZEPAM 2 MG: 2 INJECTION INTRAMUSCULAR; INTRAVENOUS at 04:17

## 2024-01-17 RX ADMIN — CARVEDILOL 6.25 MG: 6.25 TABLET, FILM COATED ORAL at 04:17

## 2024-01-17 ASSESSMENT — PAIN - FUNCTIONAL ASSESSMENT
PAIN_FUNCTIONAL_ASSESSMENT: PREVENTS OR INTERFERES SOME ACTIVE ACTIVITIES AND ADLS

## 2024-01-17 ASSESSMENT — PAIN DESCRIPTION - LOCATION
LOCATION: BACK;NECK;SHOULDER
LOCATION: NECK
LOCATION: NECK
LOCATION: BACK;NECK;SHOULDER

## 2024-01-17 ASSESSMENT — PAIN DESCRIPTION - DESCRIPTORS
DESCRIPTORS: ACHING
DESCRIPTORS: SHOOTING
DESCRIPTORS: ACHING
DESCRIPTORS: STABBING

## 2024-01-17 ASSESSMENT — PAIN SCALES - GENERAL
PAINLEVEL_OUTOF10: 3
PAINLEVEL_OUTOF10: 9
PAINLEVEL_OUTOF10: 8

## 2024-01-17 ASSESSMENT — PAIN DESCRIPTION - PAIN TYPE: TYPE: CHRONIC PAIN

## 2024-01-17 ASSESSMENT — PAIN DESCRIPTION - ONSET: ONSET: ON-GOING

## 2024-01-17 ASSESSMENT — PAIN DESCRIPTION - FREQUENCY: FREQUENCY: CONTINUOUS

## 2024-01-17 ASSESSMENT — PAIN DESCRIPTION - ORIENTATION: ORIENTATION: RIGHT

## 2024-01-17 NOTE — PROGRESS NOTES
Occupational Therapy  Facility/Department: [address] ORTHO/MED SURG  Occupational Therapy Initial Assessment    Name: Moises Triplett  : 1955  MRN: [id number]  Date of Service: 2024    Discharge Recommendations:   Further therapy recommended at discharge.The patient should be able to tolerate at least 3 hours of therapy per day over 5 days or 15 hours over 7 days, if cognition improves.   This patient may benefit from a Physical Medicine and Rehab consult.               Patient Diagnosis(es): The encounter diagnosis was Cervical stenosis of spinal canal.  Past Medical History:  has no past medical history on file.  Past Surgical History:  has a past surgical history that includes laminectomy (2024).           Assessment   Performance deficits / Impairments: Decreased functional mobility ;Decreased ADL status;Decreased endurance;Decreased high-level IADLs;Decreased strength;Decreased ROM;Decreased balance;Decreased cognition;Decreased safe awareness;Decreased coordination  Assessment: pt demonstrated above deficits impacting occupational performance. pt significantly limited by cognition on this date, following ~5 commands during session with max verbal and tactile cues. pt is unsafe to return to prior living arrangement at this time d/t decreased cognitionand requring heavy physical assistance for all movement. pt would benefit from continued OT at discharge in order to increase ability to participate in meaningful tasks.  Prognosis: Fair  Decision Making: High Complexity  REQUIRES OT FOLLOW-UP: Yes  Activity Tolerance  Activity Tolerance: Treatment limited secondary to decreased cognition  Activity Tolerance Comments: pt exhibited very limitedcommand following throughout session limiting all participation on this date        Plan   Occupational Therapy Plan  Times Per Week: 3-5x/wk     Restrictions  Restrictions/Precautions  Restrictions/Precautions: Fall Risk, Weight Bearing  Required Braces or

## 2024-01-17 NOTE — ANESTHESIA POSTPROCEDURE EVALUATION
Department of Anesthesiology  Postprocedure Note    Patient: Moises Triplett  MRN: [id number]  YOB: 1955  Date of evaluation: 1/16/2024    Procedure Summary     Date: 01/16/24 Room / Location: [address]    Anesthesia Start: 1635 Anesthesia Stop: 2215    Procedure: C3-C6 LAMINECTOMY, C3-T1 FUSION (Neck) Diagnosis:       Cervical stenosis of spinal canal      (Cervical stenosis of spinal canal [M48.02])    Surgeons: Angella Perales DO Responsible Provider: Richard Medley MD    Anesthesia Type: general ASA Status: 2          Anesthesia Type: No value filed.    Uli Phase I: Uli Score: 9    Uli Phase II:      Anesthesia Post Evaluation    Patient location during evaluation: bedside  Patient participation: complete - patient participated  Level of consciousness: awake  Airway patency: patent  Nausea & Vomiting: no nausea and no vomiting  Cardiovascular status: hemodynamically stable  Respiratory status: acceptable  Hydration status: stable  Comments: BP (!) 167/95   Pulse 91   Temp 97.2 °F (36.2 °C) (Temporal)   Resp 17   Ht 1.727 m (5' 8\")   Wt 57 kg (125 lb 10.6 oz)   SpO2 97%   BMI 19.11 kg/m²           No notable events documented.

## 2024-01-17 NOTE — CARE COORDINATION
Reevaluated patient in room, speech is clear and appropriate, he is A & O X3, cervical collar off while in bed. Cth reviewed negative for acute findings. IVF discontinued. No more phuc Richards

## 2024-01-17 NOTE — OP NOTE
Operative Note      Patient: Moises Triplett  YOB: 1955  MRN: [id number]    Date of Procedure: 1/16/2024    Pre-Op Diagnosis Codes:     * Cervical stenosis of spinal canal [M48.02]    Post-Op Diagnosis: Same       Procedure(s):  POSTERIOR CERVICAL LAMINECTOMY C3-6  SEGMENTAL FIXATION SCREWS C3-T1  ARTHRODESIS OF C3-T1      USE OF MORSELIZED ALLOGRAFT AND AUTOGRAFT  USE OF FLUOROSCOPY   NEUROMONITERING WITH SSEP/GIOVANNI    Modifier 22: increased complexity of surgery due to  significant spondylosis requiring 1.5 extra hours for placement of facet screw and contorting of rods    Surgeon(s):  Angella Perales DO    Assistant:   Physician Assistant: Yony Verduzco PA-C  Assisted in positioning, the core of the procedure including suction and retraction, and closure.     Anesthesia: General    Estimated Blood Loss (mL): 75    Complications: None    Specimens:   ID Type Source Tests Collected by Time Destination   1 : urine for culture Urine Bladder CULTURE, URINE Angella Perales DO 1/16/2024 2113        Implants:  Implant Name Type Inv. Item Serial No.  Lot No. LRB No. Used Action   SCREW 30MM MULTI AXIAL X - [id number]  SCREW 30MM MULTI AXIAL X  [employer]-  N/A 2 Implanted   GRAFT CELLR BNE MATRX INFLUX SPARC 5CC - [id number]  GRAFT CELLR BNE MATRX INFLUX SPARC 5C [id number] [employer] [id number] N/A 1 Implanted   GRAFT CELLR BNE MATRX INFLUX SPARC 5CC - [id number]  GRAFT CELLR BNE MATRX INFLUX SPARC 5C [id number] [employer] [id number] N/A 1 Implanted   GRAFT CELLR BNE MATRX INFLUX SPARC 5CC - [id number]  GRAFT CELLR BNE MATRX INFLUX SPARC 5C [id number] [employer] [id number] N/A 1 Implanted   GRAFT CELLR BNE MATRX INFLUX SPARC 5CC - [id number]  GRAFT CELLR BNE MATRX INFLUX SPARC 5C [id number] [employer] [id number] N/A 1 Implanted   SCREW SPNL L12MM DIA3.5MM OCCIPITOCERVICAL UP THOR - [id number]  SCREW SPNL L12MM DIA3.5MM OCCIPITOCERVICAL  diameter rods were  minimally bent and placed on the screw heads and set screws were secured. Due to significant spondylosis and uneven facet, it was difficult contour irene to fit the screw heads. Fluoroscopy was required to help guide screw as the anatomy was abnormal.  The right C5 screw was removed. The facet and joint space were burred.  The wound was thoroughly irrigated and hemostasis obtained.  Morselized autologous bone influx DBX and stem cell combo was placed in the facet joints and  lateral gutters.  This completes the fusion process.     Final hemostasis was obtained after thorough irrigation with bipolar cautery. Exparel was infiltrated in the subcutaneous and muscle layers. Hemostasis was ensured with bipolar cautery. A 10 [place] Hemovac drain was placed in subfascial space.  Vancomycin powder was placed in the subfascial and subcutaneous space.   Wound was closed in multiple layers with reverse interrupted 3-0 vicrals  at the last layer and the skin was dressed with dermabond. The oswald was then disconnected from the table.  Patient was turned supine onto a hospital bed, and the Oswald was removed.  Patient was extubated and recovered in the PACU without complications. Final xray shows good position of instrumentation.      Instrument, sponge, and needle counts were correct prior to wound closure and at the conclusion of the case. MEP had slight improvement.       Electronically signed by Angella Preales DO on 1/16/2024 at 9:50 PM

## 2024-01-17 NOTE — PROGRESS NOTES
Neurosurgery [facility]/Resident    Daily Progress Note   No chief complaint on file.    1/17/2024  9:37 AM    Chart reviewed.  No new complaints. Had some delirium overnight so was given 2mg of Ativan from internal medicine. Patient very drowsy and difficult to arouse on assessment.     Vitals:    01/17/24 0345 01/17/24 0417 01/17/24 0545 01/17/24 0716   BP: (!) 177/125 (!) 177/120 109/73 113/79   Pulse: (!) 104  66 59   Resp: 21      Temp: 98.6 °F (37 °C)      TempSrc: Axillary      SpO2: 100%  99% 99%   Weight:       Height:             PE:   AOx3      Motor   L deltoid 5/5; R deltoid 5/5  L biceps 5/5; R biceps 5/5  L triceps 5/5; R triceps 5/5  L wrist extension 5/5; R wrist extension 5/5     L iliopsoas 5/5 , R iliopsoas 5/5  L quadriceps 5/5; R quadriceps 5/5  L Dorsiflexion 5/5; R dorsiflexion 5/5  L Plantarflexion 5/5; R plantarflexion 5/5  L EHL 5/5; R EHL 5/5     Drift:  absent  Tone:  normal  + Hoffmans     Incision: dressing clean/dry/intact  Hemovac: 150mL/12 hours      Lab Results   Component Value Date    WBC 8.9 01/17/2024    HGB 10.6 (L) 01/17/2024    HCT 33.1 (L) 01/17/2024     01/17/2024    ALT 14 01/17/2024    AST 19 01/17/2024     (L) 01/17/2024    K 4.5 01/17/2024     01/17/2024    CREATININE 0.7 01/17/2024    BUN 17 01/17/2024    CO2 23 01/17/2024    INR 1.0 01/16/2024    CRP 3.4 01/12/2024    SEDRATE 8 01/12/2024       A/P  68 y.o. male who presents with cervical stenosis of spinal canal.  POD #1 s/p c3-c6 laminectomy, C3-T1 fusion        - X-ray cervical pending   - CT cervical pending   - C-Collar when OOB   - Okay for diet   - Okay for PT/OT   - Continue with pain management   - Maintain drain and document output      Please contact neurosurgery with any changes in patients neurologic status.   Electronically signed by BRITTA Carlos CNP on 1/17/2024 at 9:40 AM

## 2024-01-17 NOTE — BRIEF OP NOTE
Brief Postoperative Note      Patient: Moises Triplett  YOB: 1955  MRN: [id number]    Date of Procedure: 1/16/2024    Pre-Op Diagnosis Codes:     * Cervical stenosis of spinal canal [M48.02]    Post-Op Diagnosis: Same       Procedure(s):  C3-C6 LAMINECTOMY, C3-T1 FUSION    Surgeon(s):  Angella Perales DO    Assistant:  Physician Assistant: Yony Verduzco PA-C    Anesthesia: General    Estimated Blood Loss (mL): 75    Complications: None    Specimens:   ID Type Source Tests Collected by Time Destination   1 : urine for culture Urine Bladder CULTURE, URINE Angella Perales DO 1/16/2024 2113        Implants:  Implant Name Type Inv. Item Serial No.  Lot No. LRB No. Used Action   SCREW 30MM MULTI AXIAL X - [id number]  SCREW 30MM MULTI AXIAL X  [employer]WD  N/A 2 Implanted   GRAFT CELLR BNE MATRX INFLUX SPARC 5CC - [id number]  GRAFT CELLR BNE MATRX INFLUX SPARC 5CC [id number] [employer] [id number] N/A 1 Implanted   GRAFT CELLR BNE MATRX INFLUX SPARC 5CC - [id number]  GRAFT CELLR BNE MATRX INFLUX SPARC 5CC [id number] [employer] [id number] N/A 1 Implanted   GRAFT CELLR BNE MATRX INFLUX SPARC 5CC - [id number]  GRAFT CELLR BNE MATRX INFLUX SPARC 5CC [id number] [employer] [id number] N/A 1 Implanted   GRAFT CELLR BNE MATRX INFLUX SPARC 5CC - [id number]  GRAFT CELLR BNE MATRX INFLUX SPARC 5CC [id number] [employer] [id number] N/A 1 Implanted   SCREW SPNL L12MM DIA3.5MM OCCIPITOCERVICAL UP THOR - [id number]  SCREW SPNL L12MM DIA3.5MM OCCIPITOCERVICAL UP THOR  [employer] DANE[employer]WD  N/A 5 Implanted   SCREW SPNL L14MM DIA3.5MM OCCIPITOCERVICAL UP THOR - [id number]  SCREW SPNL L14MM DIA3.5MM OCCIPITOCERVICAL UP THOR  [employer] DANEK-WD  N/A 2 Implanted   JEN SPNL L60MM DIA3.5MM OCCIPITOCERVICAL UP THOR PRECUT - [id number]  JEN SPNL L60MM DIA3.5MM OCCIPITOCERVICAL UP THOR PRECUT  [id number]  N/A 1 Implanted   SET SCREW SPINAL  M6 - [id number]  SET SCREW SPINAL M6  [employer] SOFAMOR DANEK-WD  N/A 9 Implanted         Drains:   Closed/Suction Drain Posterior Neck Accordion (Active)       Urinary Catheter 01/16/24 [place] (Active)       Findings: successful decompression and fusion      Electronically signed by Angella Perales DO on 1/16/2024 at 9:50 PM

## 2024-01-17 NOTE — PROGRESS NOTES
Physical Therapy  Facility/Department: [address] ORTHO/MED SURG  Physical Therapy Initial Assessment    Name: Moises Triplett  : 1955  MRN: [id number]  Date of Service: 2024    Discharge Recommendations:  Patient would benefit from continued therapy after discharge          Patient Diagnosis(es): The encounter diagnosis was Cervical stenosis of spinal canal.  Past Medical History:  has no past medical history on file.  Past Surgical History:  has a past surgical history that includes laminectomy (2024).    Assessment   Body Structures, Functions, Activity Limitations Requiring Skilled Therapeutic Intervention: Decreased functional mobility ;Decreased safe awareness;Decreased sensation;Decreased balance;Increased pain  Assessment: The pt required max assist x 2 for transfer supine to sit. He was confused throughout the eval process. He was inconsistent in following commands. He could benefit from a continuation of PT for gait and strengthening following his DC  Therapy Prognosis: Good  Decision Making: Medium Complexity  Requires PT Follow-Up: Yes  Activity Tolerance  Activity Tolerance: Patient limited by endurance;Patient limited by pain     Plan   Physical Therapy Plan  Current Treatment Recommendations: Balance training, Functional mobility training, Transfer training, Gait training, Pain management, Safety education & training, Therapeutic activities, Wheelchair mobility training  Safety Devices  Type of Devices: Call light within reach, Left in bed, Nurse notified, Bed alarm in place, Patient at risk for falls  Restraints  Restraints Initially in Place: No     Restrictions  Restrictions/Precautions  Restrictions/Precautions: Fall Risk, Weight Bearing  Required Braces or Orthoses?: Yes  Upper Extremity Weight Bearing Restrictions  Right Upper Extremity Weight Bearing: Non Weight Bearing  Required Braces or Orthoses  Cervical: c-collar  Right Upper Extremity Brace/Splint: Sling  Position Activity

## 2024-01-17 NOTE — PROGRESS NOTES
[facility]  Office: [phone]  Chapin Muse DO, Cyrus Dunn DO, Franky Jules DO, Danny Sexton DO, Luis Angel Graica MD, Julee Quiñones MD, Delroy Landeros MD, Madison Collier MD,  Tony Rosales MD, Joni Dubois MD, Soila Oliva MD,  Neftali Larkin DO, Alvaro Maria MD, Juan Luis Faustin MD, Torin Muse DO, Eden Barajas MD,  Marquis Lemus DO, Yarely Molina MD, Maya West MD, Karen Adler MD, Shyam Beckman MD,  Rojas Neri MD, Axel Tello MD, Ryan Batista MD, Tima Hoffmann MD, Michael Grimes MD, Angus Hunt MD, Ramiro Burns DO, Bhupinder Mao DO, Mari Villasenor MD,  Davion Graves MD, Shirley Waterhouse, CNP,  Alycia De La Fuente CNP, Tavon Watkins, CNP,  Katharine Merrill, LACHO, Laura Small, CNP, Brenda Jade, CNP, Malena Rossi CNP, Marya Watson, CNP, Kortney Wilson, CNP, Zabrina Obrien, PA-C, Soila Barber PA-C, Tia Mohan, CNP, Betina Saldivar, CNS, Carmen Worley, CNP, Mamie Crain, CNP, Tracy Schwab, CNP         [facility]   IN-PATIENT SERVICE   [facility]    Progress Note    1/16/2024    9:33 AM    Name:   Moises Triplett  MRN:     [id number]     Acct:      [id number]   Room:   [facility]6/0246-01   Day:  4  Admit Date:  1/12/2024  5:47 PM    PCP:   Amparo Bradford APRN - NP  Code Status:  Full Code    Subjective:     C/C: Right shoulder pain, frequent falls    Interval History Status: not changed.     Patient seen and evaluated earlier this morning.  Messaged by nursing that patient was groggy and not making a lot of sense.  Patient is postop coming out of anesthesia from yesterday.  He received fentanyl, Ativan, Benadryl.  He has not been sleeping well.    Postop day 1 from C3-6 laminectomy and C3-T1 fusion    Brief History:     Moises Triplett is a 68 y.o. Non- / non  male who initially presented to [place] ED 1/8/24 for frequent falls. Patient has a medical history significant for hyperlipidemia and  disease without acute intracranial abnormality.     XR SHOULDER RIGHT (MIN 2 VIEWS)    Result Date: 1/8/2024  Right shoulder: 1. Suspected nondisplaced fracture at the junction of the acromion and scapula. 2. Severe right glenohumeral osteoarthrosis. 3. Moderate to severe degenerative changes of the right AC joint. Right knee: 1. Mild degenerative changes of the medial compartment. 2. No acute fracture or dislocation. Left knee: 1. Mild degenerative change of the medial compartment. 2. No acute fracture or dislocation.     XR KNEE LEFT (3 VIEWS)    Result Date: 1/8/2024  Right shoulder: 1. Suspected nondisplaced fracture at the junction of the acromion and scapula. 2. Severe right glenohumeral osteoarthrosis. 3. Moderate to severe degenerative changes of the right AC joint. Right knee: 1. Mild degenerative changes of the medial compartment. 2. No acute fracture or dislocation. Left knee: 1. Mild degenerative change of the medial compartment. 2. No acute fracture or dislocation.     XR KNEE RIGHT (3 VIEWS)    Result Date: 1/8/2024  Right shoulder: 1. Suspected nondisplaced fracture at the junction of the acromion and scapula. 2. Severe right glenohumeral osteoarthrosis. 3. Moderate to severe degenerative changes of the right AC joint. Right knee: 1. Mild degenerative changes of the medial compartment. 2. No acute fracture or dislocation. Left knee: 1. Mild degenerative change of the medial compartment. 2. No acute fracture or dislocation.     XR CHEST PORTABLE    Result Date: 1/8/2024  No acute cardiopulmonary process.     CT CERVICAL SPINE WO CONTRAST    Result Date: 1/8/2024  1. No definite acute osseous abnormality of the cervical spine. 2. Extensive multilevel DJD/DDD changes, slightly increased as compared to 2021 with associated findings, as above.     CT Head W/O Contrast    Result Date: 1/8/2024  No acute intracranial abnormality.       Physical Examination:        General appearance:  alert, cooperative and

## 2024-01-18 LAB
ANION GAP SERPL CALCULATED.3IONS-SCNC: 10 MMOL/L (ref 9–17)
BASOPHILS # BLD: 0 K/UL (ref 0–0.2)
BASOPHILS NFR BLD: 0 % (ref 0–2)
BUN SERPL-MCNC: 10 MG/DL (ref 8–23)
CALCIUM SERPL-MCNC: 8.8 MG/DL (ref 8.6–10.4)
CHLORIDE SERPL-SCNC: 98 MMOL/L (ref 98–107)
CO2 SERPL-SCNC: 26 MMOL/L (ref 20–31)
CREAT SERPL-MCNC: 0.4 MG/DL (ref 0.7–1.2)
EOSINOPHIL # BLD: 0 K/UL (ref 0–0.44)
EOSINOPHILS RELATIVE PERCENT: 0 % (ref 1–4)
ERYTHROCYTE [DISTWIDTH] IN BLOOD BY AUTOMATED COUNT: 20.7 % (ref 11.8–14.4)
GFR SERPL CREATININE-BSD FRML MDRD: >60 ML/MIN/1.73M2
GLUCOSE SERPL-MCNC: 116 MG/DL (ref 70–99)
HCT VFR BLD AUTO: 32.2 % (ref 40.7–50.3)
HGB BLD-MCNC: 11.6 G/DL (ref 13–17)
IMM GRANULOCYTES # BLD AUTO: 0 K/UL (ref 0–0.3)
IMM GRANULOCYTES NFR BLD: 0 %
LYMPHOCYTES NFR BLD: 1.28 K/UL (ref 1.1–3.7)
LYMPHOCYTES RELATIVE PERCENT: 16 % (ref 24–43)
MCH RBC QN AUTO: 29.1 PG (ref 25.2–33.5)
MCHC RBC AUTO-ENTMCNC: 36 G/DL (ref 28.4–34.8)
MCV RBC AUTO: 80.7 FL (ref 82.6–102.9)
MONOCYTES NFR BLD: 0.8 K/UL (ref 0.1–1.2)
MONOCYTES NFR BLD: 10 % (ref 3–12)
MORPHOLOGY: ABNORMAL
MORPHOLOGY: ABNORMAL
NEUTROPHILS NFR BLD: 74 % (ref 36–65)
NEUTS SEG NFR BLD: 5.92 K/UL (ref 1.5–8.1)
NRBC BLD-RTO: 0 PER 100 WBC
PLATELET # BLD AUTO: ABNORMAL K/UL (ref 138–453)
PLATELET, FLUORESCENCE: 524 K/UL (ref 138–453)
PLATELETS.RETICULATED NFR BLD AUTO: 1.7 % (ref 1.1–10.3)
POTASSIUM SERPL-SCNC: 3.9 MMOL/L (ref 3.7–5.3)
RBC # BLD AUTO: 3.99 M/UL (ref 4.21–5.77)
SODIUM SERPL-SCNC: 134 MMOL/L (ref 135–144)
WBC OTHER # BLD: 8 K/UL (ref 3.5–11.3)

## 2024-01-18 PROCEDURE — 6360000002 HC RX W HCPCS: Performed by: PHYSICIAN ASSISTANT

## 2024-01-18 PROCEDURE — 1200000000 HC SEMI PRIVATE

## 2024-01-18 PROCEDURE — 2580000003 HC RX 258: Performed by: PHYSICIAN ASSISTANT

## 2024-01-18 PROCEDURE — 97530 THERAPEUTIC ACTIVITIES: CPT

## 2024-01-18 PROCEDURE — 6370000000 HC RX 637 (ALT 250 FOR IP): Performed by: INTERNAL MEDICINE

## 2024-01-18 PROCEDURE — 99232 SBSQ HOSP IP/OBS MODERATE 35: CPT | Performed by: INTERNAL MEDICINE

## 2024-01-18 PROCEDURE — 85055 RETICULATED PLATELET ASSAY: CPT

## 2024-01-18 PROCEDURE — 80048 BASIC METABOLIC PNL TOTAL CA: CPT

## 2024-01-18 PROCEDURE — 36415 COLL VENOUS BLD VENIPUNCTURE: CPT

## 2024-01-18 PROCEDURE — 6370000000 HC RX 637 (ALT 250 FOR IP): Performed by: PHYSICIAN ASSISTANT

## 2024-01-18 PROCEDURE — 6370000000 HC RX 637 (ALT 250 FOR IP): Performed by: NURSE PRACTITIONER

## 2024-01-18 PROCEDURE — 85025 COMPLETE CBC W/AUTO DIFF WBC: CPT

## 2024-01-18 PROCEDURE — 97535 SELF CARE MNGMENT TRAINING: CPT

## 2024-01-18 PROCEDURE — APPSS15 APP SPLIT SHARED TIME 0-15 MINUTES: Performed by: NURSE PRACTITIONER

## 2024-01-18 PROCEDURE — 99232 SBSQ HOSP IP/OBS MODERATE 35: CPT | Performed by: FAMILY MEDICINE

## 2024-01-18 PROCEDURE — 97110 THERAPEUTIC EXERCISES: CPT

## 2024-01-18 RX ADMIN — FAMOTIDINE 20 MG: 20 TABLET, FILM COATED ORAL at 08:58

## 2024-01-18 RX ADMIN — MUPIROCIN: 20 OINTMENT TOPICAL at 09:09

## 2024-01-18 RX ADMIN — SODIUM CHLORIDE, PRESERVATIVE FREE 10 ML: 5 INJECTION INTRAVENOUS at 08:58

## 2024-01-18 RX ADMIN — METHOCARBAMOL TABLETS 500 MG: 500 TABLET, COATED ORAL at 08:58

## 2024-01-18 RX ADMIN — ACETAMINOPHEN 325MG 650 MG: 325 TABLET ORAL at 20:57

## 2024-01-18 RX ADMIN — CARVEDILOL 6.25 MG: 6.25 TABLET, FILM COATED ORAL at 17:34

## 2024-01-18 RX ADMIN — OXYCODONE HYDROCHLORIDE 10 MG: 5 TABLET ORAL at 09:04

## 2024-01-18 RX ADMIN — CARVEDILOL 6.25 MG: 6.25 TABLET, FILM COATED ORAL at 08:58

## 2024-01-18 RX ADMIN — OXYCODONE HYDROCHLORIDE 10 MG: 5 TABLET ORAL at 21:03

## 2024-01-18 RX ADMIN — METHOCARBAMOL TABLETS 500 MG: 500 TABLET, COATED ORAL at 14:19

## 2024-01-18 RX ADMIN — ATORVASTATIN CALCIUM 10 MG: 10 TABLET, FILM COATED ORAL at 08:58

## 2024-01-18 RX ADMIN — MUPIROCIN: 20 OINTMENT TOPICAL at 20:57

## 2024-01-18 RX ADMIN — OXYCODONE HYDROCHLORIDE 10 MG: 5 TABLET ORAL at 14:18

## 2024-01-18 RX ADMIN — ACETAMINOPHEN 325MG 650 MG: 325 TABLET ORAL at 09:06

## 2024-01-18 RX ADMIN — ACETAMINOPHEN 325MG 650 MG: 325 TABLET ORAL at 02:54

## 2024-01-18 RX ADMIN — POLYETHYLENE GLYCOL 3350 17 G: 17 POWDER, FOR SOLUTION ORAL at 09:08

## 2024-01-18 RX ADMIN — METHOCARBAMOL TABLETS 500 MG: 500 TABLET, COATED ORAL at 20:57

## 2024-01-18 RX ADMIN — ENOXAPARIN SODIUM 40 MG: 100 INJECTION SUBCUTANEOUS at 08:58

## 2024-01-18 RX ADMIN — ACETAMINOPHEN 325MG 650 MG: 325 TABLET ORAL at 14:17

## 2024-01-18 RX ADMIN — SODIUM CHLORIDE, PRESERVATIVE FREE 10 ML: 5 INJECTION INTRAVENOUS at 08:59

## 2024-01-18 RX ADMIN — SODIUM CHLORIDE, PRESERVATIVE FREE 10 ML: 5 INJECTION INTRAVENOUS at 20:56

## 2024-01-18 ASSESSMENT — PAIN DESCRIPTION - ONSET
ONSET: ON-GOING
ONSET: ON-GOING

## 2024-01-18 ASSESSMENT — PAIN DESCRIPTION - PAIN TYPE
TYPE: SURGICAL PAIN
TYPE: SURGICAL PAIN

## 2024-01-18 ASSESSMENT — PAIN - FUNCTIONAL ASSESSMENT

## 2024-01-18 ASSESSMENT — PAIN DESCRIPTION - DESCRIPTORS
DESCRIPTORS: ACHING
DESCRIPTORS: DISCOMFORT

## 2024-01-18 ASSESSMENT — PAIN SCALES - GENERAL
PAINLEVEL_OUTOF10: 8
PAINLEVEL_OUTOF10: 9
PAINLEVEL_OUTOF10: 7
PAINLEVEL_OUTOF10: 8

## 2024-01-18 ASSESSMENT — PAIN DESCRIPTION - ORIENTATION
ORIENTATION: POSTERIOR
ORIENTATION: POSTERIOR

## 2024-01-18 ASSESSMENT — PAIN DESCRIPTION - LOCATION
LOCATION: NECK

## 2024-01-18 ASSESSMENT — PAIN DESCRIPTION - FREQUENCY
FREQUENCY: CONTINUOUS
FREQUENCY: CONTINUOUS

## 2024-01-18 NOTE — PROGRESS NOTES
Physical Therapy  Facility/Department: [address] ORTHO/MED SURG  Physical Therapy Treatment Note    Name: Moises Triplett  : 1955  MRN: [id number]  Date of Service: 2024    Discharge Recommendations:  Further therapy recommended at discharge.The patient should be able to tolerate at least 3 hours of therapy per day over 5 days or 15 hours over 7 days.   This patient may benefit from a Physical Medicine and Rehab consult.    PT Equipment Recommendations  Other: CTA pending progress with PT      Patient Diagnosis(es): The encounter diagnosis was Cervical stenosis of spinal canal.  Past Medical History:  has no past medical history on file.  Past Surgical History:  has a past surgical history that includes laminectomy (2024) and cervical fusion (N/A, 2024).    Assessment   Body Structures, Functions, Activity Limitations Requiring Skilled Therapeutic Intervention: Decreased functional mobility ;Decreased safe awareness;Decreased sensation;Decreased balance;Increased pain  Assessment: Pt required maxA x2 for bed mobility and for sit to stand transfer with use of poncho stedy. Pt required frequent cues to use R UE for balance only with fair return demo. When attempting to stand with use of LBQC, pt demonstrated a consistent spastic hip flexion/knee extenison movement which therapist attempted to correct by blocking feet then blocking knees. Pt continued to lift L LE in ataxic manner when attempting to stand with LBQC despite PTA and LOCKHART interventions. Pt demonstrated good effort t/o therapy session and these spastic and apparently involuntary movements were not noted when pt stood with poncho stedy. Pt unable to stand fully upright using LBQC with maxA x2 and max verbal cues to sequencing. Pt currently high risk of falls from both seated and standing positions, will require intensive PT to regain functional independence.  Therapy Prognosis: Good  Requires PT Follow-Up: Yes  Activity Tolerance  Activity  agreeable to PT, Pt resting in bed upon arrival, very pleasant and cooperative with treatment       Cognition   Orientation  Overall Orientation Status: Within Functional Limits  Orientation Level: Oriented X4  Cognition  Overall Cognitive Status: Exceptions  Arousal/Alertness: Appropriate responses to stimuli  Following Commands: Follows one step commands consistently;Follows multistep commands with increased time;Follows multistep commands with repitition  Attention Span: Attends with cues to redirect  Memory: Appears intact  Safety Judgement: Decreased awareness of need for assistance;Decreased awareness of need for safety  Problem Solving: Assistance required to correct errors made;Assistance required to generate solutions;Assistance required to identify errors made  Insights: Decreased awareness of deficits  Initiation: Requires cues for some  Sequencing: Requires cues for some  Cognition Comment: pt very talkative, requires redirection to task.     Objective   Pulse: 68  Heart Rate Source: Monitor  SpO2: 96 %  O2 Device: None (Room air)       Observation/Palpation  Posture: Fair  Observation: ace dressing lower L leg; minor scrapes on R side of head (pt reports \"probably from a fall\"), c-collar donned prior to mobility      Bed mobility  Supine to Sit: Maximum assistance;2 Person assistance (for trunk progression and to manage B LEs)  Sit to Supine: Unable to assess (pt left seated in recliner)  Scootin Person assistance  Bed Mobility Comments: pt encouraged to use L UE for balance while seated EOB, frequent verbal and tactile cues required for pt to not grasp bedrail with R UE  Transfers  Sit to Stand: 2 Person Assistance;Maximum Assistance  Stand to Sit: Maximum Assistance;2 Person Assistance  Bed to Chair: Dependent/Total (with poncho wiggins)  Comment: pt stood x2 trials to poncho wiggins with max A x2 with pt pulling up from seated position with L UE only. Attempted STS to LBQC x3 trials, pt unable to achieve

## 2024-01-18 NOTE — PLAN OF CARE
Problem: Safety - Adult  Goal: Free from fall injury  1/18/2024 0731 by Ana Boswell RN  Outcome: Progressing  1/17/2024 1833 by Elizabeth Sam RN  Outcome: Progressing     Problem: Discharge Planning  Goal: Discharge to home or other facility with appropriate resources  1/18/2024 0731 by Ana Boswell RN  Outcome: Progressing  1/17/2024 1833 by Elizabeth Sam RN  Outcome: Progressing     Problem: Pain  Goal: Verbalizes/displays adequate comfort level or baseline comfort level  1/18/2024 0731 by Ana Boswell RN  Outcome: Progressing  1/17/2024 1833 by Elizabeth Sam RN  Outcome: Progressing     Problem: Skin/Tissue Integrity  Goal: Absence of new skin breakdown  Description: 1.  Monitor for areas of redness and/or skin breakdown  2.  Assess vascular access sites hourly  3.  Every 4-6 hours minimum:  Change oxygen saturation probe site  4.  Every 4-6 hours:  If on nasal continuous positive airway pressure, respiratory therapy assess nares and determine need for appliance change or resting period.  1/18/2024 0731 by Ana Boswell RN  Outcome: Progressing  1/17/2024 1833 by Elizabeth Sam RN  Outcome: Progressing     Problem: ABCDS Injury Assessment  Goal: Absence of physical injury  1/18/2024 0731 by Ana Boswell RN  Outcome: Progressing  1/17/2024 1833 by Elizabeth Sam RN  Outcome: Progressing

## 2024-01-18 NOTE — PROGRESS NOTES
Occupational Therapy  Facility/Department: [address] ORTHO/MED SURG  Occupational Therapy Daily Treatment Note    Name: Moises Triplett  : 1955  MRN: [id number]  Date of Service: 2024    Discharge Recommendations:  Further therapy recommended at discharge.The patient should be able to tolerate at least 3 hours of therapy per day over 5 days or 15 hours over 7 days.   This patient may benefit from a Physical Medicine and Rehab consult          Patient Diagnosis(es): The encounter diagnosis was Cervical stenosis of spinal canal.  Past Medical History:  has no past medical history on file.  Past Surgical History:  has a past surgical history that includes laminectomy (2024) and cervical fusion (N/A, 2024).           Assessment   Performance deficits / Impairments: Decreased functional mobility ;Decreased ADL status;Decreased endurance;Decreased high-level IADLs;Decreased strength;Decreased ROM;Decreased balance;Decreased cognition;Decreased safe awareness;Decreased coordination  Assessment: Pt engaged in bed mobility and STS transfers w/ SS and attempted quad cane use MAX A x2. Pt demo's involuntary dystonia in LLE(specifically L hip) when attempting stands w/ quad cane, pt reported this is new occurence. Attempted to block B feet but was unsuccessful. Pt completed toilet transfer using SS and required assist for brief/darius care. Pt completed grooming ADLs seated in recliner. Pt required mod/max vc's to maintain NWB of RUE during transfers. Pt limited in ADL participation this date d/t decreased FMC coordination and decreased balance. Pt would benefit from continued OT to address above deficits.  Prognosis: Fair  Activity Tolerance  Activity Tolerance: Patient Tolerated treatment well;Treatment limited secondary to decreased cognition  Activity Tolerance Comments: pt demo's fear of falling, anxiety. Determined to participate in therapy.        Plan   Occupational Therapy Plan  Times Per Week: 3-5x/wk  limited by endurance;Patient limited by fatigue  Activity Tolerance Comments: pt limited by fear of falling  Bed mobility  Supine to Sit: Maximum assistance;2 Person assistance  Sit to Supine: Unable to assess  Scooting: Maximal assistance;2 Person assistance  Bed Mobility Comments: HOB elevated ~35 degrees, assist for trunk/BLE progression. Pt retired to chair at end of session. Mod cueing needed for adhering to NWB RUE when reaching for bed rail        Cognition  Overall Cognitive Status: Exceptions  Arousal/Alertness: Appropriate responses to stimuli  Following Commands: Follows one step commands consistently;Follows multistep commands with increased time;Follows multistep commands with repitition  Attention Span: Attends with cues to redirect  Memory: Appears intact  Safety Judgement: Decreased awareness of need for assistance;Decreased awareness of need for safety  Problem Solving: Assistance required to correct errors made;Assistance required to generate solutions;Assistance required to identify errors made  Insights: Decreased awareness of deficits  Initiation: Requires cues for some  Sequencing: Requires cues for some  Cognition Comment: pt very talkative, requires redirection to task.  Orientation  Overall Orientation Status: Within Functional Limits  Orientation Level: Oriented X4                  Education Given To: Patient  Education Provided: Role of Therapy;Transfer Training;Precautions;ADL Adaptive Strategies;Equipment  Education Provided Comments: OT role, NWB restrictions, c-collar donning, DME use, ADLs, transfer safety. Fair return.  Education Method: Verbal  Barriers to Learning: None  Education Outcome: Continued education needed          AM-PAC - ADL  AM-PAC Daily Activity - Inpatient   How much help is needed for putting on and taking off regular lower body clothing?: A Lot  How much help is needed for bathing (which includes washing, rinsing, drying)?: A Lot  How much help is needed for  toileting (which includes using toilet, bedpan, or urinal)?: A Lot  How much help is needed for putting on and taking off regular upper body clothing?: A Lot  How much help is needed for taking care of personal grooming?: A Lot  How much help for eating meals?: A Little  AM-[facility] Inpatient Daily Activity Raw Score: 13  AM-PAC Inpatient ADL T-Scale Score : 32.03  ADL Inpatient CMS 0-100% Score: 63.03  ADL Inpatient CMS G-Code Modifier : CL      Goals  Short Term Goals  Time Frame for Short Term Goals: pt will, by discharge  Short Term Goal 1: complete LB and UB ADL tasks with mod A, set up and AE/modified tech, asneeded  Short Term Goal 2: dem mod A during bed mobility in order to increase indepenedence  Short Term Goal 3: complete simple grooming tasks with min A and modified tech  Short Term Goal 4: follow 25% of commands during session with <4 verbal/tactile cues for initiation  Short Term Goal 5: maintain attention to task for ~1 minute with <4 verbal cues for redirection  Short Term Goal 6: dem ~6 minutes static sitting tolerance on eOB with mod A inorder to complete functional tasks  Long Term Goals  Time Frame for Long Term Goals : NOTIFY OTR TO UPDATE GOALS AS APPROPRIATE       Co- treatment with PT warranted secondary to decreased patient safety and independence with functional mobility requiring skilled physical assistance of two professionals to simultaneously address individualized discipline goals. OT is addressing ADLs, grooming tasks and static sitting tolerance , while PT is addressing their individualized functional mobility task.      Therapy Time   Individual Concurrent Group Co-treatment   Time In 1016         Time Out 1133         Minutes 77         Timed Code Treatment Minutes: 53 Minutes       RICHY Wells/MILI

## 2024-01-18 NOTE — PROGRESS NOTES
retrolisthesis of C4 and anterolisthesis of C7 over T1.     XR CERVICAL SPINE (2-3 VIEWS)    Result Date: 1/17/2024  Postoperative changes with good alignment and advanced degenerative disc disease.     XR CHEST (SINGLE VIEW FRONTAL)    Result Date: 1/16/2024  No acute cardiopulmonary process.     XR CERVICAL SPINE FLEXION AND EXTENSION    Result Date: 1/14/2024  1. Grade 1 anterolisthesis at C3-C4 with no evidence of instability. 2. Approximately 3 mm anterolisthesis at C7-T1 in neutral that does not change with extension.  This portion the spine is not visible due to the overlying shoulders on the flexion view. 3. Advanced multilevel spinal degenerative changes as noted previously.     XR CERVICAL SPINE FLEXION AND EXTENSION    Result Date: 1/13/2024  Severe multilevel degenerative spondylosis most prominent at C3-4, C4-5, C5-6, C6-7 and C7-T1. No evidence of instability.       Physical Examination:        Physical Exam  Vitals and nursing note reviewed.   Constitutional:       General: He is not in acute distress.     Interventions: Cervical collar in place.   HENT:      Head: Normocephalic and atraumatic.   Eyes:      Conjunctiva/sclera: Conjunctivae normal.      Pupils: Pupils are equal, round, and reactive to light.   Neck:      Comments: Drain still in place  Cardiovascular:      Rate and Rhythm: Normal rate and regular rhythm.      Heart sounds: No murmur heard.  Pulmonary:      Effort: Pulmonary effort is normal. No accessory muscle usage or respiratory distress.      Breath sounds: No stridor. No decreased breath sounds, wheezing, rhonchi or rales.   Abdominal:      General: Bowel sounds are normal. There is no distension.      Palpations: Abdomen is soft. Abdomen is not rigid.      Tenderness: There is no abdominal tenderness. There is no guarding.   Musculoskeletal:         General: No tenderness.      Comments: Left ankle in dressing   Skin:     General: Skin is warm and dry.      Findings: No erythema,

## 2024-01-18 NOTE — PROGRESS NOTES
Neurosurgery [facility]/Resident    Daily Progress Note   CC:No chief complaint on file.    1/18/2024  9:02 AM    Chart reviewed.  No acute events overnight.  No new complaints. Doing well sitting up in chair with cervical collar on, tolerating diet, reported some nausea yesterday, passing flatus and urinating without issues, denies numbness tingling to BUE and BLE, drain remains in place with 70ml/12 hours and 120ml/24 hours     Vitals:    01/18/24 0757 01/18/24 0758 01/18/24 0759 01/18/24 0800   BP:       Pulse:       Resp:       Temp:       TempSrc:       SpO2: 97% 96% 96% 99%   Weight:       Height:           PE:   AOx3   PERRL, EOMI    Motor   L deltoid 5/5; R deltoid 5/5  L biceps 5/5; R biceps 5/5  L triceps 5/5; R triceps 5/5  L wrist extension 5/5; R wrist extension 5/5  4/5 intrinsics      L iliopsoas 5/5 , R iliopsoas 5/5  L quadriceps 5/5; R quadriceps 5/5  L Dorsiflexion 5/5; R dorsiflexion 5/5  L Plantarflexion 5/5; R plantarflexion 5/5  L EHL 5/5; R EHL 5/5      Sensation: intact     Drain output: 70ml/12 hours and 120ml/24 hours   Incision: CDI       Lab Results   Component Value Date    WBC 8.9 01/17/2024    HGB 10.6 (L) 01/17/2024    HCT 33.1 (L) 01/17/2024     01/17/2024    ALT 14 01/17/2024    AST 19 01/17/2024     (L) 01/17/2024    K 4.5 01/17/2024     01/17/2024    CREATININE 0.7 01/17/2024    BUN 17 01/17/2024    CO2 23 01/17/2024    INR 1.0 01/16/2024    CRP 3.4 01/12/2024    SEDRATE 8 01/12/2024       A/P  68 y.o. male who presents with cervical stenosis of spinal canal.  POD #2 s/p c3-c6 laminectomy, C3-T1 fusion                     - X-ray cervical completed              - CT cervical completed              - C-Collar when OOB              - Okay for diet              - Okay for PT/OT              - Continue with pain management              - Maintain drain and document output- possible removal tomorrow    - SCD and Lovenox for DVT prophylaxis    - Robaxin and Roxicodone for

## 2024-01-19 ENCOUNTER — TELEPHONE (OUTPATIENT)
Dept: NEUROSURGERY | Age: 69
End: 2024-01-19

## 2024-01-19 PROBLEM — Z98.1 S/P CERVICAL SPINAL FUSION: Status: ACTIVE | Noted: 2024-01-19

## 2024-01-19 PROCEDURE — 6370000000 HC RX 637 (ALT 250 FOR IP): Performed by: PHYSICIAN ASSISTANT

## 2024-01-19 PROCEDURE — 99232 SBSQ HOSP IP/OBS MODERATE 35: CPT | Performed by: INTERNAL MEDICINE

## 2024-01-19 PROCEDURE — 6360000002 HC RX W HCPCS: Performed by: PHYSICIAN ASSISTANT

## 2024-01-19 PROCEDURE — 97530 THERAPEUTIC ACTIVITIES: CPT

## 2024-01-19 PROCEDURE — 6370000000 HC RX 637 (ALT 250 FOR IP): Performed by: INTERNAL MEDICINE

## 2024-01-19 PROCEDURE — 99232 SBSQ HOSP IP/OBS MODERATE 35: CPT | Performed by: FAMILY MEDICINE

## 2024-01-19 PROCEDURE — 2580000003 HC RX 258: Performed by: PHYSICIAN ASSISTANT

## 2024-01-19 PROCEDURE — 1200000000 HC SEMI PRIVATE

## 2024-01-19 PROCEDURE — 99222 1ST HOSP IP/OBS MODERATE 55: CPT | Performed by: PHYSICAL MEDICINE & REHABILITATION

## 2024-01-19 PROCEDURE — 6370000000 HC RX 637 (ALT 250 FOR IP): Performed by: NURSE PRACTITIONER

## 2024-01-19 PROCEDURE — APPSS30 APP SPLIT SHARED TIME 16-30 MINUTES: Performed by: PHYSICIAN ASSISTANT

## 2024-01-19 PROCEDURE — 97110 THERAPEUTIC EXERCISES: CPT

## 2024-01-19 PROCEDURE — 97535 SELF CARE MNGMENT TRAINING: CPT

## 2024-01-19 RX ORDER — OXYCODONE HYDROCHLORIDE AND ACETAMINOPHEN 5; 325 MG/1; MG/1
2 TABLET ORAL EVERY 4 HOURS PRN
Qty: 56 TABLET | Refills: 0 | Status: SHIPPED | OUTPATIENT
Start: 2024-01-19 | End: 2024-01-23

## 2024-01-19 RX ORDER — CARVEDILOL 6.25 MG/1
6.25 TABLET ORAL 2 TIMES DAILY WITH MEALS
Qty: 60 TABLET | Refills: 3 | DISCHARGE
Start: 2024-01-19

## 2024-01-19 RX ORDER — METHOCARBAMOL 500 MG/1
500 TABLET, FILM COATED ORAL 3 TIMES DAILY
Qty: 21 TABLET | Refills: 0 | Status: SHIPPED | OUTPATIENT
Start: 2024-01-19 | End: 2024-01-26

## 2024-01-19 RX ORDER — POLYETHYLENE GLYCOL 3350 17 G/17G
17 POWDER, FOR SOLUTION ORAL DAILY
Qty: 527 G | Refills: 0 | DISCHARGE
Start: 2024-01-20 | End: 2024-02-19

## 2024-01-19 RX ADMIN — METHOCARBAMOL TABLETS 500 MG: 500 TABLET, COATED ORAL at 21:45

## 2024-01-19 RX ADMIN — SODIUM CHLORIDE, PRESERVATIVE FREE 10 ML: 5 INJECTION INTRAVENOUS at 08:24

## 2024-01-19 RX ADMIN — SODIUM CHLORIDE, PRESERVATIVE FREE 10 ML: 5 INJECTION INTRAVENOUS at 08:22

## 2024-01-19 RX ADMIN — ENOXAPARIN SODIUM 40 MG: 100 INJECTION SUBCUTANEOUS at 08:21

## 2024-01-19 RX ADMIN — POLYETHYLENE GLYCOL 3350 17 G: 17 POWDER, FOR SOLUTION ORAL at 08:21

## 2024-01-19 RX ADMIN — CARVEDILOL 6.25 MG: 6.25 TABLET, FILM COATED ORAL at 08:17

## 2024-01-19 RX ADMIN — METHOCARBAMOL TABLETS 500 MG: 500 TABLET, COATED ORAL at 08:20

## 2024-01-19 RX ADMIN — OXYCODONE HYDROCHLORIDE 10 MG: 5 TABLET ORAL at 03:12

## 2024-01-19 RX ADMIN — ACETAMINOPHEN 325MG 650 MG: 325 TABLET ORAL at 21:45

## 2024-01-19 RX ADMIN — CARVEDILOL 6.25 MG: 6.25 TABLET, FILM COATED ORAL at 17:18

## 2024-01-19 RX ADMIN — METHOCARBAMOL TABLETS 500 MG: 500 TABLET, COATED ORAL at 15:07

## 2024-01-19 RX ADMIN — MUPIROCIN: 20 OINTMENT TOPICAL at 08:25

## 2024-01-19 RX ADMIN — ATORVASTATIN CALCIUM 10 MG: 10 TABLET, FILM COATED ORAL at 08:21

## 2024-01-19 RX ADMIN — OXYCODONE HYDROCHLORIDE 10 MG: 5 TABLET ORAL at 17:18

## 2024-01-19 RX ADMIN — ACETAMINOPHEN 325MG 650 MG: 325 TABLET ORAL at 15:06

## 2024-01-19 RX ADMIN — MUPIROCIN: 20 OINTMENT TOPICAL at 21:45

## 2024-01-19 RX ADMIN — ACETAMINOPHEN 325MG 650 MG: 325 TABLET ORAL at 02:11

## 2024-01-19 RX ADMIN — FAMOTIDINE 20 MG: 20 TABLET, FILM COATED ORAL at 08:21

## 2024-01-19 RX ADMIN — OXYCODONE HYDROCHLORIDE 10 MG: 5 TABLET ORAL at 09:15

## 2024-01-19 RX ADMIN — ACETAMINOPHEN 325MG 650 MG: 325 TABLET ORAL at 08:17

## 2024-01-19 ASSESSMENT — PAIN DESCRIPTION - DESCRIPTORS
DESCRIPTORS: SORE;ACHING
DESCRIPTORS: SHARP;SPASM
DESCRIPTORS: ACHING
DESCRIPTORS: PRESSURE;SHARP
DESCRIPTORS: SHARP;SPASM

## 2024-01-19 ASSESSMENT — PAIN DESCRIPTION - ORIENTATION
ORIENTATION: POSTERIOR
ORIENTATION: RIGHT;MID
ORIENTATION: MID
ORIENTATION: MID
ORIENTATION: POSTERIOR

## 2024-01-19 ASSESSMENT — PAIN SCALES - GENERAL
PAINLEVEL_OUTOF10: 8
PAINLEVEL_OUTOF10: 9
PAINLEVEL_OUTOF10: 9
PAINLEVEL_OUTOF10: 4

## 2024-01-19 ASSESSMENT — PAIN DESCRIPTION - LOCATION
LOCATION: NECK
LOCATION: NECK;SHOULDER
LOCATION: SHOULDER;NECK;HAND
LOCATION: NECK;SHOULDER
LOCATION: NECK;BACK

## 2024-01-19 ASSESSMENT — PAIN - FUNCTIONAL ASSESSMENT: PAIN_FUNCTIONAL_ASSESSMENT: ACTIVITIES ARE NOT PREVENTED

## 2024-01-19 NOTE — PROGRESS NOTES
[facility]  Office: [phone]  Chapin Muse DO, Cyrus Dunn DO, Franky Jules DO, Danny Sexton DO, Luis Angel Gracia MD, Julee Quiñones MD, Delroy Landeros MD, Madison Collier MD,  Tony Rosales MD, Joni Dubois MD, Soila Oliva MD,  Neftali Larkin DO, Alvaro Maria MD, Juan Luis Faustin MD, Torin Muse DO, Eden Barajas MD,  Marquis Lemus DO, Yarely Molina MD, Maya West MD, Karen Adler MD, Shyam Beckman MD,  Rojas Neri MD, Axel Tello MD, Ryan Batista MD, Tima Hoffmann MD, Michael Grimes MD, Angus Hunt MD, Ramiro Burns DO, Bhupinder Mao DO, Mari Villasenor MD,  Davion Graves MD, Shirley Waterhouse, CNP,  Alycia De La Fuente CNP, Tavon Watkins, CNP,  Katharine Merrill, LACHO, Laura Small, CNP, Brenda Jade, CNP, Malena Rossi CNP, Marya Watson, CNP, Kortney Wilson, CNP, Zabrina Obrien, PA-C, Soila Barber, PA-C, Tia Mohan, CNP, Betina Saldivar, CNS, Carmen Worley, CNP, Mamie Crain, CNP, Tracy Schwab, CNP         [facility]   IN-PATIENT SERVICE   [facility]    Progress Note    1/19/2024    2:16 PM    Name:   Moises Triplett  MRN:     [id number]     Acct:      [id number]   Room:   [facility]6/0246-01  IP Day:  7  Admit Date:  1/12/2024  5:47 PM    PCP:   Amparo Bradford APRN - NP  Code Status:  Full Code    Subjective:     C/C: : Right shoulder pain, frequent falls     Interval History Status: improved.     Patient seen and examined at bedside, no acute events overnight.  Postop day # 2   moving with therapy, was able to use the restroom was assessed, currently sitting in the recliner and exercising  Drain output noted  Patient otherwise denies any chest pain, shortness of breath, chills, fevers, nausea or vomiting.  Patient vitals, labs and all providers notes were reviewed,from overnight shift and morning updates were noted and discussed with the nurse      Brief History:     Per chart  Moises Triplett is a 68 y.o. Non- / non

## 2024-01-19 NOTE — PROGRESS NOTES
~20-25 mins total MOD A<>SBA. Pt demo's intermittent spastic post lean, mostly when EOB or on EOC. Pt sat supported in recliner at end of session.)  Standing: With support (4 STS from EOB, toilet and SS pads w/ SS MOD A x2, additional 2 STS from EOB w/ fanta walker MAX A x2, 2 face-to-face stands w/ PTA assisting pt in stand and LOCKHART blocking BLEs to prevent ataxic BLE movements. Pt demo's involuntary hip extension/knee flexion when attempting stands w/o SS. Pt standing ~10 mins total w/ breaks in-between. While in SS, pt able to  upright position and alternate weight-shifting through BLEs.)  Transfer Training  Transfer Training: Yes  Overall Level of Assistance: Additional time;Adaptive equipment;Assist X2;Maximum assistance;Moderate assistance (4 STS from EOB, toilet and SS pads w/ SS MOD A x2, additional 2 STS from EOB w/ fanta walker MAX A x2, 2 face-to-face stands from PTA and w/ LOCKHART blocking BLEs similar to SS setup MAX A x2.)  Interventions: Safety awareness training;Tactile cues;Verbal cues;Weight shifting training/pressure relief (body mechanics, tactile cueing to hold BLEs in place to prevent spastic hip flex/knee ext, safety awareness, BLE weight-shifting in SS)  Sit to Stand: Additional time;Adaptive equipment;Maximum assistance;Assist X2;Moderate assistance  Stand to Sit: Additional time;Adaptive equipment;Maximum assistance;Assist X2;Moderate assistance  Toilet Transfer: Moderate assistance;Assist X2;Additional time;Adaptive equipment (SS used)  Gait  Assistive Device: Walker fanta (SS)        ADL  Grooming: Moderate assistance;Increased time to complete;Setup  Grooming Skilled Clinical Factors: Pt sat EOB to engage in face washing w/ wash cloth. Pt required MOD A to lift RUE to reach temple/forehead d/t limited ROM in B shoulders.  UE Dressing: Maximum assistance  UE Dressing Skilled Clinical Factors: Don C-collar while sitting up in bed  LE Dressing: Dependent/Total  LE Dressing Skilled  Orientation Status: Within Functional Limits  Orientation Level: Oriented X4                  Education Given To: Patient  Education Provided: Role of Therapy;Transfer Training;Precautions;ADL Adaptive Strategies;Equipment  Education Provided Comments: OT role, NWB restrictions, c-collar donning, DME use, ADLs, transfer safety. Fair return.  Education Method: Verbal  Barriers to Learning: None  Education Outcome: Continued education needed;Verbalized understanding       AM-PAC - ADL  AM-PAC Daily Activity - Inpatient   How much help is needed for putting on and taking off regular lower body clothing?: A Lot  How much help is needed for bathing (which includes washing, rinsing, drying)?: A Lot  How much help is needed for toileting (which includes using toilet, bedpan, or urinal)?: A Lot  How much help is needed for putting on and taking off regular upper body clothing?: A Lot  How much help is needed for taking care of personal grooming?: A Lot  How much help for eating meals?: A Little  AM-[facility] Inpatient Daily Activity Raw Score: 13  AM-PAC Inpatient ADL T-Scale Score : 32.03  ADL Inpatient CMS 0-100% Score: 63.03  ADL Inpatient CMS G-Code Modifier : CL      Goals  Short Term Goals  Time Frame for Short Term Goals: pt will, by discharge  Short Term Goal 1: complete LB and UB ADL tasks with mod A, set up and AE/modified tech, asneeded  Short Term Goal 2: dem mod A during bed mobility in order to increase indepenedence  Short Term Goal 3: complete simple grooming tasks with min A and modified tech  Short Term Goal 4: follow 25% of commands during session with <4 verbal/tactile cues for initiation  Short Term Goal 5: maintain attention to task for ~1 minute with <4 verbal cues for redirection  Short Term Goal 6: dem ~6 minutes static sitting tolerance on eOB with mod A inorder to complete functional tasks  Long Term Goals  Time Frame for Long Term Goals : NOTIFY OTR TO UPDATE GOALS AS APPROPRIATE     Co- treatment with  PT warranted secondary to decreased patient safety and independence with functional mobility requiring skilled physical assistance of two professionals to simultaneously address individualized discipline goals. OT is addressing ADLs, grooming and sitting balance , while PT is addressing their individualized functional mobility task.      Therapy Time   Individual Concurrent Group Co-treatment   Time In 1343         Time Out 1439         Minutes 56         Timed Code Treatment Minutes: 38 Minutes       RICHY Wells/MILI

## 2024-01-19 NOTE — PROGRESS NOTES
[facility] -   Infectious diseases evaluation  admission date 1/12/2024    reason for consultation:   Surg clearance    Impression :   Current:  C spine stenosis w  multiple falls  S/p cervical spine surgery on 1/16  R ankle past ORIF 5/1/2022 w chronic osteomyelitis  - hardware removed 11/2022  Wound closed 2/15/23  Persistent open ankle wound  Saw Dr Amado [place]  Bacteriae was MRSA S doxy, w a superficial pseudomonas that did not need to be treated,  - keflex x few weeks - stopped  Back w cellulitis [facility],  I/D 6/16/23 / daptomycin 4 weeks for MRSA Dr Amado - then AB done  Then c diff 6/30/23  Still has a left ankle open wound as post op - no AB needed  Now has 1 screw  in place on the other side of the infection, and no plate- has been off AB x 7/2023  Allergy to sulfa    Other:  Smoker - stopped smoking 6/21/23  Discussion / summary of stay / plan of care     Recommendations   His should not have deep osteomyelitis remaining  in the ankle   Wound taking a long while to close - it is now 50% smaller since 7/2023 according to the pt  I believe the risk of cross infection is moderate   In order drop that risk I suggest  Keep the ankle dressed and cleaned  Pt to [facility] shower before the surgery. Daily x 3 days start 1/14  C wound  should be clean and dressed at all time till the c spine wound closes.  Mupirocin daily nostrils and axillae x 14 days  S/p cervical spine surgery on 1/16  Confusion post op  Keep wound dressed and cean      Pt worried about his teeth- lots of plaque and no abscess suspected at this time - suggested 2 x per year cleaning and good regular teeth care    Infection Control Recommendations   [place] Precautions      Antimicrobial Stewardship Recommendations   Off AB    History of Present Illness:   Initial history:  Moises Triplett is a 68 y.o.-year-old male fall at home abd could not get up-\increasing falls recently -  CT neck suggested diffuse

## 2024-01-19 NOTE — DISCHARGE INSTRUCTIONS
Posterior Cervical Decompression and Fusion Surgery Discharge Instructions     Thank you for choosing [facility] for your surgical needs. The following instructions will help to ensure your comfort and that you are well prepared after your surgery.     Post-Operative Visit:   The office is located at:    [facility]    [facility] [address], [address], [place] [address]    [phone]     Please also call your primary care physician to schedule an appointment for further evaluation and care.     Diet:   You may resume your regular diet.   Be sure to eat a well-balanced diet. Protein promotes wound healing.   Pain medication and decreased activity can cause constipation. Drink 8-10 glasses of water a day, eat fresh fruits and vegetables, and add prunes, raisins and bran cereals to your diet if you do become constipated. A stool softener taken 1-2 times a day is helpful. Dulcolax suppositories or Fleets enemas are also available without a prescription. Call our office if the problem continues.     Activity and Exercise:   No driving until you are seen in the office.   Avoid riding in a car for the first two weeks until you come to the office to have your staples removed.   Start taking short, frequent walks in the beginning. Newman, more frequent walks throughout the day are more beneficial than one long walk each day. You may gradually increase the distance; as tolerated. Your brace will help give support to your muscles while you walk.   If your pain increases, you may be walking too much or too far. Try backing off for a day or two and then resume slowly.   If physical therapy has been prescribed, you are not to perform range of motion, flexion, extension or lateral bending.   No lifting greater than 5 lbs (gallon of milk) for first few weeks after surgery.  No pushing, pulling, or overhead work.    No baths, swimming or hot tub until you discuss this with your doctor.     Brace:  If a cervical collar is prescribed, it should be worn at all times, except while showering and should be replaced immediately thereafter.     Incision Care and Hygiene:   Your incision may be may be closed with sutures Steri-strips, staples, or glue.    -The Steri-strips will fall off on their own in 7-10 days    -The staples or sutures should be removed about 2 weeks after surgery. If they are not removed please call the clinic to have them removed.    -The glue will dissolve over time   No ointments or lotions on the incision   It is OK to shower 3-4 days after surgery. Let water run over the incision. Gently pat the incision dry with a clean towel, do not rub. Leave incision site open to air. Then  re dress w dry dressing to avoid rubbing on it -  Do not open the Cervical wound at the same time as the left ankle wound -t avoid cross contamination - keep left ankle wound dressed ad never to air - start with the neck wound dressing first, then go to the ankle.  Wound care to take care of the left ankle wound w hydrafera blue or with silver alginate-    Pain Management:   Do not take NSAID medications (Ibuprofen, Naprosyn, etc.) or Escobedo-2 inhibitors (Celebrex, etc.) for  12  weeks following surgery.   You will be given a prescription for pain medication. Our hope is that you will eventually be weaned off all pain medications.   Try not to take the pain medicine unless you need to. If you feel that you do not need something that strong, you may use regular or extra-strength Tylenol instead.   DO NOT drink alcohol, drive or operate heavy machinery while taking your pain medications.   Notify the office if your pain is not controlled or you need a medication refill before your appointment.     Blood Thinning Medication:   If you were previously on any blood thinning medications (Clopidogrel, Warfarin, aspirin, etc.) that haven’t been

## 2024-01-19 NOTE — PROGRESS NOTES
Physical Therapy  Facility/Department: [address] ORTHO/MED SURG  Physical Therapy Treatment Note    Name: Moises Triplett  : 1955  MRN: [id number]  Date of Service: 2024    Discharge Recommendations:  Further therapy recommended at discharge.The patient should be able to tolerate at least 3 hours of therapy per day over 5 days or 15 hours over 7 days.   This patient may benefit from a Physical Medicine and Rehab consult.    PT Equipment Recommendations  Equipment Needed: Yes  Other: CTA pending progress with PT      Patient Diagnosis(es): The primary encounter diagnosis was S/P cervical spinal fusion. Diagnoses of Cervical stenosis of spinal canal, Multiple falls, and Closed nondisplaced fracture of acromial process of right scapula, initial encounter were also pertinent to this visit.  Past Medical History:  has no past medical history on file.  Past Surgical History:  has a past surgical history that includes laminectomy (2024) and cervical fusion (N/A, 2024).    Assessment   Body Structures, Functions, Activity Limitations Requiring Skilled Therapeutic Intervention: Decreased functional mobility ;Decreased safe awareness;Decreased sensation;Decreased balance;Increased pain  Assessment: Pt required maxA x2 for bed mobility and modA x2 for sit to stand transfer with use of poncho stedy. Pt required frequent cues to use R UE for balance only with fair return demo. When attempting to stand with use of fanta walker, pt demonstrated a consistent spastic hip flexion/knee extenison movement which therapist attempted to correct by blocking feet then blocking knees. Pt continued to lift L LE and push hips into extension in an ataxic manner when attempting to stand with fanta walker despite PTA and LOCKHART interventions. Pt demonstrated good effort t/o therapy session and these spastic and apparently involuntary movements were not noted when pt stood with poncho stedy.  Pt currently high risk of falls from both  assist  Short Term Goal 2: sit to stand transfer with min assist  Short Term Goal 3: bed to chair transfer with  min assist  Short Term Goal 4: 20 min strengthening exercise program x SBA  Short Term Goal 5: amb 125 ft with a RW x SBA     Education  Patient Education  Education Given To: Patient  Education Provided: Plan of Care;Role of Therapy;Home Exercise Program;Equipment;Transfer Training;Fall Prevention Strategies  Education Method: Demonstration;Verbal  Barriers to Learning: Other (Comment) (anxiety limiting pt's ability to stand with hemiwalker)  Education Outcome: Verbalized understanding;Continued education needed;Demonstrated understanding      Therapy Time   Individual Concurrent Group Co-treatment   Time In 1333         Time Out 1500         Minutes 87         Timed Code Treatment Minutes: 53 Minutes  Co- treatment with OT warranted secondary to decreased patient safety and independence with functional mobility requiring skilled physical assistance of two professionals to simultaneously address individualized discipline goals. PT is addressing B LE weakness and profoundly impaired balance, while OT is addressing their individualized functional mobility/self-care task.       Gladys Conte, PTA

## 2024-01-19 NOTE — CONSULTS
Physical Medicine & Rehabilitation  Consult Note      Admitting Physician:   Madison Collier MD    Primary Care Provider:   Amparo Bradford APRN - NP     Reason for Consult:  Acute Inpatient Rehabilitation    Chief Complaint: Falls    History of Present Illness:  Referring Provider is requesting an evaluation for appropriate placement upon discharge from acute care. History from chart review and patient.    Moises Triplett is a 68 y.o. RHD male admitted to [facility] on 1/12/2024.      Patient was initially admitted to [facility] after sustaining multiple falls at home. Initial diagnostic studies showed R acromion fracture and orthopedics recommended limited AROM R shoulder until follow up in 6 weeks. Diagnostic studies confirmed cervical stenosis with myelopathy findings on exam and he was transferred to [place] for neurosurgical intervention.     Neurosurgery: Patient had posterior cervical laminectomy C3-6 with fixation screws and arthrodesis C3-T1 by Dr. Perales on 1/16/24 for cervical stenosis.     ID: Known history chronic osteomyelitis R ankle from 2022 with persistent wound R ankle - now healed/closed. L ankle open wound now. No current antibiotics.     He reports longstanding issues with \"feeling wobbly\" and falls at home while receiving home healthcare for the past few months. He previously had SNF admissions starting in 2022 for ankle wound care with osteomyelitis treatment.     Review of Systems:  Constitutional: negative for anorexia, chills, fatigue, fevers, sweats and weight loss  Eyes: negative for redness and visual disturbance  Ears, nose, mouth, throat, and face: negative for earaches, sore throat and tinnitus  Respiratory: negative for cough and shortness of breath  Cardiovascular: negative for chest pain, dyspnea and palpitations  Gastrointestinal: negative for abdominal pain, change in bowel habits, nausea and vomiting. Positive for constipation.   Genitourinary:negative for dysuria,  no acute abnormality.     Chronic microvascular disease without acute intracranial abnormality.     XR SHOULDER RIGHT (MIN 2 VIEWS)    Result Date: 1/8/2024  EXAMINATION: TWO XRAY VIEWS OF THE RIGHT SHOULDER; THREE XRAY VIEWS OF THE RIGHT KNEE; THREE XRAY VIEWS OF THE LEFT KNEE 1/8/2024 2:26 pm COMPARISON: None. HISTORY: ORDERING SYSTEM PROVIDED HISTORY: fall pain TECHNOLOGIST PROVIDED HISTORY: fall pain Reason for Exam: Multiple recent falls, pt has multiple spasms in legs.  Pain in shoulders and both knees 68-year-old male with shoulder and bilateral knee pain after a fall FINDINGS: Right shoulder: Severe right glenohumeral osteoarthrosis.  Moderate to severe degenerative changes at the right AC joint.  Possible nondisplaced fracture at the junction of the acromion and scapula. Visualized right-sided ribs appear intact.  Atherosclerotic calcification of the aortic knob. Right knee: No sizable joint effusion is identified. Atherosclerotic calcification of the vasculature. Osseous alignment is normal.  Mild narrowing and osteophyte spurring of the medial compartment.  No acute fracture or gross dislocation is seen.  No suspicious osteolytic or osteoblastic lesions are identified. Left knee: No sizable joint effusion is identified. Osseous alignment is normal.  Mild narrowing of the medial compartment. Atherosclerotic calcification of the vasculature.  No acute fracture or gross dislocation is seen.  No suspicious osteolytic or osteoblastic lesions are identified.     Right shoulder: 1. Suspected nondisplaced fracture at the junction of the acromion and scapula. 2. Severe right glenohumeral osteoarthrosis. 3. Moderate to severe degenerative changes of the right AC joint. Right knee: 1. Mild degenerative changes of the medial compartment. 2. No acute fracture or dislocation. Left knee: 1. Mild degenerative change of the medial compartment. 2. No acute fracture or dislocation.     XR KNEE LEFT (3 VIEWS)    Result Date:

## 2024-01-19 NOTE — PROGRESS NOTES
Neurosurgery SANTIAGO/Resident    Daily Progress Note   No chief complaint on file.    1/19/2024  8:19 AM    Chart reviewed.  No acute events overnight.  No new complaints.     Vitals:    01/18/24 2043 01/18/24 2133 01/19/24 0342 01/19/24 0712   BP: (!) 136/90   (!) 148/109   Pulse: 82   67   Resp: 18 18 18    Temp: 98 °F (36.7 °C)   97.5 °F (36.4 °C)   TempSrc: Oral   Oral   SpO2: 98%   100%   Weight:       Height:             PE:   AOx3   Motor   L deltoid 4/5; R deltoid 4/5 abduction 80%  L biceps 5/5; R biceps 5/5  L triceps 5/5; R triceps 5/5  L wrist extension 5/5; R wrist extension 5/5  L intrinsics 4/5; R intrinsics 4/5      L iliopsoas 5/5 , R iliopsoas 5/5  L quadriceps 5/5; R quadriceps 5/5  L Dorsiflexion 5/5; R dorsiflexion 5/5  L Plantarflexion 5/5; R plantarflexion 5/5  L EHL 5/5; R EHL 5/5    Sensation intact     Drain output 60ml/12hr, 140ml/24hr  Incision c/d/i      Lab Results   Component Value Date    WBC 8.0 01/18/2024    HGB 11.6 (L) 01/18/2024    HCT 32.2 (L) 01/18/2024    PLT See Reflexed IPF Result 01/18/2024    ALT 14 01/17/2024    AST 19 01/17/2024     (L) 01/18/2024    K 3.9 01/18/2024    CL 98 01/18/2024    CREATININE 0.4 (L) 01/18/2024    BUN 10 01/18/2024    CO2 26 01/18/2024    INR 1.0 01/16/2024    CRP 3.4 01/12/2024    SEDRATE 8 01/12/2024       A/P  68 y.o. male who presents with cervical stenosis of spinal canal.  POD  3 s/p c3-c6 laminectomy, C3-T1 fusion     - aspen when OOB  - continue PT/OT  - dc drain today  - SCD and Lovenox for DVT prophylaxis   - f/u PM&R recs         Please contact neurosurgery with any changes in patients neurologic status.       Krystyna Walters PA-C  1/19/24  8:19 AM

## 2024-01-19 NOTE — PROGRESS NOTES
[facility] -   Infectious diseases evaluation  admission date 1/12/2024    reason for consultation:   Surg clearance    Impression :   Current:  C spine stenosis w  multiple falls  S/p cervical spine surgery on 1/16  R ankle past ORIF 5/1/2022 w chronic osteomyelitis  - hardware removed 11/2022  Wound closed 2/15/23  Persistent open ankle wound  Saw Dr Amado [facility]  Bacteriae was MRSA S doxy, w a superficial pseudomonas that did not need to be treated,  - keflex x few weeks - stopped  Back w cellulitis [facility],  I/D 6/16/23 / daptomycin 4 weeks for MRSA Dr Amado - then AB done  Then c diff 6/30/23  Still has a left ankle open wound as post op - no AB needed  Now has 1 screw  in place on the other side of the infection, and no plate- has been off AB x 7/2023  Allergy to sulfa    Other:  Smoker - stopped smoking 6/21/23  Discussion / summary of stay / plan of care   His should not have deep osteomyelitis remaining  in the ankle   Wound taking a long while to close - it is now 50% smaller since 7/2023 according to the pt  I believe the risk of cross infection is moderate   In order drop that risk I suggest  Keep the ankle dressed and cleaned  Pt to [facility] shower before the surgery. Daily x 3 days start 1/14  C wound  should be clean and dressed at all time till the c spine wound closes.  Mupirocin daily nostrils and axillae x 14 days  Recommendations     S/p cervical spine surgery on 1/16  Keep wound dressed and cean  Keep nasal mupirocin x 14 days      Pt worried about his teeth- lots of plaque and no abscess suspected at this time - suggested 2 x per year cleaning and good regular teeth care    Infection Control Recommendations   [place] Precautions      Antimicrobial Stewardship Recommendations   Off AB    History of Present Illness:   Initial history:  Moises Triplett is a 68 y.o.-year-old male fall at home abd could not get up-\increasing falls recently -  CT neck    BILITOT 0.2*   ALKPHOS 97   ALT 14   AST 19       No results for input(s): \"RPR\" in the last 72 hours.  No results for input(s): \"HIV\" in the last 72 hours.  No results for input(s): \"BC\" in the last 72 hours.  Lab Results   Component Value Date/Time    CREATININE 0.4 01/18/2024 09:18 AM    GLUCOSE 116 01/18/2024 09:18 AM    GLUCOSE 100 06/12/2023 02:26 PM       Detailed results:        Thank you for allowing us to participate in the care of this patient.Please call with questions.    This note is created with the assistance of a speech recognition program.  While intending to generate adocument that actually reflects the content of the visit, the document can still have some errors including those of syntax and sound a like substitutions which may escape proof reading.  It such instances, actual meaningcan be extrapolated by contextual diversion.        Desmond Barreto MD  Family Medicine Resident, PGY-2   01/19/24            I have discussed the care of the patient, including pertinent history and exam findings,  with the resident., student or CNP- I have seen and examined the patient and the key elements of all parts of the encounter have been performed by me.  I agree with the assessment, plan and orders as documented by the resident.student or CNP    Ilene Heard, Infectious Diseases

## 2024-01-19 NOTE — PROGRESS NOTES
Nutrition Assessment     Type and Reason for Visit: RD Nutrition Re-Screen/LOS    Nutrition Recommendations/Plan:   Continue with current diet. Monitor/encourage adequate po intake       Nutrition Assessment:  Pt seen for length of stay. Admitted d/t frequent falls, s/p C3-C6 laminectomy, C3-T1 fusion. Unable to talk with pt at time of visit, however lunch tray observed with 50-75% consumed. Per ADLs, pt taking in good fluids. No nutrition concerns at this time. Discharge planning in place.      Nutrition Related Findings:     Wound Type: None    Current Nutrition Therapies:    ADULT DIET; Regular    Anthropometric Measures:  Height: 172.7 cm (5' 7.99\")  Current Body Wt: 57 kg (125 lb 10.6 oz)   BMI: 19.1    Nutrition Diagnosis:   No nutrition diagnosis at this time     Nutrition Interventions:   Food and/or Nutrient Delivery: Continue Current Diet  Nutrition Education/Counseling: No recommendation at this time  Coordination of Nutrition Care: Continue to monitor while inpatient       Goals:  Previous Goal Met:  (goal set)  Goals: Meet at least 75% of estimated needs, prior to discharge       Nutrition Monitoring and Evaluation:      Food/Nutrient Intake Outcomes: Food and Nutrient Intake  Physical Signs/Symptoms Outcomes: Meal Time Behavior    Discharge Planning:    Continue current diet     Faustina Hayes RD, LD  Contact: [phone]

## 2024-01-19 NOTE — PROGRESS NOTES
[facility] -   Infectious diseases evaluation  admission date 1/12/2024    reason for consultation:   Surg clearance    Impression :   Current:  C spine stenosis w  multiple falls  S/p cervical spine surgery on 1/16  R ankle past ORIF 5/1/2022 w chronic osteomyelitis  - hardware removed 11/2022  Wound closed 2/15/23  Persistent open ankle wound  Saw Dr Amado [facility]  Bacteriae was MRSA S doxy, w a superficial pseudomonas that did not need to be treated,  - keflex x few weeks - stopped  Back w cellulitis [facility],  I/D 6/16/23 / daptomycin 4 weeks for MRSA Dr Amado - then AB done  Then c diff 6/30/23  Still has a left ankle open wound as post op - no AB needed  Now has 1 screw  in place on the other side of the infection, and no plate- has been off AB x 7/2023  Allergy to sulfa    Other:  Smoker - stopped smoking 6/21/23  Discussion / summary of stay / plan of care   His should not have deep osteomyelitis remaining  in the ankle   Wound taking a long while to close - it is now 50% smaller since 7/2023 according to the pt  I believe the risk of cross infection is moderate   In order drop that risk I suggest  Keep the ankle dressed and cleaned  Pt to [facility] shower before the surgery. Daily x 3 days start 1/14  C wound  should be clean and dressed at all time till the c spine wound closes.  Mupirocin daily nostrils and axillae x 14 days  Recommendations     S/p cervical spine surgery on 1/16  Keep wound dressed and cean  Keep nasal mupirocin x 14 days      Pt worried about his teeth- lots of plaque and no abscess suspected at this time - suggested 2 x per year cleaning and good regular teeth care    Infection Control Recommendations   [place] Precautions      Antimicrobial Stewardship Recommendations   Off AB    History of Present Illness:   Initial history:  Moises Triplett is a 68 y.o.-year-old male fall at home abd could not get up-\increasing falls recently -  CT neck     Smoking status: Former     Types: Cigarettes    Smokeless tobacco: Never   Substance and Sexual Activity    Alcohol use: Yes     Comment: beer a day    Drug use: Yes     Frequency: 7.0 times per week     Types: Marijuana (Weed)    Sexual activity: Not on file   Other Topics Concern    Not on file   Social History Narrative    Not on file     Social Determinants of Health     Financial Resource Strain: Not on file   Food Insecurity: No Food Insecurity (1/8/2024)    Hunger Vital Sign     Worried About Running Out of Food in the Last Year: Never true     Ran Out of Food in the Last Year: Never true   Transportation Needs: No Transportation Needs (1/8/2024)    PRAPARE - Transportation     Lack of Transportation (Medical): No     Lack of Transportation (Non-Medical): No   Physical Activity: Not on file   Stress: Not on file   Social Connections: Not on file   Intimate Partner Violence: Not on file   Housing Stability: Low Risk  (1/8/2024)    Housing Stability Vital Sign     Unable to Pay for Housing in the Last Year: No     Number of Places Lived in the Last Year: 1     Unstable Housing in the Last Year: No       Family History:   History reviewed. No pertinent family history.   Medical Decision Making:   I have independently reviewed/ordered the following labs:    CBC with Differential:   Recent Labs     01/17/24  0632 01/18/24  0918   WBC 8.9 8.0   HGB 10.6* 11.6*   HCT 33.1* 32.2*    See Reflexed IPF Result   LYMPHOPCT 9* 16*   MONOPCT 5 10       BMP:  Recent Labs     01/17/24  0632 01/18/24  0918   * 134*   K 4.5 3.9    98   CO2 23 26   BUN 17 10   CREATININE 0.7 0.4*       Hepatic Function Panel:   Recent Labs     01/17/24  0632   PROT 5.4*   LABALBU 3.3*   BILITOT 0.2*   ALKPHOS 97   ALT 14   AST 19       No results for input(s): \"RPR\" in the last 72 hours.  No results for input(s): \"HIV\" in the last 72 hours.  No results for input(s): \"BC\" in the last 72 hours.  Lab Results   Component Value  Date/Time    CREATININE 0.4 01/18/2024 09:18 AM    GLUCOSE 116 01/18/2024 09:18 AM    GLUCOSE 100 06/12/2023 02:26 PM       Detailed results:        Thank you for allowing us to participate in the care of this patient.Please call with questions.    This note is created with the assistance of a speech recognition program.  While intending to generate adocument that actually reflects the content of the visit, the document can still have some errors including those of syntax and sound a like substitutions which may escape proof reading.  It such instances, actual meaningcan be extrapolated by contextual diversion.        Ilene Heard, Infectious Diseases

## 2024-01-19 NOTE — CARE COORDINATION
HELP notified to see patient as pt has questions about medicaid and disability, ML for Issa to follow up.    Will need SNF choices as pt has 20 stairs up to his second floor and will most likely not be rehabed in 2 weeks   Gabapentin Counseling: I discussed with the patient the risks of gabapentin including but not limited to dizziness, somnolence, fatigue and ataxia.

## 2024-01-20 LAB
ANION GAP SERPL CALCULATED.3IONS-SCNC: 11 MMOL/L (ref 9–17)
BASOPHILS # BLD: 0.04 K/UL (ref 0–0.2)
BASOPHILS NFR BLD: 1 % (ref 0–2)
BUN SERPL-MCNC: 16 MG/DL (ref 8–23)
CALCIUM SERPL-MCNC: 8.9 MG/DL (ref 8.6–10.4)
CHLORIDE SERPL-SCNC: 96 MMOL/L (ref 98–107)
CO2 SERPL-SCNC: 25 MMOL/L (ref 20–31)
CREAT SERPL-MCNC: 0.4 MG/DL (ref 0.7–1.2)
EOSINOPHIL # BLD: 0.06 K/UL (ref 0–0.44)
EOSINOPHILS RELATIVE PERCENT: 1 % (ref 1–4)
ERYTHROCYTE [DISTWIDTH] IN BLOOD BY AUTOMATED COUNT: 19 % (ref 11.8–14.4)
GFR SERPL CREATININE-BSD FRML MDRD: >60 ML/MIN/1.73M2
GLUCOSE SERPL-MCNC: 100 MG/DL (ref 70–99)
HCT VFR BLD AUTO: 36.8 % (ref 40.7–50.3)
HGB BLD-MCNC: 11.6 G/DL (ref 13–17)
IMM GRANULOCYTES # BLD AUTO: <0.03 K/UL (ref 0–0.3)
IMM GRANULOCYTES NFR BLD: 0 %
LYMPHOCYTES NFR BLD: 1.73 K/UL (ref 1.1–3.7)
LYMPHOCYTES RELATIVE PERCENT: 28 % (ref 24–43)
MCH RBC QN AUTO: 26.1 PG (ref 25.2–33.5)
MCHC RBC AUTO-ENTMCNC: 31.5 G/DL (ref 28.4–34.8)
MCV RBC AUTO: 82.7 FL (ref 82.6–102.9)
MONOCYTES NFR BLD: 0.59 K/UL (ref 0.1–1.2)
MONOCYTES NFR BLD: 10 % (ref 3–12)
NEUTROPHILS NFR BLD: 60 % (ref 36–65)
NEUTS SEG NFR BLD: 3.72 K/UL (ref 1.5–8.1)
NRBC BLD-RTO: 0 PER 100 WBC
PLATELET # BLD AUTO: 478 K/UL (ref 138–453)
PMV BLD AUTO: 8.6 FL (ref 8.1–13.5)
POTASSIUM SERPL-SCNC: 3.8 MMOL/L (ref 3.7–5.3)
RBC # BLD AUTO: 4.45 M/UL (ref 4.21–5.77)
RBC # BLD: ABNORMAL 10*6/UL
SODIUM SERPL-SCNC: 132 MMOL/L (ref 135–144)
WBC OTHER # BLD: 6.2 K/UL (ref 3.5–11.3)

## 2024-01-20 PROCEDURE — 97110 THERAPEUTIC EXERCISES: CPT

## 2024-01-20 PROCEDURE — 99232 SBSQ HOSP IP/OBS MODERATE 35: CPT | Performed by: FAMILY MEDICINE

## 2024-01-20 PROCEDURE — 1200000000 HC SEMI PRIVATE

## 2024-01-20 PROCEDURE — 85025 COMPLETE CBC W/AUTO DIFF WBC: CPT

## 2024-01-20 PROCEDURE — 6370000000 HC RX 637 (ALT 250 FOR IP): Performed by: PHYSICIAN ASSISTANT

## 2024-01-20 PROCEDURE — 2580000003 HC RX 258: Performed by: PHYSICIAN ASSISTANT

## 2024-01-20 PROCEDURE — 36415 COLL VENOUS BLD VENIPUNCTURE: CPT

## 2024-01-20 PROCEDURE — 6360000002 HC RX W HCPCS: Performed by: PHYSICIAN ASSISTANT

## 2024-01-20 PROCEDURE — 80048 BASIC METABOLIC PNL TOTAL CA: CPT

## 2024-01-20 PROCEDURE — 97530 THERAPEUTIC ACTIVITIES: CPT

## 2024-01-20 PROCEDURE — 6370000000 HC RX 637 (ALT 250 FOR IP): Performed by: INTERNAL MEDICINE

## 2024-01-20 PROCEDURE — 6370000000 HC RX 637 (ALT 250 FOR IP): Performed by: FAMILY MEDICINE

## 2024-01-20 PROCEDURE — 97535 SELF CARE MNGMENT TRAINING: CPT

## 2024-01-20 PROCEDURE — 99232 SBSQ HOSP IP/OBS MODERATE 35: CPT | Performed by: INTERNAL MEDICINE

## 2024-01-20 PROCEDURE — 6370000000 HC RX 637 (ALT 250 FOR IP): Performed by: NURSE PRACTITIONER

## 2024-01-20 RX ORDER — LISINOPRIL 5 MG/1
5 TABLET ORAL DAILY
Status: DISCONTINUED | OUTPATIENT
Start: 2024-01-20 | End: 2024-01-20

## 2024-01-20 RX ORDER — LISINOPRIL 10 MG/1
10 TABLET ORAL DAILY
Status: DISCONTINUED | OUTPATIENT
Start: 2024-01-20 | End: 2024-01-23 | Stop reason: HOSPADM

## 2024-01-20 RX ADMIN — METHOCARBAMOL TABLETS 500 MG: 500 TABLET, COATED ORAL at 22:05

## 2024-01-20 RX ADMIN — METHOCARBAMOL TABLETS 500 MG: 500 TABLET, COATED ORAL at 09:31

## 2024-01-20 RX ADMIN — POLYETHYLENE GLYCOL 3350 17 G: 17 POWDER, FOR SOLUTION ORAL at 09:32

## 2024-01-20 RX ADMIN — ATORVASTATIN CALCIUM 10 MG: 10 TABLET, FILM COATED ORAL at 09:31

## 2024-01-20 RX ADMIN — ACETAMINOPHEN 325MG 650 MG: 325 TABLET ORAL at 22:06

## 2024-01-20 RX ADMIN — MUPIROCIN: 20 OINTMENT TOPICAL at 22:08

## 2024-01-20 RX ADMIN — OXYCODONE HYDROCHLORIDE 10 MG: 5 TABLET ORAL at 22:05

## 2024-01-20 RX ADMIN — SODIUM CHLORIDE, PRESERVATIVE FREE 10 ML: 5 INJECTION INTRAVENOUS at 09:33

## 2024-01-20 RX ADMIN — ACETAMINOPHEN 325MG 650 MG: 325 TABLET ORAL at 09:31

## 2024-01-20 RX ADMIN — OXYCODONE HYDROCHLORIDE 10 MG: 5 TABLET ORAL at 03:14

## 2024-01-20 RX ADMIN — ENOXAPARIN SODIUM 40 MG: 100 INJECTION SUBCUTANEOUS at 09:31

## 2024-01-20 RX ADMIN — ACETAMINOPHEN 325MG 650 MG: 325 TABLET ORAL at 03:14

## 2024-01-20 RX ADMIN — ACETAMINOPHEN 325MG 650 MG: 325 TABLET ORAL at 15:40

## 2024-01-20 RX ADMIN — MUPIROCIN: 20 OINTMENT TOPICAL at 09:32

## 2024-01-20 RX ADMIN — OXYCODONE HYDROCHLORIDE 10 MG: 5 TABLET ORAL at 15:41

## 2024-01-20 RX ADMIN — OXYCODONE HYDROCHLORIDE 10 MG: 5 TABLET ORAL at 09:31

## 2024-01-20 RX ADMIN — CARVEDILOL 6.25 MG: 6.25 TABLET, FILM COATED ORAL at 17:15

## 2024-01-20 RX ADMIN — LISINOPRIL 10 MG: 10 TABLET ORAL at 09:31

## 2024-01-20 RX ADMIN — CARVEDILOL 6.25 MG: 6.25 TABLET, FILM COATED ORAL at 09:31

## 2024-01-20 RX ADMIN — FAMOTIDINE 20 MG: 20 TABLET, FILM COATED ORAL at 09:31

## 2024-01-20 RX ADMIN — METHOCARBAMOL TABLETS 500 MG: 500 TABLET, COATED ORAL at 15:41

## 2024-01-20 ASSESSMENT — PAIN DESCRIPTION - LOCATION: LOCATION: NECK;SHOULDER

## 2024-01-20 ASSESSMENT — PAIN DESCRIPTION - ORIENTATION
ORIENTATION: MID
ORIENTATION: RIGHT

## 2024-01-20 ASSESSMENT — PAIN DESCRIPTION - DESCRIPTORS: DESCRIPTORS: ACHING;SHOOTING;SHARP

## 2024-01-20 ASSESSMENT — PAIN SCALES - GENERAL
PAINLEVEL_OUTOF10: 8
PAINLEVEL_OUTOF10: 7
PAINLEVEL_OUTOF10: 5
PAINLEVEL_OUTOF10: 9

## 2024-01-20 NOTE — PROGRESS NOTES
[facility]   Infectious diseases evaluation  admission date 1/12/2024    reason for consultation:   Surg clearance    Impression :   Current:  C spine stenosis w  multiple falls  S/p cervical spine surgery on 1/16  R ankle past ORIF 5/1/2022 w chronic osteomyelitis  - hardware removed 11/2022  Wound closed 2/15/23  Persistent open ankle wound  Saw Dr Amado at [facility]  Bacteria were MRSA S doxy, w a superficial Pseudomonas that did not need to be treated,  - keflex x few weeks - stopped  Back w cellulitis [facility],  I&D 6/16/23 .  Treated with daptomycin 4 weeks for MRSA Dr Amado - then AB were considered completed  Then developed c diff 6/30/23  Still has a left ankle open wound as post op - no AB needed  Now has 1 screw  in place on the other side of the infection, and no plate- has been off AB since 7/2023  Allergy to sulfa    Other:  Smoker - stopped smoking 6/21/23  Discussion / summary of stay / plan of care   His should not have deep osteomyelitis remaining  in the ankle   Wound taking a long while to close - it is now 50% smaller since 7/2023 according to the pt  I believe the risk of cross infection is moderate   In order to reduce that risk I suggest  Keep the ankle dressed and cleaned  Employee hibiclens shower before the surgery. Daily x 3 days start 1/14  Mupirocin daily nostrils and axillae x 7 days  Recommendations     S/p cervical spine surgery on 1/16  Keep wound dressed and clean  Keep nasal mupirocin x 7 days      Pt worried about his teeth- lots of plaque and no abscess suspected at this time - suggested 2 x per year cleaning and good regular oral hygiene    Infection Control Recommendations   [place] Precautions      Antimicrobial Stewardship Recommendations   Off AB    History of Present Illness:   Initial history:  Moises Triplett is a 68 y.o.-year-old male fall at home abd could not get up-\increasing falls recently -  CT neck suggested diffuse

## 2024-01-20 NOTE — PROGRESS NOTES
[facility]  Office: [phone]  Chapin uMse DO, Cyrus Dunn, DO, Franky Jules DO, Danny Sexton, DO, Luis Angel Gracia MD, Julee Quiñones MD, Delroy Landeros MD, Madison Collier MD,  Tony Rosales MD, Joni Dubois MD, Soila Oliva MD,  Neftali Larkin DO, Alvaro Maria MD, Juan Luis Faustin MD, Torin Muse DO, Eden Barajas MD,  Marquis Lemus DO, Yarely Molina MD, Maya West MD, Karen Adelr MD, Shyam Beckman MD,  Rojas Neri MD, Axel Tello MD, yRan Batista MD, Tima Hoffmann MD, Michael Grimes MD, Angus Hunt MD, Ramiro Burns DO, Bhupinder Mao DO, Mari Villasenor MD,  Davion Graves MD, Shirley Waterhouse, CNP,  Alycia De La Fuente, CNP, Tavon Watkins, CNP,  Katharine Merrill, LACHO, Laura Small, CNP, Brenda Jade, CNP, Malena Rossi CNP, Marya Watson, CNP, Kortney Wilson, CNP, Zabrina Obrien, PA-C, Soila Barber, PA-C, Tia Mohan, CNP, Betina Saldivar, CNS, Carmen Worley, CNP, Mamie Crain, CNP, Tracy Schwab, CNP         [facility]   IN-PATIENT SERVICE   [facility]    Progress Note    1/20/2024    2:39 PM    Name:   Moises Triplett  MRN:     [id number]     Acct:      [id number]   Room:   [facility]6/0246-01   Day:  8  Admit Date:  1/12/2024  5:47 PM    PCP:   Amparo Bradford APRN - NP  Code Status:  Full Code    Subjective:     C/C: : Right shoulder pain, frequent falls     Interval History Status: improved.     Patient seen and examined at bedside, no acute events overnight.  Drain is out, sitting in chair  Good mood and thankful   Patient otherwise denies any chest pain, shortness of breath, chills, fevers, nausea or vomiting.  Patient vitals, labs and all providers notes were reviewed,from overnight shift and morning updates were noted and discussed with the nurse      Brief History:     Per chart  Moises Triplett is a 68 y.o. Non- / non  male who initially presented to [place] ED 1/8/24 for frequent falls. Patient has a  WC    famotidine  20 mg Oral Daily    sodium chloride flush  5-40 mL IntraVENous 2 times per day    acetaminophen  650 mg Oral Q6H    polyethylene glycol  17 g Oral Daily    methocarbamol  500 mg Oral TID    enoxaparin  40 mg SubCUTAneous Daily    mupirocin   Each Nostril BID    atorvastatin  10 mg Oral Daily    sodium chloride flush  5-40 mL IntraVENous 2 times per day    nicotine  1 patch TransDERmal Daily     Continuous Infusions:    sodium chloride      sodium chloride       PRN Meds: HYDROmorphone **OR** HYDROmorphone, hydrOXYzine, oxyCODONE **OR** oxyCODONE, sodium chloride flush, sodium chloride, bisacodyl, polyethylene glycol, sodium chloride flush, sodium chloride, potassium chloride **OR** potassium alternative oral replacement **OR** potassium chloride, magnesium sulfate, ondansetron **OR** ondansetron, acetaminophen **OR** acetaminophen, sodium chloride flush    Data:     Past Medical History:   has no past medical history on file.    Social History:   reports that he has quit smoking. His smoking use included cigarettes. He has never used smokeless tobacco. He reports current alcohol use. He reports current drug use. Frequency: 7.00 times per week. Drug: Marijuana (Weed).     Family History: History reviewed. No pertinent family history.    Vitals:  BP (!) 125/98   Pulse 81   Temp 97.9 °F (36.6 °C) (Oral)   Resp 19   Ht 1.727 m (5' 7.99\")   Wt 57 kg (125 lb 10.6 oz)   SpO2 95%   BMI 19.11 kg/m²   Temp (24hrs), Av °F (36.7 °C), Min:97.9 °F (36.6 °C), Max:98.1 °F (36.7 °C)    No results for input(s): \"POCGLU\" in the last 72 hours.      I/O (24Hr):    Intake/Output Summary (Last 24 hours) at 2024 1439  Last data filed at 2024 0933  Gross per 24 hour   Intake 5 ml   Output 700 ml   Net -695 ml         Labs:  Hematology:  Recent Labs     24  0918 24  0614   WBC 8.0 6.2   RBC 3.99* 4.45   HGB 11.6* 11.6*   HCT 32.2* 36.8*   MCV 80.7* 82.7   MCH 29.1 26.1   MCHC 36.0* 31.5  Behavior is cooperative.         Assessment:        Hospital Problems             Last Modified POA    * (Principal) Cervical stenosis of spinal canal 1/12/2024 Yes    Essential (primary) hypertension 1/13/2024 Yes    Hyperlipidemia, unspecified 1/13/2024 Yes    Displaced fracture of acromial process, right shoulder, initial encounter for closed fracture 1/13/2024 Yes    Rash and other nonspecific skin eruption 1/13/2024 Yes    Cervical myelopathy (HCC) 1/13/2024 Yes    Ankle wound, left, initial encounter 1/13/2024 Yes    Pre-operative clearance 1/14/2024 Yes    S/P cervical spinal fusion 1/19/2024 Yes     Plan:          Cervical stenosis: Neurology and neurosurgery following case.  Neurosurgery requesting ID and cardiology clearance prior to surgical intervention.  Deemed low to intermediate risk for scheduled procedure per cardiology evaluation without further recommendations from a cardiac standpoint.  Underwent C3-C6 laminectomy and C3-T1 fusion on 1/16/2024.  Continue PT/OT  Continue pain control    Recurrent fall and worsening balance: Evaluated by neurology at [place], workup revealed the cervical stenosis \"the patient facility.  Will put referral on discharge to continue to follow-up in the office    Primary hypertension: Blood pressure stable on Coreg    Dyslipidemia: Continue statin    right shoulder arthritis and nondisplaced acromion fracture.  Seen orthopedic surgery before transfer here from [facility], recommendation is for sling, nonweightbearing  Per Ortho note   ( -I discussed with him at this point I think this should heal without any surgical intervention.  I am okay with him using the arm for activities of daily living that do not require elevation of the arm.  For example he can use it to help eat or dress himself.  I am okay with him using this on a walker for balance but I want him to put any weight through the arm.  I do think he has previous injury to the shoulder.  He

## 2024-01-20 NOTE — PROGRESS NOTES
Occupational Therapy  Facility/Department: [address] ORTHO/MED SURG  Occupational Therapy Daily Treatment Note    Name: Moises Triplett  : 1955  MRN: [id number]  Date of Service: 2024    Discharge Recommendations:  Further therapy recommended at discharge.The patient should be able to tolerate at least 3 hours of therapy per day over 5 days or 15 hours over 7 days.   This patient may benefit from a Physical Medicine and Rehab consult          Patient Diagnosis(es): The primary encounter diagnosis was S/P cervical spinal fusion. Diagnoses of Cervical stenosis of spinal canal, Multiple falls, and Closed nondisplaced fracture of acromial process of right scapula, initial encounter were also pertinent to this visit.  Past Medical History:  has no past medical history on file.  Past Surgical History:  has a past surgical history that includes laminectomy (2024) and cervical fusion (N/A, 2024).           Assessment   Performance deficits / Impairments: Decreased functional mobility ;Decreased ADL status;Decreased endurance;Decreased high-level IADLs;Decreased strength;Decreased ROM;Decreased balance;Decreased cognition;Decreased safe awareness;Decreased coordination  Assessment: Pt engaged in bed mobility MIN A x2 and STS transfers w/ SS (MIN A x1 from EOB, MIN A x2 from toilet). Pt demos fear of falling and goes into trunk extension when sitting back onto EOB. Pt completed toilet transfer using SS and required assist for brief/darius care. Pt completed grooming ADLs standing SS w/ MIN<.>CGA for standing balance when SS pads not being used. Pt required min/mod vc's to maintain NWB of RUE during transfers and assist for reaching LUE to hold SS bar. Pt limited in ADL participation this date d/t decreased FMC coordination and decreased balance. Pt would benefit from continued OT to address above deficits.  Prognosis: Fair  Activity Tolerance  Activity Tolerance: Patient Tolerated treatment well  Activity  skilled physical assistance of two professionals to simultaneously address individualized discipline goals. OT is addressing ADLs, grooming tasks, and sitting balance , while PT is addressing their individualized functional mobility task.      Therapy Time   Individual Concurrent Group Co-treatment   Time In 0831         Time Out 0933         Minutes 62         Timed Code Treatment Minutes: 50 Minutes       RICHY Wells/MILI

## 2024-01-20 NOTE — CARE COORDINATION
Transitional planning    Writer to room to discuss d/c plan, patient would like referrals to [facility], [facility] , sent.      1045 call from Manuel, cannot accept at [facility].

## 2024-01-21 PROCEDURE — 1200000000 HC SEMI PRIVATE

## 2024-01-21 PROCEDURE — 99232 SBSQ HOSP IP/OBS MODERATE 35: CPT | Performed by: INTERNAL MEDICINE

## 2024-01-21 PROCEDURE — 6370000000 HC RX 637 (ALT 250 FOR IP): Performed by: INTERNAL MEDICINE

## 2024-01-21 PROCEDURE — 6360000002 HC RX W HCPCS: Performed by: PHYSICIAN ASSISTANT

## 2024-01-21 PROCEDURE — 6370000000 HC RX 637 (ALT 250 FOR IP): Performed by: NURSE PRACTITIONER

## 2024-01-21 PROCEDURE — 99231 SBSQ HOSP IP/OBS SF/LOW 25: CPT | Performed by: FAMILY MEDICINE

## 2024-01-21 PROCEDURE — 6370000000 HC RX 637 (ALT 250 FOR IP): Performed by: FAMILY MEDICINE

## 2024-01-21 PROCEDURE — 6370000000 HC RX 637 (ALT 250 FOR IP): Performed by: PHYSICIAN ASSISTANT

## 2024-01-21 PROCEDURE — 2580000003 HC RX 258: Performed by: PHYSICIAN ASSISTANT

## 2024-01-21 RX ADMIN — METHOCARBAMOL TABLETS 500 MG: 500 TABLET, COATED ORAL at 08:24

## 2024-01-21 RX ADMIN — OXYCODONE HYDROCHLORIDE 10 MG: 5 TABLET ORAL at 20:41

## 2024-01-21 RX ADMIN — ACETAMINOPHEN 325MG 650 MG: 325 TABLET ORAL at 08:24

## 2024-01-21 RX ADMIN — SODIUM CHLORIDE, PRESERVATIVE FREE 10 ML: 5 INJECTION INTRAVENOUS at 20:44

## 2024-01-21 RX ADMIN — SODIUM CHLORIDE, PRESERVATIVE FREE 10 ML: 5 INJECTION INTRAVENOUS at 02:55

## 2024-01-21 RX ADMIN — FAMOTIDINE 20 MG: 20 TABLET, FILM COATED ORAL at 08:23

## 2024-01-21 RX ADMIN — MUPIROCIN: 20 OINTMENT TOPICAL at 08:25

## 2024-01-21 RX ADMIN — SODIUM CHLORIDE, PRESERVATIVE FREE 10 ML: 5 INJECTION INTRAVENOUS at 08:25

## 2024-01-21 RX ADMIN — POLYETHYLENE GLYCOL 3350 17 G: 17 POWDER, FOR SOLUTION ORAL at 08:23

## 2024-01-21 RX ADMIN — ATORVASTATIN CALCIUM 10 MG: 10 TABLET, FILM COATED ORAL at 08:24

## 2024-01-21 RX ADMIN — OXYCODONE HYDROCHLORIDE 10 MG: 5 TABLET ORAL at 05:41

## 2024-01-21 RX ADMIN — METHOCARBAMOL TABLETS 500 MG: 500 TABLET, COATED ORAL at 14:01

## 2024-01-21 RX ADMIN — SODIUM CHLORIDE, PRESERVATIVE FREE 10 ML: 5 INJECTION INTRAVENOUS at 20:41

## 2024-01-21 RX ADMIN — CARVEDILOL 6.25 MG: 6.25 TABLET, FILM COATED ORAL at 17:29

## 2024-01-21 RX ADMIN — CARVEDILOL 6.25 MG: 6.25 TABLET, FILM COATED ORAL at 08:23

## 2024-01-21 RX ADMIN — MUPIROCIN: 20 OINTMENT TOPICAL at 20:41

## 2024-01-21 RX ADMIN — ENOXAPARIN SODIUM 40 MG: 100 INJECTION SUBCUTANEOUS at 08:24

## 2024-01-21 RX ADMIN — LISINOPRIL 10 MG: 10 TABLET ORAL at 08:23

## 2024-01-21 RX ADMIN — ACETAMINOPHEN 325MG 650 MG: 325 TABLET ORAL at 14:01

## 2024-01-21 RX ADMIN — METHOCARBAMOL TABLETS 500 MG: 500 TABLET, COATED ORAL at 20:42

## 2024-01-21 RX ADMIN — ACETAMINOPHEN 325MG 650 MG: 325 TABLET ORAL at 20:41

## 2024-01-21 RX ADMIN — OXYCODONE HYDROCHLORIDE 10 MG: 5 TABLET ORAL at 14:02

## 2024-01-21 ASSESSMENT — PAIN DESCRIPTION - LOCATION
LOCATION: NECK;SHOULDER
LOCATION: NECK
LOCATION: NECK;SHOULDER;HAND

## 2024-01-21 ASSESSMENT — PAIN SCALES - GENERAL
PAINLEVEL_OUTOF10: 7
PAINLEVEL_OUTOF10: 6
PAINLEVEL_OUTOF10: 5
PAINLEVEL_OUTOF10: 8
PAINLEVEL_OUTOF10: 7
PAINLEVEL_OUTOF10: 5

## 2024-01-21 ASSESSMENT — PAIN DESCRIPTION - DESCRIPTORS: DESCRIPTORS: ACHING;SHARP;JABBING

## 2024-01-21 NOTE — PROGRESS NOTES
[facility]  Office: [phone]  Chapin Muse DO, Cyrus Dunn DO, Franky Jules DO, Danny Sexton DO, Luis Angel Gracia MD, Julee Quiñones MD, Delroy Landeros MD, Madison Collier MD,  Tony Rosales MD, Joni Dubois MD, Soila Oliva MD,  Neftali Larkin DO, Alvaro Maria MD, Juan Luis Faustin MD, Torin Muse DO, Eden Barajas MD,  Marquis Lemus DO, Yarely Molina MD, Maya West MD, Karen Adler MD, Shyam Beckman MD,  Rojas Neri MD, Axel Tello MD, Ryan Batista MD, Tima Hoffmann MD, Michael Grimes MD, Angus Hunt MD, Ramiro Burns DO, Bhupinder Mao DO, Mari Villasenor MD,  Davion Graves MD, Shirley Waterhouse, CNP,  Alycia De La Fuente CNP, Tavon Watkins, CNP,  Katharine Merrill, LACHO, Laura Small, CNP, Brenda Jade, CNP, Malena Rossi CNP, Marya Watson CNP, Kortney Wilson, CNP, Zabrina Obrien, PA-C, Soila Barber PA-C, Tia Mohan, CNP, Betina Saldivar, CNS, Carmen Worley, CNP, Mamie Crain CNP, Tracy Schwab, CNP         [facility]   IN-PATIENT SERVICE   [facility]    Progress Note    1/21/2024    2:11 PM    Name:   Moises Triplett  MRN:     [id number]     Acct:      [id number]   Room:   0246/0246-01   Day:  9  Admit Date:  1/12/2024  5:47 PM    PCP:   Amparo Bradford APRN - NP  Code Status:  Full Code    Subjective:     C/C: : Right shoulder pain, frequent falls     Interval History Status: improved.     Patient seen and examined at bedside, no acute events overnight.   No acute complaint  Patient vitals, labs and all providers notes were reviewed,from overnight shift and morning updates were noted and discussed with the nurse      Brief History:     Per chart  Moises Triplett is a 68 y.o. Non- / non  male who initially presented to [place] ED 1/8/24 for frequent falls. Patient has a medical history significant for hyperlipidemia and osteoarthritis.   Patient states he has had a tough time moving around and  polyethylene glycol  17 g Oral Daily    methocarbamol  500 mg Oral TID    enoxaparin  40 mg SubCUTAneous Daily    mupirocin   Each Nostril BID    atorvastatin  10 mg Oral Daily    sodium chloride flush  5-40 mL IntraVENous 2 times per day    nicotine  1 patch TransDERmal Daily     Continuous Infusions:    sodium chloride      sodium chloride       PRN Meds: HYDROmorphone **OR** HYDROmorphone, hydrOXYzine, oxyCODONE **OR** oxyCODONE, sodium chloride flush, sodium chloride, bisacodyl, polyethylene glycol, sodium chloride flush, sodium chloride, potassium chloride **OR** potassium alternative oral replacement **OR** potassium chloride, magnesium sulfate, ondansetron **OR** ondansetron, acetaminophen **OR** acetaminophen, sodium chloride flush    Data:     Past Medical History:   has no past medical history on file.    Social History:   reports that he has quit smoking. His smoking use included cigarettes. He has never used smokeless tobacco. He reports current alcohol use. He reports current drug use. Frequency: 7.00 times per week. Drug: Marijuana (Weed).     Family History: History reviewed. No pertinent family history.    Vitals:  BP (!) 135/94   Pulse 76   Temp 97.8 °F (36.6 °C) (Oral)   Resp 18   Ht 1.727 m (5' 7.99\")   Wt 57 kg (125 lb 10.6 oz)   SpO2 96%   BMI 19.11 kg/m²   Temp (24hrs), Av.8 °F (36.6 °C), Min:97.7 °F (36.5 °C), Max:97.8 °F (36.6 °C)    No results for input(s): \"POCGLU\" in the last 72 hours.      I/O (24Hr):    Intake/Output Summary (Last 24 hours) at 2024 1411  Last data filed at 2024 1337  Gross per 24 hour   Intake 360 ml   Output 250 ml   Net 110 ml         Labs:  Hematology:  Recent Labs     24   WBC 6.2   RBC 4.45   HGB 11.6*   HCT 36.8*   MCV 82.7   MCH 26.1   MCHC 31.5   RDW 19.0*   *   MPV 8.6       Chemistry:  Recent Labs     24  0614   *   K 3.8   CL 96*   CO2 25   GLUCOSE 100*   BUN 16   CREATININE 0.4*   ANIONGAP 11   LABGLOM >60  of acromial process, right shoulder, initial encounter for closed fracture 1/13/2024 Yes    Rash and other nonspecific skin eruption 1/13/2024 Yes    Cervical myelopathy (HCC) 1/13/2024 Yes    Ankle wound, left, initial encounter 1/13/2024 Yes    Pre-operative clearance 1/14/2024 Yes    S/P cervical spinal fusion 1/19/2024 Yes   Plan:          Cervical stenosis: Neurology and neurosurgery following case.  Neurosurgery requesting ID and cardiology clearance prior to surgical intervention.  Deemed low to intermediate risk for scheduled procedure per cardiology evaluation without further recommendations from a cardiac standpoint.  Underwent C3-C6 laminectomy and C3-T1 fusion on 1/16/2024.  Continue PT/OT  Continue pain control    Recurrent fall and worsening balance: Evaluated by neurology at [place], workup revealed the cervical stenosis \"the patient facility.  Will put referral on discharge to continue to follow-up in the office    Primary hypertension: Blood pressure stable on Coreg    Dyslipidemia: Continue statin    right shoulder arthritis and nondisplaced acromion fracture.  Seen orthopedic surgery before transfer here from [facility], recommendation is for sling, nonweightbearing  Per Ortho note   ( -I discussed with him at this point I think this should heal without any surgical intervention.  I am okay with him using the arm for activities of daily living that do not require elevation of the arm.  For example he can use it to help eat or dress himself.  I am okay with him using this on a walker for balance but I want him to put any weight through the arm.  I do think he has previous injury to the shoulder.  He currently has a nondisplaced acromion fracture.  The sling can be for comfort and okay with him coming out of it.  I will see him in the office for follow-up in 6 weeks. )    Chronic right ankle osteomyelitis: Hardware removal was completed on in November 2022, follows with Dr. Amado [facility].

## 2024-01-21 NOTE — PROGRESS NOTES
[facility] -   Infectious diseases evaluation  admission date 1/12/2024    reason for consultation:   Surg clearance    Impression :   Current:  C spine stenosis w  multiple falls  S/p cervical spine surgery on 1/16  R ankle past ORIF 5/1/2022 w chronic osteomyelitis  - hardware removed 11/2022  Wound closed 2/15/23  Persistent open ankle wound  Saw Dr Amado at [facility]  Bacteria were MRSA S doxy, w a superficial Pseudomonas that did not need to be treated,  - keflex x few weeks - stopped  Back w cellulitis [facility],  I&D 6/16/23 .  Treated with daptomycin 4 weeks for MRSA Dr Amado - then AB were considered completed  Then developed c diff 6/30/23  Still has a left ankle open wound as post op - no AB needed  Now has 1 screw  in place on the other side of the infection, and no plate- has been off AB since 7/2023  Allergy to sulfa    Other:  Smoker - stopped smoking 6/21/23  Discussion / summary of stay / plan of care   His should not have deep osteomyelitis remaining  in the ankle   Wound taking a long while to close - it is now 50% smaller since 7/2023 according to the pt  I believe the risk of cross infection is moderate   In order to reduce that risk for surgery I suggest  Keep the ankle dressed and cleaned  Employee hibiclens shower before the surgery. Daily x 3 days start 1/14  Mupirocin daily nostrils and axillae x 7 days  Recommendations     S/p cervical spine surgery on 1/16/24  Keep wound dressed and clean  Keep nasal mupirocin x 7 days      Pt worried about his teeth- lots of plaque and no abscess suspected at this time - suggested 2 x per year cleaning and good regular oral hygiene    Infection Control Recommendations   [place] Precautions      Antimicrobial Stewardship Recommendations   Off AB    History of Present Illness:   Initial history:  Moises Triplett is a 68 y.o.-year-old male fall at home abd could not get up-\increasing falls recently -  CT neck suggested  diffuse degenrative disease w significant stenosis C3 - C6  NS eval for possible surgery    Hx of ankle fracture and plating 2022 w long term AB for osteomyelitis      ID called for clearance for the C procedure considering his open wound of the foot            Interval changes  1/21/2024   BP (!) 135/94   Pulse 76   Temp 97.8 °F (36.6 °C) (Oral)   Resp 18   Ht 1.727 m (5' 7.99\")   Wt 57 kg (125 lb 10.6 oz)   SpO2 96%   BMI 19.11 kg/m²      1/14  Patient doing well he has a sling in place more so to prevent him from doing movements he shouldnt be.  He is getting up and go to the bathroom.  Reviewed at length his recent hospital course.  At this time patient does not need antibiotics as he has no current infection just an open wound on his left lower leg that is bandaged.  He states that he is feeling well white blood cell count was 5.2.  Patient is afebrile denies any acute concerns.    1/15  He is doing well, does feel a bit tired. He did mention his dentition is poor and was concerned. Afebrile. Wound covered . WBC wnl. Plan for surgery tomorrow. Continuing without abx    1/16  Afebrile. Plan for cervical spine surgery today. Patient does have collagen dressing on his lower leg wound. This appears to remain not infected. He does feel better compared to yesterday and is eager to have this procedure done. He is optimized from an ID standpoint.     1/17  Patient had surgery yesterday and is currently in a cervical collar.  He does appear confused and although his speech is clear he is having some statements that are not quite right.  Patient has been afebrile overnight and tolerated the procedure yesterday well.    1/18  No fever and feels better  - ox 3 and lower neck drain in place ans left foot dressed  Labs reveiwed    1/19  Afebrile. Confusion resolved. Neck drain has been removed. He was working with PT. Pain is still about an 8/10. DC is pending    CURRENT EVALUATION :1/21/2024    Afebrile  VS  01/20/24  0614   *   K 3.8   CL 96*   CO2 25   BUN 16   CREATININE 0.4*       Hepatic Function Panel: No results for input(s): \"PROT\", \"LABALBU\", \"BILIDIR\", \"IBILI\", \"BILITOT\", \"ALKPHOS\", \"ALT\", \"AST\" in the last 72 hours.    No results for input(s): \"RPR\" in the last 72 hours.  No results for input(s): \"HIV\" in the last 72 hours.  No results for input(s): \"BC\" in the last 72 hours.  Lab Results   Component Value Date/Time    CREATININE 0.4 01/20/2024 06:14 AM    GLUCOSE 100 01/20/2024 06:14 AM    GLUCOSE 100 06/12/2023 02:26 PM       Detailed results:        Thank you for allowing us to participate in the care of this patient.Please call with questions.    This note is created with the assistance of a speech recognition program.  While intending to generate adocument that actually reflects the content of the visit, the document can still have some errors including those of syntax and sound a like substitutions which may escape proof reading.  It such instances, actual meaningcan be extrapolated by contextual diversion.    Christo Amador MD

## 2024-01-22 LAB — INTERVENTION: NORMAL

## 2024-01-22 PROCEDURE — 1200000000 HC SEMI PRIVATE

## 2024-01-22 PROCEDURE — 99231 SBSQ HOSP IP/OBS SF/LOW 25: CPT | Performed by: FAMILY MEDICINE

## 2024-01-22 PROCEDURE — 6360000002 HC RX W HCPCS: Performed by: PHYSICIAN ASSISTANT

## 2024-01-22 PROCEDURE — 6370000000 HC RX 637 (ALT 250 FOR IP): Performed by: NURSE PRACTITIONER

## 2024-01-22 PROCEDURE — 6370000000 HC RX 637 (ALT 250 FOR IP): Performed by: PHYSICIAN ASSISTANT

## 2024-01-22 PROCEDURE — 2580000003 HC RX 258: Performed by: PHYSICIAN ASSISTANT

## 2024-01-22 PROCEDURE — 6370000000 HC RX 637 (ALT 250 FOR IP): Performed by: INTERNAL MEDICINE

## 2024-01-22 PROCEDURE — 99232 SBSQ HOSP IP/OBS MODERATE 35: CPT | Performed by: INTERNAL MEDICINE

## 2024-01-22 PROCEDURE — 6370000000 HC RX 637 (ALT 250 FOR IP): Performed by: FAMILY MEDICINE

## 2024-01-22 RX ORDER — LISINOPRIL 10 MG/1
10 TABLET ORAL DAILY
Qty: 30 TABLET | Refills: 3 | Status: SHIPPED | OUTPATIENT
Start: 2024-01-23

## 2024-01-22 RX ADMIN — MUPIROCIN: 20 OINTMENT TOPICAL at 21:18

## 2024-01-22 RX ADMIN — METHOCARBAMOL TABLETS 500 MG: 500 TABLET, COATED ORAL at 14:05

## 2024-01-22 RX ADMIN — POLYETHYLENE GLYCOL 3350 17 G: 17 POWDER, FOR SOLUTION ORAL at 08:42

## 2024-01-22 RX ADMIN — SODIUM CHLORIDE, PRESERVATIVE FREE 10 ML: 5 INJECTION INTRAVENOUS at 08:46

## 2024-01-22 RX ADMIN — CARVEDILOL 6.25 MG: 6.25 TABLET, FILM COATED ORAL at 17:18

## 2024-01-22 RX ADMIN — ACETAMINOPHEN 325MG 650 MG: 325 TABLET ORAL at 14:05

## 2024-01-22 RX ADMIN — SODIUM CHLORIDE, PRESERVATIVE FREE 10 ML: 5 INJECTION INTRAVENOUS at 08:48

## 2024-01-22 RX ADMIN — SODIUM CHLORIDE, PRESERVATIVE FREE 10 ML: 5 INJECTION INTRAVENOUS at 20:34

## 2024-01-22 RX ADMIN — CARVEDILOL 6.25 MG: 6.25 TABLET, FILM COATED ORAL at 08:38

## 2024-01-22 RX ADMIN — LISINOPRIL 10 MG: 10 TABLET ORAL at 08:41

## 2024-01-22 RX ADMIN — OXYCODONE HYDROCHLORIDE 10 MG: 5 TABLET ORAL at 09:59

## 2024-01-22 RX ADMIN — OXYCODONE HYDROCHLORIDE 10 MG: 5 TABLET ORAL at 17:14

## 2024-01-22 RX ADMIN — METHOCARBAMOL TABLETS 500 MG: 500 TABLET, COATED ORAL at 20:43

## 2024-01-22 RX ADMIN — ACETAMINOPHEN 325MG 650 MG: 325 TABLET ORAL at 08:37

## 2024-01-22 RX ADMIN — ACETAMINOPHEN 325MG 650 MG: 325 TABLET ORAL at 02:36

## 2024-01-22 RX ADMIN — ENOXAPARIN SODIUM 40 MG: 100 INJECTION SUBCUTANEOUS at 08:40

## 2024-01-22 RX ADMIN — METHOCARBAMOL TABLETS 500 MG: 500 TABLET, COATED ORAL at 08:41

## 2024-01-22 RX ADMIN — ACETAMINOPHEN 325MG 650 MG: 325 TABLET ORAL at 20:43

## 2024-01-22 RX ADMIN — OXYCODONE HYDROCHLORIDE 10 MG: 5 TABLET ORAL at 02:36

## 2024-01-22 RX ADMIN — SODIUM CHLORIDE, PRESERVATIVE FREE 10 ML: 5 INJECTION INTRAVENOUS at 20:32

## 2024-01-22 RX ADMIN — FAMOTIDINE 20 MG: 20 TABLET, FILM COATED ORAL at 08:41

## 2024-01-22 RX ADMIN — ATORVASTATIN CALCIUM 10 MG: 10 TABLET, FILM COATED ORAL at 08:38

## 2024-01-22 RX ADMIN — OXYCODONE HYDROCHLORIDE 10 MG: 5 TABLET ORAL at 23:30

## 2024-01-22 ASSESSMENT — ENCOUNTER SYMPTOMS
EYE PAIN: 0
FACIAL SWELLING: 0
CHEST TIGHTNESS: 0
VOMITING: 0
PHOTOPHOBIA: 0
COLOR CHANGE: 0
CONSTIPATION: 0
NAUSEA: 0
SHORTNESS OF BREATH: 0
APNEA: 0
EYE ITCHING: 0
BLOOD IN STOOL: 0
EYE DISCHARGE: 0
ABDOMINAL DISTENTION: 0
BACK PAIN: 0
SINUS PRESSURE: 0
ABDOMINAL PAIN: 0
DIARRHEA: 0
WHEEZING: 0
EYE REDNESS: 0
COUGH: 0

## 2024-01-22 ASSESSMENT — PAIN DESCRIPTION - DESCRIPTORS
DESCRIPTORS: ACHING;DISCOMFORT;NAGGING
DESCRIPTORS: ACHING;DISCOMFORT;NAGGING
DESCRIPTORS: ACHING;DISCOMFORT
DESCRIPTORS: DISCOMFORT;ACHING
DESCRIPTORS: ACHING;DISCOMFORT;NAGGING

## 2024-01-22 ASSESSMENT — PAIN DESCRIPTION - ORIENTATION
ORIENTATION: RIGHT;LEFT
ORIENTATION: LEFT;RIGHT
ORIENTATION: RIGHT;LEFT
ORIENTATION: RIGHT;LEFT

## 2024-01-22 ASSESSMENT — PAIN DESCRIPTION - LOCATION
LOCATION: NECK
LOCATION: NECK;SHOULDER
LOCATION: HEAD;NECK;SHOULDER
LOCATION: NECK
LOCATION: NECK;SHOULDER;HAND
LOCATION: NECK;SHOULDER;HAND

## 2024-01-22 ASSESSMENT — PAIN - FUNCTIONAL ASSESSMENT: PAIN_FUNCTIONAL_ASSESSMENT: PREVENTS OR INTERFERES SOME ACTIVE ACTIVITIES AND ADLS

## 2024-01-22 ASSESSMENT — PAIN SCALES - GENERAL
PAINLEVEL_OUTOF10: 8
PAINLEVEL_OUTOF10: 9
PAINLEVEL_OUTOF10: 7
PAINLEVEL_OUTOF10: 7

## 2024-01-22 NOTE — PROGRESS NOTES
[facility]  Office: [phone]  Chapin Muse DO, Cyrus Dunn DO, Franky Jules DO, Danny Sexton DO, Luis Angel Gracia MD, Julee Quiñones MD, Delroy Landeros MD, Madison Collier MD,  Tony Rosales MD, Joni Dubois MD, Soila Oliva MD,  Neftali Larkin DO, Alvaro Maria MD, Juan Luis Faustin MD, Torin Muse DO, Eden Barajas MD,  Marquis Lemus DO, Yarely Molina MD, Maya West MD, Karen Adler MD, Shyam Beckman MD,  Rojas Neri MD, Axel Tello MD, Ryan Batista MD, Tima Hoffmann MD, Michael Grimes MD, Angus Hunt MD, Ramiro Burns DO, Bhupinder Mao DO, Mari Villasenor MD,  Davion Graves MD, Shirley Waterhouse, CNP,  Alycia De La Fuente CNP, Tavon Watkins, CNP,  Katharine Merrill, LACHO, Laura Small, CNP, Brenda Jade, CNP, Malena Rossi CNP, Marya Watson CNP, Kortney Wilson, CNP, Zabrina Obrien, PA-C, Soila Barber PA-C, iTa Mohan, CNP, Betina Saldivar, CNS, Carmen Worley, CNP, Mamie Crain CNP, Tracy Schwab, CNP         [facility]   IN-PATIENT SERVICE   [facility]    Progress Note    1/22/2024    3:34 PM    Name:   Moises Triplett  MRN:     [id number]     Acct:      [id number]   Room:   [facility]6/0246-01   Day:  10  Admit Date:  1/12/2024  5:47 PM    PCP:   Amparo Bradford APRN - NP  Code Status:  Full Code    Subjective:     C/C: : Right shoulder pain, frequent falls     Interval History Status: improved.     Patient seen and examined at bedside, no acute events overnight.   No acute complaint  Patient vitals, labs and all providers notes were reviewed,from overnight shift and morning updates were noted and discussed with the nurse      Brief History:     Per chart  Moises Triplett is a 68 y.o. Non- / non  male who initially presented to [place] ED 1/8/24 for frequent falls. Patient has a medical history significant for hyperlipidemia and osteoarthritis.   Patient states he has had a tough time moving around and  overlying shoulders on the flexion view. 3. Advanced multilevel spinal degenerative changes as noted previously.     XR CERVICAL SPINE FLEXION AND EXTENSION    Result Date: 1/13/2024  Severe multilevel degenerative spondylosis most prominent at C3-4, C4-5, C5-6, C6-7 and C7-T1. No evidence of instability.       Physical Examination:        Physical Exam  Vitals and nursing note reviewed.   Constitutional:       General: He is not in acute distress.     Interventions: Cervical collar in place.   HENT:      Head: Normocephalic and atraumatic.   Eyes:      Conjunctiva/sclera: Conjunctivae normal.      Pupils: Pupils are equal, round, and reactive to light.   Neck:      Comments: Drain still in place  Cardiovascular:      Rate and Rhythm: Normal rate and regular rhythm.      Heart sounds: No murmur heard.  Pulmonary:      Effort: Pulmonary effort is normal. No accessory muscle usage or respiratory distress.      Breath sounds: No stridor. No decreased breath sounds, wheezing, rhonchi or rales.   Abdominal:      General: Bowel sounds are normal. There is no distension.      Palpations: Abdomen is soft. Abdomen is not rigid.      Tenderness: There is no abdominal tenderness. There is no guarding.   Musculoskeletal:         General: No tenderness.      Comments: Left ankle in dressing   Skin:     General: Skin is warm and dry.      Findings: No erythema, lesion or rash.   Neurological:      Mental Status: He is alert and oriented to person, place, and time.      Cranial Nerves: No cranial nerve deficit.      Motor: No seizure activity.   Psychiatric:         Speech: Speech normal.         Behavior: Behavior normal. Behavior is cooperative.         Assessment:        Hospital Problems             Last Modified POA    * (Principal) Cervical stenosis of spinal canal 1/12/2024 Yes    Essential (primary) hypertension 1/13/2024 Yes    Hyperlipidemia, unspecified 1/13/2024 Yes    Displaced fracture of acromial process, right

## 2024-01-22 NOTE — DISCHARGE INSTR - COC
Continuity of Care Form    Patient Name: Moises Triplett   :  1955  MRN:  [id number]    Admit date:  2024  Discharge date:  24    Code Status Order: Full Code   Advance Directives:     Admitting Physician:  Madison Collier MD  PCP: Amparo Bradford APRN - NP    Discharging Nurse: ISMAEL  Discharging Hospital Unit/Room#: 0246/0246-01  Discharging Unit Phone Number: [phone]    Emergency Contact:   Extended Emergency Contact Information  Primary Emergency Contact: Dom Triplett  Mobile Phone: [phone]  Relation: Brother/Sister  Secondary Emergency Contact: Ana Brown  Mobile Phone: [phone]  Relation: Friend    Past Surgical History:  Past Surgical History:   Procedure Laterality Date    CERVICAL FUSION N/A 2024    C3-C6 LAMINECTOMY, C3-T1 FUSION performed by Angella Perales DO at [facility] OR    LAMINECTOMY  2024    c3-c6       Immunization History:   Immunization History   Administered Date(s) Administered    COVID-19, J&J, (age 18y+), IM, 0.5 mL 2021    TDaP, ADACEL (age 10y-64y), BOOSTRIX (age 10y+), IM, 0.5mL 2021       Active Problems:  Patient Active Problem List   Diagnosis Code    Generalized weakness R53.1    Alcohol abuse, uncomplicated F10.10    Essential (primary) hypertension I10    Hyperlipidemia, unspecified E78.5    Displaced fracture of acromial process, right shoulder, initial encounter for closed fracture S42.121A    Multiple falls R29.6    Rash and other nonspecific skin eruption R21    Cervical stenosis of spinal canal M48.02    Cervical myelopathy (HCC) G95.9    Ankle wound, left, initial encounter S91.002A    Pre-operative clearance Z01.818    S/P cervical spinal fusion Z98.1       Isolation/Infection:   Isolation            No Isolation          Patient Infection Status       None to display            Nurse Assessment:  Last Vital Signs: BP (!) 148/98   Pulse 75   Temp 97.9 °F (36.6 °C) (Oral)   Resp 18   Ht 1.727 m (5' 7.99\")   Wt 57 kg (125 lb 10.6 oz)    continuing treatment of the diagnosis listed and that he requires Skilled Nursing Facility for greater 30 days.     Update Admission H&P: Changes in H&P as follows -      PHYSICIAN SIGNATURE:  Electronically signed by Madison Collier MD on 1/22/24 at 3:33 PM EST

## 2024-01-22 NOTE — PROGRESS NOTES
Wound still dressed    1/22  Continues to be afebrile. Off abx, wound dressed. WBC wnl. C collar in place. DC is pending. Neck pain is well controlled. Using utensils this morning to feed himself. Oriented x3    Summary of relevant labs:  Labs:  W 5- 6.1  Creat 0.6    Micro:    Imaging:  C spine CT 1/13    I have personally reviewed the past medical history, past surgical history, medications, social history, and family history, and I haveupdated the database accordingly.      Allergies:   Latex and Sulfa antibiotics     Review of Systems:     Review of Systems   Constitutional:  Positive for activity change. Negative for chills, diaphoresis, fatigue and fever.   HENT:  Negative for congestion, ear pain, facial swelling and sinus pressure.    Eyes:  Negative for photophobia, pain, discharge, redness, itching and visual disturbance.   Respiratory:  Negative for apnea, cough, shortness of breath and wheezing.    Cardiovascular:  Negative for chest pain, palpitations and leg swelling.   Gastrointestinal:  Negative for abdominal distention, abdominal pain, constipation, diarrhea, nausea and vomiting.   Endocrine: Negative for cold intolerance, polydipsia, polyphagia and polyuria.   Genitourinary:  Negative for dysuria, frequency, hematuria and urgency.   Musculoskeletal:  Positive for neck pain. Negative for arthralgias and back pain.        Collar bothering him   Skin:  Negative for color change.   Allergic/Immunologic: Negative for immunocompromised state.   Neurological:  Negative for dizziness, syncope, light-headedness, numbness and headaches.   Hematological:  Negative for adenopathy.   Psychiatric/Behavioral:  Negative for agitation and behavioral problems.        Physical Examination :       Physical Exam  Constitutional:       General: He is not in acute distress.     Appearance: Normal appearance. He is not ill-appearing, toxic-appearing or diaphoretic.   HENT:      Head: Normocephalic and atraumatic.       Comments: C collar on      Nose: Nose normal. No congestion or rhinorrhea.      Mouth/Throat:      Mouth: Mucous membranes are moist.      Pharynx: No oropharyngeal exudate.      Comments: Poor denitition  Eyes:      General: No scleral icterus.     Extraocular Movements: Extraocular movements intact.      Conjunctiva/sclera: Conjunctivae normal.   Neck:      Comments: Cervical collar in place  Cardiovascular:      Rate and Rhythm: Normal rate and regular rhythm.      Heart sounds: Normal heart sounds. No murmur heard.     No friction rub. No gallop.   Pulmonary:      Effort: No respiratory distress.      Breath sounds: Normal breath sounds. No stridor. No wheezing, rhonchi or rales.   Chest:      Chest wall: No tenderness.   Abdominal:      General: There is no distension.      Palpations: Abdomen is soft. There is no mass.      Tenderness: There is no abdominal tenderness.   Genitourinary:     Comments: No camejo  Musculoskeletal:         General: No swelling or deformity.      Cervical back: Neck supple. No rigidity or tenderness.      Right lower leg: No edema.      Left lower leg: No edema.      Comments: C Collar in place    Skin:     Coloration: Skin is not jaundiced or pale.      Findings: No bruising or erythema.      Comments: LLE leg wound dressed   Neurological:      General: No focal deficit present.      Mental Status: He is alert and oriented to person, place, and time.      Cranial Nerves: No cranial nerve deficit.      Sensory: No sensory deficit.   Psychiatric:         Mood and Affect: Mood normal.         Behavior: Behavior normal.         Thought Content: Thought content normal.         Past Medical History:   History reviewed. No pertinent past medical history.    Past Surgical  History:     Past Surgical History:   Procedure Laterality Date    CERVICAL FUSION N/A 1/16/2024    C3-C6 LAMINECTOMY, C3-T1 FUSION performed by Angella Perales DO at [facility] [place] OR    LAMINECTOMY  01/16/2024    c3-c6    Medical Decision Making:   I have independently reviewed/ordered the following labs:    CBC with Differential:   Recent Labs     01/20/24  0614   WBC 6.2   HGB 11.6*   HCT 36.8*   *   LYMPHOPCT 28   MONOPCT 10       BMP:  Recent Labs     01/20/24  0614   *   K 3.8   CL 96*   CO2 25   BUN 16   CREATININE 0.4*       Hepatic Function Panel: No results for input(s): \"PROT\", \"LABALBU\", \"BILIDIR\", \"IBILI\", \"BILITOT\", \"ALKPHOS\", \"ALT\", \"AST\" in the last 72 hours.    No results for input(s): \"RPR\" in the last 72 hours.  No results for input(s): \"HIV\" in the last 72 hours.  No results for input(s): \"BC\" in the last 72 hours.  Lab Results   Component Value Date/Time    CREATININE 0.4 01/20/2024 06:14 AM    GLUCOSE 100 01/20/2024 06:14 AM    GLUCOSE 100 06/12/2023 02:26 PM       Detailed results:        Thank you for allowing us to participate in the care of this patient.Please call with questions.    This note is created with the assistance of a speech recognition program.  While intending to generate adocument that actually reflects the content of the visit, the document can still have some errors including those of syntax and sound a like substitutions which may escape proof reading.  It such instances, actual meaningcan be extrapolated by contextual diversion.        Desmond Barreto MD  Family Medicine Resident, PGY-2   01/22/24             I have discussed the care of the patient, including pertinent history and exam findings,  with the resident., student or CNP- I have seen and examined the patient and the key elements of all parts of the encounter have been performed by me.  I agree with the assessment, plan and orders as documented by the resident.student or CNP    Ilene Heard, Infectious Diseases

## 2024-01-23 VITALS
HEIGHT: 68 IN | WEIGHT: 125.66 LBS | DIASTOLIC BLOOD PRESSURE: 92 MMHG | BODY MASS INDEX: 19.05 KG/M2 | HEART RATE: 88 BPM | RESPIRATION RATE: 18 BRPM | OXYGEN SATURATION: 97 % | SYSTOLIC BLOOD PRESSURE: 168 MMHG | TEMPERATURE: 97.3 F

## 2024-01-23 PROBLEM — A49.02 MRSA INFECTION, NON-INVASIVE: Status: ACTIVE | Noted: 2024-01-23

## 2024-01-23 PROCEDURE — 6370000000 HC RX 637 (ALT 250 FOR IP): Performed by: NURSE PRACTITIONER

## 2024-01-23 PROCEDURE — 6370000000 HC RX 637 (ALT 250 FOR IP): Performed by: FAMILY MEDICINE

## 2024-01-23 PROCEDURE — 97110 THERAPEUTIC EXERCISES: CPT

## 2024-01-23 PROCEDURE — 97535 SELF CARE MNGMENT TRAINING: CPT

## 2024-01-23 PROCEDURE — 6370000000 HC RX 637 (ALT 250 FOR IP): Performed by: PHYSICIAN ASSISTANT

## 2024-01-23 PROCEDURE — 2580000003 HC RX 258: Performed by: PHYSICIAN ASSISTANT

## 2024-01-23 PROCEDURE — 99232 SBSQ HOSP IP/OBS MODERATE 35: CPT | Performed by: INTERNAL MEDICINE

## 2024-01-23 PROCEDURE — 99239 HOSP IP/OBS DSCHRG MGMT >30: CPT | Performed by: STUDENT IN AN ORGANIZED HEALTH CARE EDUCATION/TRAINING PROGRAM

## 2024-01-23 PROCEDURE — 6360000002 HC RX W HCPCS: Performed by: PHYSICIAN ASSISTANT

## 2024-01-23 PROCEDURE — 97116 GAIT TRAINING THERAPY: CPT

## 2024-01-23 PROCEDURE — 6370000000 HC RX 637 (ALT 250 FOR IP): Performed by: INTERNAL MEDICINE

## 2024-01-23 PROCEDURE — 97530 THERAPEUTIC ACTIVITIES: CPT

## 2024-01-23 RX ORDER — OXYCODONE HYDROCHLORIDE AND ACETAMINOPHEN 5; 325 MG/1; MG/1
2 TABLET ORAL EVERY 6 HOURS PRN
Qty: 28 TABLET | Refills: 0 | Status: SHIPPED | OUTPATIENT
Start: 2024-01-23 | End: 2024-01-30

## 2024-01-23 RX ADMIN — ACETAMINOPHEN 325MG 650 MG: 325 TABLET ORAL at 13:42

## 2024-01-23 RX ADMIN — ATORVASTATIN CALCIUM 10 MG: 10 TABLET, FILM COATED ORAL at 08:40

## 2024-01-23 RX ADMIN — ACETAMINOPHEN 325MG 650 MG: 325 TABLET ORAL at 08:40

## 2024-01-23 RX ADMIN — SODIUM CHLORIDE, PRESERVATIVE FREE 10 ML: 5 INJECTION INTRAVENOUS at 08:41

## 2024-01-23 RX ADMIN — LISINOPRIL 10 MG: 10 TABLET ORAL at 08:40

## 2024-01-23 RX ADMIN — MUPIROCIN: 20 OINTMENT TOPICAL at 08:40

## 2024-01-23 RX ADMIN — METHOCARBAMOL TABLETS 500 MG: 500 TABLET, COATED ORAL at 08:40

## 2024-01-23 RX ADMIN — ACETAMINOPHEN 325MG 650 MG: 325 TABLET ORAL at 02:27

## 2024-01-23 RX ADMIN — OXYCODONE HYDROCHLORIDE 10 MG: 5 TABLET ORAL at 07:45

## 2024-01-23 RX ADMIN — FAMOTIDINE 20 MG: 20 TABLET, FILM COATED ORAL at 08:44

## 2024-01-23 RX ADMIN — ENOXAPARIN SODIUM 40 MG: 100 INJECTION SUBCUTANEOUS at 08:41

## 2024-01-23 RX ADMIN — CARVEDILOL 6.25 MG: 6.25 TABLET, FILM COATED ORAL at 08:40

## 2024-01-23 RX ADMIN — POLYETHYLENE GLYCOL 3350 17 G: 17 POWDER, FOR SOLUTION ORAL at 08:43

## 2024-01-23 RX ADMIN — OXYCODONE HYDROCHLORIDE 10 MG: 5 TABLET ORAL at 13:42

## 2024-01-23 RX ADMIN — METHOCARBAMOL TABLETS 500 MG: 500 TABLET, COATED ORAL at 13:42

## 2024-01-23 ASSESSMENT — ENCOUNTER SYMPTOMS
NAUSEA: 0
WHEEZING: 0
SHORTNESS OF BREATH: 0
EYE REDNESS: 0
BACK PAIN: 0
PHOTOPHOBIA: 0
COLOR CHANGE: 0
COUGH: 0
VOMITING: 0
CONSTIPATION: 0
FACIAL SWELLING: 0
EYE DISCHARGE: 0
ABDOMINAL PAIN: 0
EYE PAIN: 0
APNEA: 0
ABDOMINAL DISTENTION: 0
DIARRHEA: 0
SINUS PRESSURE: 0
EYE ITCHING: 0

## 2024-01-23 ASSESSMENT — PAIN SCALES - GENERAL
PAINLEVEL_OUTOF10: 8
PAINLEVEL_OUTOF10: 6
PAINLEVEL_OUTOF10: 8
PAINLEVEL_OUTOF10: 9

## 2024-01-23 ASSESSMENT — PAIN DESCRIPTION - LOCATION
LOCATION: NECK
LOCATION: NECK;SHOULDER

## 2024-01-23 NOTE — PLAN OF CARE
Problem: Safety - Adult  Goal: Free from fall injury  Outcome: Progressing  Flowsheets (Taken 1/22/2024 1938)  Free From Fall Injury:   Instruct family/caregiver on patient safety   Based on caregiver fall risk screen, instruct family/caregiver to ask for assistance with transferring infant if caregiver noted to have fall risk factors     Problem: Discharge Planning  Goal: Discharge to home or other facility with appropriate resources  Outcome: Progressing     Problem: Pain  Goal: Verbalizes/displays adequate comfort level or baseline comfort level  Outcome: Progressing     Problem: Skin/Tissue Integrity  Goal: Absence of new skin breakdown  Description: 1.  Monitor for areas of redness and/or skin breakdown  2.  Assess vascular access sites hourly  3.  Every 4-6 hours minimum:  Change oxygen saturation probe site  4.  Every 4-6 hours:  If on nasal continuous positive airway pressure, respiratory therapy assess nares and determine need for appliance change or resting period.  Outcome: Progressing     Problem: ABCDS Injury Assessment  Goal: Absence of physical injury  Outcome: Progressing  Flowsheets (Taken 1/22/2024 1938)  Absence of Physical Injury: Implement safety measures based on patient assessment

## 2024-01-23 NOTE — PROGRESS NOTES
Occupational Therapy  Facility/Department: [address] ORTHO/MED SURG  Occupational Daily Treatment Note     Name: Moises Triplett  : 1955  MRN: [id number]  Date of Service: 2024    Discharge Recommendations:  Patient would benefit from continued therapy after discharge Further therapy recommended at discharge.The patient should be able to tolerate at least 3 hours of therapy per day over 5 days or 15 hours over 7 days.   This patient may benefit from a Physical Medicine and Rehab consult.    Patient Diagnosis(es): The primary encounter diagnosis was S/P cervical spinal fusion. Diagnoses of Cervical stenosis of spinal canal, Multiple falls, and Closed nondisplaced fracture of acromial process of right scapula, initial encounter were also pertinent to this visit.  Past Medical History:  has no past medical history on file.  Past Surgical History:  has a past surgical history that includes laminectomy (2024) and cervical fusion (N/A, 2024).  Assessment   Performance deficits / Impairments: Decreased functional mobility ;Decreased ADL status;Decreased endurance;Decreased high-level IADLs;Decreased strength;Decreased ROM;Decreased balance;Decreased cognition;Decreased safe awareness;Decreased coordination;Decreased fine motor control;Decreased posture  Prognosis: Fair  Activity Tolerance  Activity Tolerance: Patient Tolerated treatment well;Patient limited by pain  Activity Tolerance Comments: Fearful of falling.        Plan   Occupational Therapy Plan  Times Per Week: 3-5x/wk  Current Treatment Recommendations: Balance training, Functional mobility training, Safety education & training, Patient/Caregiver education & training, Equipment evaluation, education, & procurement, Self-Care / ADL     Restrictions  Restrictions/Precautions  Restrictions/Precautions: Fall Risk, Weight Bearing, Up as Tolerated  Required Braces or Orthoses?: Yes  Upper Extremity Weight Bearing Restrictions  Right Upper Extremity  mod A to wash neck d/t cervical precautions/c-collar.)  LE Dressing: Maximum assistance (Don slippers d/t limited reach.)  Additional Comments: Pt supine in bed upon therapist arrival. Pt completed supine/sit transfer to seated EOB. Pt fearful of falling requiring emotional support/encouragement throughout session with fair/good return. Sit/stand transfer using RW for static standing. Transfers atempted x4. Stand/pivot transfer using RW EOB/chair. ADL care completed seated in chair. Pt educated on adaptive techniques for increased independence with ADLs with good return. Pt educated/reminded throughout session on weight bearing restrictions RUE with good return. Pt retired to seated in chair at end of session with all needs met.     Bed mobility  Supine to Sit: Moderate assistance (Trunk support. Pt able to advance BLE to EOB.)  Scooting: Minimal assistance  Bed Mobility Comments: HOB elevated. Increased time needed to complete. Pt fearful of falling d/t slippery feeling of bed linens. Verbal cues to maintain NWB RUE with fair return.  Transfers  Sit to stand: Moderate assistance;2 Person assistance  Stand to sit: Moderate assistance;2 Person assistance  Transfer Comments: Transfers completed x4 from EOB; 1 from edge of chair. L foot blocked to prevent sliding in towards center causing ankle to roll. Pt fearful of falling. Max encouragement/emotional support with fair/good return. Stance improved with each attempt. RUE blocked during transfers to prevent bearing weight bearing during transfers with fair return.     Cognition  Overall Cognitive Status: Exceptions  Arousal/Alertness: Appropriate responses to stimuli  Following Commands: Follows multistep commands with increased time;Follows one step commands consistently  Attention Span: Attends with cues to redirect  Memory: Appears intact  Safety Judgement: Decreased awareness of need for assistance;Decreased awareness of need for safety  Problem Solving: Assistance

## 2024-01-23 NOTE — PROGRESS NOTES
[facility]  Office: [phone]  Chapin Muse DO, Cyrus Dunn DO, Franky Jules DO, Danny Sexton DO, Luis Angel Gracia MD, Julee Quiñones MD, Delroy Landeros MD, Madison Collier MD,  Tony Rosales MD, Joni Dubois MD, Soila Oliva MD,  Neftali Lrakin DO, Alvaro Maria MD, Juan Luis Faustin MD, Torin Muse DO, Eden Barajas MD,  Marquis Lemus DO, Yarely Molina MD, Maya West MD, Karen Adler MD, Shyam Beckman MD,  Rojas Neri MD, Axel Tello MD, Ryan Batista MD, Tima Hoffmann MD, Michael Grimes MD, Angus Hunt MD, Ramiro Burns DO, Bhupinder Mao DO, Mari Villasenor MD,  Davion Graves MD, Shirley Waterhouse, CNP,  Alycia De La Fuente CNP, Tavon Watkins, CNP,  Katharine Merrill, LACHO, Laura Small, CNP, Brenda Jade, CNP, Malena Rossi CNP, Marya Watson, CNP, Kortney Wilson, CNP, Zabrina Obrien, PA-C, Soila Barber, PA-C, Tia Mohan, CNP, Betina Saldivar, CNS, Carmen Worley, CNP, Mamie Crain, CNP, Tracy Schwab, CNP         [facility]   IN-PATIENT SERVICE   [facility]    Progress Note    1/23/2024    12:19 PM    Name:   Moises Triplett  MRN:     [id number]     Acct:      [id number]   Room:   [facility]6/0246-01   Day:  11  Admit Date:  1/12/2024  5:47 PM    PCP:   Amparo Bradford APRN - NP  Code Status:  Full Code    Subjective:     C/C: Right shoulder Pain, frequent falls     Interval History Status: improved.     Patient seen and examined at bedside this morning. No acute events overnight. Patient resting. Currently having pain but well managed at this time. Denies any N/V, CP or SOB. Tolerating oral diet and having BMs    Brief History:     Per chart  Moises Triplett is a 68 y.o. Non- / non  male who initially presented to [place] ED 1/8/24 for frequent falls. Patient has a medical history significant for hyperlipidemia and osteoarthritis.   Patient states he has had a tough time moving around and maintaining his balance.  He reports this has been ongoing for months. He presented to [place] ED after several falls.  Patient was admitted to [facility] due to frequent falls and need for ARU placement. MRI of the brain and cervical spine were done due to headache, dizziness, and physical debility. Patient was noted to be hypertensive and was started on Coreg BID. Patient was also seen by orthopedic surgery for right acromion fracture.  MRI brain suggestive of chronic microvascular disease. MRI cervical spine concerning for severe canal stenosis and multilevel degenerative disease. Patient was transferred to [place] for neurosurgery consultation. Patient admitted to hospital team for the management of Cervical stenosis of spinal canal.  Underwent  C3-6 laminectomy and C3-T1 fusion 1/16/2024    Review of Systems:     Constitutional:  negative for chills, fevers, sweats  Respiratory:  negative for cough, dyspnea on exertion, shortness of breath, wheezing  Cardiovascular:  negative for chest pain, chest pressure/discomfort, lower extremity edema, palpitations  Gastrointestinal:  negative for abdominal pain, constipation, diarrhea, nausea, vomiting  Neurological:  negative for dizziness, headache  MSK: neck pain    Medications:     Allergies:    Allergies   Allergen Reactions    Latex Rash    Sulfa Antibiotics Rash       Current Meds:   Scheduled Meds:    mupirocin   Topical BID    lisinopril  10 mg Oral Daily    carvedilol  6.25 mg Oral BID WC    famotidine  20 mg Oral Daily    sodium chloride flush  5-40 mL IntraVENous 2 times per day    acetaminophen  650 mg Oral Q6H    polyethylene glycol  17 g Oral Daily    methocarbamol  500 mg Oral TID    enoxaparin  40 mg SubCUTAneous Daily    atorvastatin  10 mg Oral Daily    sodium chloride flush  5-40 mL IntraVENous 2 times per day     Continuous Infusions:    sodium chloride      sodium chloride       PRN Meds: HYDROmorphone **OR** HYDROmorphone, hydrOXYzine, oxyCODONE **OR** oxyCODONE,  Essential (primary) hypertension 1/13/2024 Yes    Hyperlipidemia, unspecified 1/13/2024 Yes    Displaced fracture of acromial process, right shoulder, initial encounter for closed fracture 1/13/2024 Yes    Rash and other nonspecific skin eruption 1/13/2024 Yes    Cervical myelopathy (HCC) 1/13/2024 Yes    Ankle wound, left, initial encounter 1/13/2024 Yes    Pre-operative clearance 1/14/2024 Yes    S/P cervical spinal fusion 1/19/2024 Yes       Plan:        Cervical stenosis s/p C3-C6 laminectomy and C3-T1 fusion on 1/16/2024  Neurology and neurosurgery following case  Continue PT/OT  Continue pain control     Primary hypertension  Blood pressure stable on Coreg     Dyslipidemia  Continue statin     right shoulder arthritis and nondisplaced acromion fracture.  Seen orthopedic surgery before transfer here from [facility], recommendation is for sling, nonweightbearing  Per Ortho note   ( I discussed with him at this point I think this should heal without any surgical intervention.  I am okay with him using the arm for activities of daily living that do not require elevation of the arm.  For example he can use it to help eat or dress himself.  I am okay with him using this on a walker for balance but I want him to put any weight through the arm.  I do think he has previous injury to the shoulder.  He currently has a nondisplaced acromion fracture.  The sling can be for comfort and okay with him coming out of it.  I will see him in the office for follow-up in 6 weeks. )     Chronic right ankle osteomyelitis: Hardware removal was completed on in November 2022, follows with Dr. Amado [facility].  Off antibiotics.  Infectious disease evaluated patient, no need for Abx, precautions were taken to prevent cross infection per ID recommendations      Angus Hunt MD  1/23/2024  12:19 PM

## 2024-01-23 NOTE — PROGRESS NOTES
Past Surgical History:   Procedure Laterality Date    CERVICAL FUSION N/A 1/16/2024    C3-C6 LAMINECTOMY, C3-T1 FUSION performed by Angella Perales DO at [facility] OR    LAMINECTOMY  01/16/2024    c3-c6       Medications:      mupirocin   Topical BID    lisinopril  10 mg Oral Daily    carvedilol  6.25 mg Oral BID WC    famotidine  20 mg Oral Daily    sodium chloride flush  5-40 mL IntraVENous 2 times per day    acetaminophen  650 mg Oral Q6H    polyethylene glycol  17 g Oral Daily    methocarbamol  500 mg Oral TID    enoxaparin  40 mg SubCUTAneous Daily    atorvastatin  10 mg Oral Daily    sodium chloride flush  5-40 mL IntraVENous 2 times per day    nicotine  1 patch TransDERmal Daily       Social History:     Social History     Socioeconomic History    Marital status: Single     Spouse name: Not on file    Number of children: Not on file    Years of education: Not on file    Highest education level: Not on file   Occupational History    Not on file   Tobacco Use    Smoking status: Former     Types: Cigarettes    Smokeless tobacco: Never   Substance and Sexual Activity    Alcohol use: Yes     Comment: beer a day    Drug use: Yes     Frequency: 7.0 times per week     Types: Marijuana (Weed)    Sexual activity: Not on file   Other Topics Concern    Not on file   Social History Narrative    Not on file     Social Determinants of Health     Financial Resource Strain: Not on file   Food Insecurity: No Food Insecurity (1/8/2024)    Hunger Vital Sign     Worried About Running Out of Food in the Last Year: Never true     Ran Out of Food in the Last Year: Never true   Transportation Needs: No Transportation Needs (1/8/2024)    PRAPARE - Transportation     Lack of Transportation (Medical): No     Lack of Transportation (Non-Medical): No   Physical Activity: Not on file   Stress: Not on file   Social Connections: Not on file   Intimate Partner Violence: Not on file   Housing Stability: Low Risk  (1/8/2024)    Housing Stability  Vital Sign     Unable to Pay for Housing in the Last Year: No     Number of Places Lived in the Last Year: 1     Unstable Housing in the Last Year: No       Family History:   History reviewed. No pertinent family history.   Medical Decision Making:   I have independently reviewed/ordered the following labs:    CBC with Differential: No results for input(s): \"WBC\", \"HGB\", \"HCT\", \"PLT\", \"SEGSPCT\", \"BANDSPCT\", \"LYMPHOPCT\", \"MONOPCT\", \"EOSPCT\" in the last 72 hours.    BMP:No results for input(s): \"NA\", \"K\", \"CL\", \"CO2\", \"BUN\", \"CREATININE\", \"CA\", \"MG\" in the last 72 hours.    Hepatic Function Panel: No results for input(s): \"PROT\", \"LABALBU\", \"BILIDIR\", \"IBILI\", \"BILITOT\", \"ALKPHOS\", \"ALT\", \"AST\" in the last 72 hours.    No results for input(s): \"RPR\" in the last 72 hours.  No results for input(s): \"HIV\" in the last 72 hours.  No results for input(s): \"BC\" in the last 72 hours.  Lab Results   Component Value Date/Time    CREATININE 0.4 01/20/2024 06:14 AM    GLUCOSE 100 01/20/2024 06:14 AM    GLUCOSE 100 06/12/2023 02:26 PM       Detailed results:        Thank you for allowing us to participate in the care of this patient.Please call with questions.    This note is created with the assistance of a speech recognition program.  While intending to generate adocument that actually reflects the content of the visit, the document can still have some errors including those of syntax and sound a like substitutions which may escape proof reading.  It such instances, actual meaningcan be extrapolated by contextual diversion.        Desmond Barreto MD  Family Medicine Resident, PGY-2   01/23/24              I have discussed the care of the patient, including pertinent history and exam findings,  with the resident., student or CNP- I have seen and examined the patient and the key elements of all parts of the encounter have been performed by me.  I agree with the assessment, plan and orders as documented by the resident.student or  DASIA Heard, Infectious Diseases

## 2024-01-23 NOTE — PROGRESS NOTES
Physical Therapy  Facility/Department: [address] ORTHO/MED SURG  Physical Therapy Treatment Note    Name: Moises Triplett  : 1955  MRN: [id number]  Date of Service: 2024    Discharge Recommendations:  Further therapy recommended at discharge.The patient should be able to tolerate at least 3 hours of therapy per day over 5 days or 15 hours over 7 days.   This patient may benefit from a Physical Medicine and Rehab consult.    PT Equipment Recommendations  Equipment Needed: Yes  Mobility Devices: Walker  Walker: Rolling      Patient Diagnosis(es): The primary encounter diagnosis was S/P cervical spinal fusion. Diagnoses of Cervical stenosis of spinal canal, Multiple falls, and Closed nondisplaced fracture of acromial process of right scapula, initial encounter were also pertinent to this visit.  Past Medical History:  has no past medical history on file.  Past Surgical History:  has a past surgical history that includes laminectomy (2024) and cervical fusion (N/A, 2024).    Assessment   Body Structures, Functions, Activity Limitations Requiring Skilled Therapeutic Intervention: Decreased functional mobility ;Decreased safe awareness;Decreased sensation;Decreased balance;Increased pain  Assessment: Pt required modA x2 for bed mobility and modA x2 for sit to stand transfer with use of RW (pt required verbal and tactile cues to only use RW for balance with R UE with fair return demo). Pt demonstrated improved seated and standing balance this date, able to amb 3ft with maxA x2.  Pt demonstrated good effort t/o therapy session.  Pt currently high risk of falls from both seated and standing positions, will require intensive PT to regain functional independence.  Therapy Prognosis: Good  Requires PT Follow-Up: Yes  Activity Tolerance  Activity Tolerance: Patient tolerated treatment well;Patient limited by endurance;Patient limited by pain  Activity Tolerance Comments: pt limited by fear of falling     Plan  steps, pt leaning posteriorly t/o mobility  requiring maxA x2 to prevent LOB  Gait Deviations: Decreased step length;Decreased step height;Shuffles  Distance: 3ft  Comments: very unsteady, impulsive, strong posterior lean t/o amb  More Ambulation?: No  Stairs/Curb  Stairs?: No     Balance  Posture: Fair  Sitting - Static: Fair;+  Sitting - Dynamic: Fair (pt remained seated EOB ~20 mins with close SBA, cues required for posture to prevent posterior lean)  Standing - Static: Poor;+  Standing - Dynamic: Poor  Comments: standing balance assessed with RW, seated balance assessed EOB and EOC  Exercise Treatment:   Seated LE exercise program: Long Arc Quads, hip abduction/adduction, heel/toe raises, and marches. Reps: 20x        AM-PAC - Mobility    AM-PAC Basic Mobility - Inpatient   How much help is needed turning from your back to your side while in a flat bed without using bedrails?: A Lot  How much help is needed moving from lying on your back to sitting on the side of a flat bed without using bedrails?: A Lot  How much help is needed moving to and from a bed to a chair?: Total  How much help is needed standing up from a chair using your arms?: Total  How much help is needed walking in hospital room?: Total  How much help is needed climbing 3-5 steps with a railing?: Total  AM-PAC Inpatient Mobility Raw Score : 8  AM-PAC Inpatient T-Scale Score : 28.52  Mobility Inpatient CMS 0-100% Score: 86.62  Mobility Inpatient CMS G-Code Modifier : CM    Goals  Short Term Goals  Time Frame for Short Term Goals: 10 visits  Short Term Goal 1: supine to sit with min assist  Short Term Goal 2: sit to stand transfer with min assist  Short Term Goal 3: bed to chair transfer with  min assist  Short Term Goal 4: 20 min strengthening exercise program x SBA  Short Term Goal 5: amb 125 ft with a RW x SBA     Education  Patient Education  Education Given To: Patient  Education Provided: Plan of Care;Role of Therapy;Home Exercise

## 2024-01-23 NOTE — CARE COORDINATION
Transitional planning: Call from [facility] that can accept patient and will start precert today. Previously patient had already been accepted to [facility] and was to transfer when medically stable. Patient had some issues last week after surgery w/ confusion which was thought to be related to several medications (Dilaudid, Xanax and Benedryl) that patient took the night after surgery, because could not sleep. Was unclear if was post surgical or medication related so patient was kept for evaluation. Patient back to baseline now, so was unclear why referrals to SNF facilities were made. Called [facility] and verified that patient can still come there or if auth . Spoke w/ patient and his plan was to go to [facility] and then [facility], if still not well enough to go home. Patient lives in a 2nd story apartment w/ no elevator and a lot of steps. Plan is to discharge to [facility] tomorrow.

## 2024-01-24 ENCOUNTER — HOSPITAL ENCOUNTER (OUTPATIENT)
Age: 69
Setting detail: SPECIMEN
Discharge: HOME | End: 2024-01-24

## 2024-01-24 LAB
ALBUMIN SERPL-MCNC: 3.7 G/DL (ref 3.5–5.2)
ALP SERPL-CCNC: 104 U/L (ref 40–129)
ALT SERPL-CCNC: 14 U/L (ref 5–41)
ANION GAP SERPL CALCULATED.3IONS-SCNC: 8 MMOL/L (ref 9–17)
AST SERPL-CCNC: 15 U/L
BILIRUB SERPL-MCNC: 0.2 MG/DL (ref 0.3–1.2)
BUN SERPL-MCNC: 14 MG/DL (ref 8–23)
BUN/CREAT SERPL: 28 (ref 9–20)
CALCIUM SERPL-MCNC: 9.1 MG/DL (ref 8.6–10.4)
CHLORIDE SERPL-SCNC: 99 MMOL/L (ref 98–107)
CO2 SERPL-SCNC: 28 MMOL/L (ref 20–31)
CREAT SERPL-MCNC: 0.5 MG/DL (ref 0.7–1.2)
ERYTHROCYTE [DISTWIDTH] IN BLOOD BY AUTOMATED COUNT: 18.6 % (ref 11.8–14.4)
GFR SERPL CREATININE-BSD FRML MDRD: >60 ML/MIN/1.73M2
GLUCOSE SERPL-MCNC: 82 MG/DL (ref 70–99)
HCT VFR BLD AUTO: 33.3 % (ref 40.7–50.3)
HGB BLD-MCNC: 10.4 G/DL (ref 13–17)
MCH RBC QN AUTO: 26.1 PG (ref 25.2–33.5)
MCHC RBC AUTO-ENTMCNC: 31.2 G/DL (ref 28.4–34.8)
MCV RBC AUTO: 83.7 FL (ref 82.6–102.9)
NRBC BLD-RTO: 0 PER 100 WBC
PLATELET # BLD AUTO: 563 K/UL (ref 138–453)
PMV BLD AUTO: 8.6 FL (ref 8.1–13.5)
POTASSIUM SERPL-SCNC: 4.5 MMOL/L (ref 3.7–5.3)
PROT SERPL-MCNC: 6.4 G/DL (ref 6.4–8.3)
RBC # BLD AUTO: 3.98 M/UL (ref 4.21–5.77)
SODIUM SERPL-SCNC: 135 MMOL/L (ref 135–144)
WBC OTHER # BLD: 5.8 K/UL (ref 3.5–11.3)

## 2024-01-24 PROCEDURE — P9603 ONE-WAY ALLOW PRORATED MILES: HCPCS

## 2024-01-24 PROCEDURE — 80053 COMPREHEN METABOLIC PANEL: CPT

## 2024-01-24 PROCEDURE — 85027 COMPLETE CBC AUTOMATED: CPT

## 2024-01-24 PROCEDURE — 36415 COLL VENOUS BLD VENIPUNCTURE: CPT

## 2024-01-24 NOTE — CARE COORDINATION
Transitional Planning: Called [facility] and requested 3 PM transport today. [facility]. Patient made aware of transport time.

## 2024-01-24 NOTE — DISCHARGE SUMMARY
Discharge Report    [facility]  Clinical Case Management Department  Written by: Sarah Castaneda    Patient Name: Moises YANES Uziel  Attending Provider: No att. providers found  Admit Date: 2024  5:47 PM  MRN: [id number]  Account: [id number]                     : 1955  Discharge Date: 2024      Disposition: [facility]    Sarah Castaneda, RN/CM

## 2024-01-25 PROBLEM — S42.124A CLOSED NONDISPLACED FRACTURE OF RIGHT ACROMIAL PROCESS: Status: ACTIVE | Noted: 2024-01-25

## 2024-01-25 NOTE — DISCHARGE SUMMARY
[facility]  Office: [phone]  Chapin Muse DO, Cyrus Dunn DO, Franky Jules DO, Danny Sexton DO, Luis Angel Gracia MD, Julee Quiñones MD, Delroy Landeros MD, Madison Collier MD,  Tony Rosales MD, Joni Dubois MD, Soila Oliva MD,  Neftali Larkin DO, Alvaro Maria MD, Juan Luis Faustin MD, Torin Muse DO, Eden Barajas MD,  Marquis Lemus DO, Yarely Molina MD, Maya West MD, Karen Adler MD, Shyam Beckman MD,  Rojas Neri MD, Axel Tello MD, Ryan Batista MD, Tima Hoffmann MD, Mihcael Grimes MD, Angus Hunt MD, Ramiro Burns DO, Bhupinder Mao DO, Mari Villasenor MD,  Davion Graves MD, Shirley Waterhouse, CNP,  Alycia De La Fuente, CNP, Tavon Watkins, CNP,  Katharine Merrill, DNP, Laura Small, CNP, Brenda Jade, CNP, Malena Rossi CNP, Marya Watson, CNP, Kortney Wilson, CNP, Zabrina Obrien, PA-C, Soila Barber, PA-C, Tia Mohan, CNP, Betina Saldivar, CNS, Carmen Worley, CNP, Mamie Crain, CNP, Tracy Schwab, CNP         [facility]   IN-PATIENT SERVICE   [facility]    Discharge Summary     Patient ID: Moises Triplett  :  1955   MRN: [id number]     ACCOUNT:  [id number]   Patient's PCP: Amparo Bradford APRN - NP  Admit Date: 2024   Discharge Date: 2024     Length of Stay: 11  Code Status:  Prior  Admitting Physician: Madison Collier MD  Discharge Physician: Angus Hunt MD     Active Discharge Diagnoses:     Hospital Problem Lists:  Principal Problem:    Cervical stenosis of spinal canal  Active Problems:    Essential (primary) hypertension    Hyperlipidemia, unspecified    Displaced fracture of acromial process, right shoulder, initial encounter for closed fracture    Rash and other nonspecific skin eruption    Cervical myelopathy (HCC)    Ankle wound, left, initial encounter    Pre-operative clearance    S/P cervical spinal fusion    MRSA infection, non-invasive  Resolved Problems:    * No resolved hospital

## 2024-01-26 ENCOUNTER — HOSPITAL ENCOUNTER (OUTPATIENT)
Age: 69
Setting detail: SPECIMEN
Discharge: HOME | End: 2024-01-26

## 2024-01-26 LAB
ANION GAP SERPL CALCULATED.3IONS-SCNC: 15 MMOL/L (ref 9–17)
BUN SERPL-MCNC: 15 MG/DL (ref 8–23)
BUN/CREAT SERPL: 30 (ref 9–20)
CALCIUM SERPL-MCNC: 9.4 MG/DL (ref 8.6–10.4)
CHLORIDE SERPL-SCNC: 96 MMOL/L (ref 98–107)
CO2 SERPL-SCNC: 24 MMOL/L (ref 20–31)
CREAT SERPL-MCNC: 0.5 MG/DL (ref 0.7–1.2)
ERYTHROCYTE [DISTWIDTH] IN BLOOD BY AUTOMATED COUNT: 18.2 % (ref 11.8–14.4)
GFR SERPL CREATININE-BSD FRML MDRD: >60 ML/MIN/1.73M2
GLUCOSE SERPL-MCNC: 99 MG/DL (ref 70–99)
HCT VFR BLD AUTO: 35.5 % (ref 40.7–50.3)
HGB BLD-MCNC: 11.1 G/DL (ref 13–17)
MCH RBC QN AUTO: 26.3 PG (ref 25.2–33.5)
MCHC RBC AUTO-ENTMCNC: 31.3 G/DL (ref 28.4–34.8)
MCV RBC AUTO: 84.1 FL (ref 82.6–102.9)
NRBC BLD-RTO: 0 PER 100 WBC
PLATELET # BLD AUTO: 616 K/UL (ref 138–453)
PMV BLD AUTO: 8.9 FL (ref 8.1–13.5)
POTASSIUM SERPL-SCNC: 4.1 MMOL/L (ref 3.7–5.3)
RBC # BLD AUTO: 4.22 M/UL (ref 4.21–5.77)
SODIUM SERPL-SCNC: 135 MMOL/L (ref 135–144)
WBC OTHER # BLD: 7.2 K/UL (ref 3.5–11.3)

## 2024-01-26 PROCEDURE — P9603 ONE-WAY ALLOW PRORATED MILES: HCPCS

## 2024-01-26 PROCEDURE — 80048 BASIC METABOLIC PNL TOTAL CA: CPT

## 2024-01-26 PROCEDURE — 85027 COMPLETE CBC AUTOMATED: CPT

## 2024-01-26 PROCEDURE — 36415 COLL VENOUS BLD VENIPUNCTURE: CPT

## 2024-01-30 ENCOUNTER — HOSPITAL ENCOUNTER (OUTPATIENT)
Age: 69
Setting detail: SPECIMEN
Discharge: HOME | End: 2024-01-30

## 2024-01-30 LAB
ANION GAP SERPL CALCULATED.3IONS-SCNC: 10 MMOL/L (ref 9–17)
BUN SERPL-MCNC: 12 MG/DL (ref 8–23)
BUN/CREAT SERPL: 24 (ref 9–20)
CALCIUM SERPL-MCNC: 9 MG/DL (ref 8.6–10.4)
CHLORIDE SERPL-SCNC: 100 MMOL/L (ref 98–107)
CO2 SERPL-SCNC: 27 MMOL/L (ref 20–31)
CREAT SERPL-MCNC: 0.5 MG/DL (ref 0.7–1.2)
ERYTHROCYTE [DISTWIDTH] IN BLOOD BY AUTOMATED COUNT: 18 % (ref 11.8–14.4)
GFR SERPL CREATININE-BSD FRML MDRD: >60 ML/MIN/1.73M2
GLUCOSE SERPL-MCNC: 77 MG/DL (ref 70–99)
HCT VFR BLD AUTO: 33.4 % (ref 40.7–50.3)
HGB BLD-MCNC: 10.5 G/DL (ref 13–17)
MCH RBC QN AUTO: 26.3 PG (ref 25.2–33.5)
MCHC RBC AUTO-ENTMCNC: 31.4 G/DL (ref 28.4–34.8)
MCV RBC AUTO: 83.5 FL (ref 82.6–102.9)
NRBC BLD-RTO: 0 PER 100 WBC
PLATELET # BLD AUTO: 569 K/UL (ref 138–453)
PMV BLD AUTO: 8.6 FL (ref 8.1–13.5)
POTASSIUM SERPL-SCNC: 4.2 MMOL/L (ref 3.7–5.3)
RBC # BLD AUTO: 4 M/UL (ref 4.21–5.77)
SODIUM SERPL-SCNC: 137 MMOL/L (ref 135–144)
WBC OTHER # BLD: 5 K/UL (ref 3.5–11.3)

## 2024-01-30 PROCEDURE — P9603 ONE-WAY ALLOW PRORATED MILES: HCPCS

## 2024-01-30 PROCEDURE — 36415 COLL VENOUS BLD VENIPUNCTURE: CPT

## 2024-01-30 PROCEDURE — 80048 BASIC METABOLIC PNL TOTAL CA: CPT

## 2024-01-30 PROCEDURE — 85027 COMPLETE CBC AUTOMATED: CPT

## 2024-02-02 ENCOUNTER — OFFICE VISIT (OUTPATIENT)
Dept: NEUROSURGERY | Age: 69
End: 2024-02-02

## 2024-02-02 ENCOUNTER — HOSPITAL ENCOUNTER (OUTPATIENT)
Age: 69
Setting detail: SPECIMEN
Discharge: HOME | End: 2024-02-02

## 2024-02-02 VITALS
HEART RATE: 71 BPM | DIASTOLIC BLOOD PRESSURE: 76 MMHG | OXYGEN SATURATION: 95 % | TEMPERATURE: 98.5 F | SYSTOLIC BLOOD PRESSURE: 112 MMHG | HEIGHT: 67 IN | BODY MASS INDEX: 19.62 KG/M2 | WEIGHT: 125 LBS

## 2024-02-02 DIAGNOSIS — G95.9 CERVICAL MYELOPATHY (HCC): Primary | ICD-10-CM

## 2024-02-02 DIAGNOSIS — M48.02 CERVICAL STENOSIS OF SPINAL CANAL: ICD-10-CM

## 2024-02-02 DIAGNOSIS — Z98.1 S/P CERVICAL SPINAL FUSION: ICD-10-CM

## 2024-02-02 LAB
ANION GAP SERPL CALCULATED.3IONS-SCNC: 11 MMOL/L (ref 9–17)
BUN SERPL-MCNC: 16 MG/DL (ref 8–23)
BUN/CREAT SERPL: 32 (ref 9–20)
CALCIUM SERPL-MCNC: 9.2 MG/DL (ref 8.6–10.4)
CHLORIDE SERPL-SCNC: 100 MMOL/L (ref 98–107)
CO2 SERPL-SCNC: 27 MMOL/L (ref 20–31)
CREAT SERPL-MCNC: 0.5 MG/DL (ref 0.7–1.2)
ERYTHROCYTE [DISTWIDTH] IN BLOOD BY AUTOMATED COUNT: 17.8 % (ref 11.8–14.4)
GFR SERPL CREATININE-BSD FRML MDRD: >60 ML/MIN/1.73M2
GLUCOSE SERPL-MCNC: 68 MG/DL (ref 70–99)
HCT VFR BLD AUTO: 34.9 % (ref 40.7–50.3)
HGB BLD-MCNC: 10.9 G/DL (ref 13–17)
MCH RBC QN AUTO: 26.5 PG (ref 25.2–33.5)
MCHC RBC AUTO-ENTMCNC: 31.2 G/DL (ref 28.4–34.8)
MCV RBC AUTO: 84.9 FL (ref 82.6–102.9)
NRBC BLD-RTO: 0 PER 100 WBC
PLATELET # BLD AUTO: 576 K/UL (ref 138–453)
PMV BLD AUTO: 8.2 FL (ref 8.1–13.5)
POTASSIUM SERPL-SCNC: 4.4 MMOL/L (ref 3.7–5.3)
RBC # BLD AUTO: 4.11 M/UL (ref 4.21–5.77)
SODIUM SERPL-SCNC: 138 MMOL/L (ref 135–144)
WBC OTHER # BLD: 6.5 K/UL (ref 3.5–11.3)

## 2024-02-02 PROCEDURE — 80048 BASIC METABOLIC PNL TOTAL CA: CPT

## 2024-02-02 PROCEDURE — 99024 POSTOP FOLLOW-UP VISIT: CPT | Performed by: NURSE PRACTITIONER

## 2024-02-02 PROCEDURE — 85027 COMPLETE CBC AUTOMATED: CPT

## 2024-02-02 PROCEDURE — 36415 COLL VENOUS BLD VENIPUNCTURE: CPT

## 2024-02-02 PROCEDURE — P9603 ONE-WAY ALLOW PRORATED MILES: HCPCS

## 2024-02-02 NOTE — PROGRESS NOTES
[facility] NEUROSURGERY [place] [id number] [facility]  [id number]  [address] - GROUND FLOOR, [address]  [facility] [address]  Dept: [phone]    Patient:  Moises Triplett  YOB: 1955  Date: 2/2/24    The patient is a 68 y.o. male who presents today for consult of the following problems:     Chief Complaint   Patient presents with    Other     Post Op. Medication request, Patient has been having hot flashes and claustrophobic.         HPI:     Moises Triplett is a 68 y.o. male who presents to the office for post-op evaluation s/p posterior cervical decompression fusion C3-T1.  Patient doing well, remains in rehab facility.  Postop pain is very well-controlled.  Still with fair amount of proximal upper extremity weakness, difficulty with gait stability.  Has been working with physical and occupational therapy at facility.  Has been compliant with cervical collar.  Incision is healing well, no fevers, chills, drainage.    Bilateral deltoids 2-3/5  Bicep/tricep 3-4/5  Bilateral hand grasps 4/5-significant atrophy  Lower extremities 4/5  Incision CDI, healing well  Sensation grossly intact    Date of surgery: 1/16/2024    Assessment and Plan:      1. Cervical myelopathy (HCC)    2. Cervical stenosis of spinal canal    3. S/P cervical spinal fusion          Plan: Patient doing well, reviewed cervical collar instructions, keep in place while upright, okay to remove to eat, sleep and to shower.  Patient has heard from bone stimulator rep, will receive unit once he is discharged from rehab.  Incision healing well.  Continue working with therapy.  Next postop visit in 6 weeks, upright x-rays prior.    Followup: Return in about 6 weeks (around 3/15/2024), or if symptoms worsen or fail to improve.    Prescriptions Ordered:  No orders of the defined types were placed in this encounter.       Orders Placed:  Orders Placed This Encounter   Procedures    XR CERVICAL SPINE

## 2024-02-06 ENCOUNTER — HOSPITAL ENCOUNTER (OUTPATIENT)
Age: 69
Setting detail: SPECIMEN
Discharge: HOME | End: 2024-02-06

## 2024-02-06 LAB
ANION GAP SERPL CALCULATED.3IONS-SCNC: 11 MMOL/L (ref 9–17)
BUN SERPL-MCNC: 16 MG/DL (ref 8–23)
BUN/CREAT SERPL: 32 (ref 9–20)
CALCIUM SERPL-MCNC: 9.3 MG/DL (ref 8.6–10.4)
CHLORIDE SERPL-SCNC: 100 MMOL/L (ref 98–107)
CO2 SERPL-SCNC: 26 MMOL/L (ref 20–31)
CREAT SERPL-MCNC: 0.5 MG/DL (ref 0.7–1.2)
ERYTHROCYTE [DISTWIDTH] IN BLOOD BY AUTOMATED COUNT: 17.4 % (ref 11.8–14.4)
GFR SERPL CREATININE-BSD FRML MDRD: >60 ML/MIN/1.73M2
GLUCOSE SERPL-MCNC: 78 MG/DL (ref 70–99)
HCT VFR BLD AUTO: 39 % (ref 40.7–50.3)
HGB BLD-MCNC: 12.2 G/DL (ref 13–17)
MCH RBC QN AUTO: 26.2 PG (ref 25.2–33.5)
MCHC RBC AUTO-ENTMCNC: 31.3 G/DL (ref 28.4–34.8)
MCV RBC AUTO: 83.9 FL (ref 82.6–102.9)
NRBC BLD-RTO: 0 PER 100 WBC
PLATELET # BLD AUTO: 638 K/UL (ref 138–453)
PMV BLD AUTO: 8.6 FL (ref 8.1–13.5)
POTASSIUM SERPL-SCNC: 5.1 MMOL/L (ref 3.7–5.3)
RBC # BLD AUTO: 4.65 M/UL (ref 4.21–5.77)
SODIUM SERPL-SCNC: 137 MMOL/L (ref 135–144)
WBC OTHER # BLD: 7.5 K/UL (ref 3.5–11.3)

## 2024-02-06 PROCEDURE — P9603 ONE-WAY ALLOW PRORATED MILES: HCPCS

## 2024-02-06 PROCEDURE — 85027 COMPLETE CBC AUTOMATED: CPT

## 2024-02-06 PROCEDURE — 80048 BASIC METABOLIC PNL TOTAL CA: CPT

## 2024-02-06 PROCEDURE — 36415 COLL VENOUS BLD VENIPUNCTURE: CPT

## 2024-02-09 ENCOUNTER — HOSPITAL ENCOUNTER (OUTPATIENT)
Age: 69
Setting detail: SPECIMEN
Discharge: HOME | End: 2024-02-09

## 2024-02-09 LAB
ANION GAP SERPL CALCULATED.3IONS-SCNC: 8 MMOL/L (ref 9–17)
BUN SERPL-MCNC: 15 MG/DL (ref 8–23)
BUN/CREAT SERPL: 30 (ref 9–20)
CALCIUM SERPL-MCNC: 9.3 MG/DL (ref 8.6–10.4)
CHLORIDE SERPL-SCNC: 100 MMOL/L (ref 98–107)
CO2 SERPL-SCNC: 28 MMOL/L (ref 20–31)
CREAT SERPL-MCNC: 0.5 MG/DL (ref 0.7–1.2)
ERYTHROCYTE [DISTWIDTH] IN BLOOD BY AUTOMATED COUNT: 17.1 % (ref 11.8–14.4)
GFR SERPL CREATININE-BSD FRML MDRD: >60 ML/MIN/1.73M2
GLUCOSE SERPL-MCNC: 78 MG/DL (ref 70–99)
HCT VFR BLD AUTO: 34 % (ref 40.7–50.3)
HGB BLD-MCNC: 10.6 G/DL (ref 13–17)
MCH RBC QN AUTO: 26.3 PG (ref 25.2–33.5)
MCHC RBC AUTO-ENTMCNC: 31.2 G/DL (ref 28.4–34.8)
MCV RBC AUTO: 84.4 FL (ref 82.6–102.9)
NRBC BLD-RTO: 0 PER 100 WBC
PLATELET # BLD AUTO: 512 K/UL (ref 138–453)
PMV BLD AUTO: 8.9 FL (ref 8.1–13.5)
POTASSIUM SERPL-SCNC: 4.1 MMOL/L (ref 3.7–5.3)
RBC # BLD AUTO: 4.03 M/UL (ref 4.21–5.77)
SODIUM SERPL-SCNC: 136 MMOL/L (ref 135–144)
WBC OTHER # BLD: 5.4 K/UL (ref 3.5–11.3)

## 2024-02-09 PROCEDURE — 85027 COMPLETE CBC AUTOMATED: CPT

## 2024-02-09 PROCEDURE — 80048 BASIC METABOLIC PNL TOTAL CA: CPT

## 2024-02-09 PROCEDURE — 36415 COLL VENOUS BLD VENIPUNCTURE: CPT

## 2024-02-09 PROCEDURE — P9603 ONE-WAY ALLOW PRORATED MILES: HCPCS

## 2024-02-12 DIAGNOSIS — M25.511 RIGHT SHOULDER PAIN, UNSPECIFIED CHRONICITY: Primary | ICD-10-CM

## 2024-02-13 PROBLEM — Z01.818 PRE-OPERATIVE CLEARANCE: Status: RESOLVED | Noted: 2024-01-13 | Resolved: 2024-02-13

## 2024-02-19 ENCOUNTER — TELEPHONE (OUTPATIENT)
Dept: ORTHOPEDIC SURGERY | Age: 69
End: 2024-02-19

## 2024-02-19 NOTE — TELEPHONE ENCOUNTER
Patient called to reschedule his new patient/ Hospital follow up appointment with Dr Crespo from 2/15/24.  Patient was discharged from [facility] on  1/23/24  for closed nondisplaced fracture of acromial process of right scapula.   His appointment on 2/15 was cancelled due to transportation issues with the rehab facility he is currently staying at.  Patient is requesting to follow up with Dr Crespo only and is requesting to be seen as soon as possible since the fracture is close to a month old.   He states he knows Dr Crespo personally and wants to be seen by him only and prefers [place] location as that is the closest to his rehab facility.  Patient has had many issues and unfortunately those issues took  priority over his shoulder.    He is asking for a return call to discuss.    Thank you.

## 2024-02-20 NOTE — TELEPHONE ENCOUNTER
Patient's wife Ana, gave us a call back. She stated that she has not been able to reach him via his personal cell number ending in [phone]. She stated that he is staying at the [facility] for rehab services there and gave us the number. [phone] in order to contact them and get the patient here to his appointment for tomorrow at 3:30pm.

## 2024-02-20 NOTE — TELEPHONE ENCOUNTER
Called and spoke to ANASTASIA Hanley(Director of Nursing) at [facility]. Gave her the address and appointment time for the patient. They will get transportation set up for the patient to attend his appointment on tomorrow 2/21/24 at 3:30 with Dr. Crespo.

## 2024-02-20 NOTE — TELEPHONE ENCOUNTER
Per Dr. Crespo Placed the patient on his schedule to be seen this coming Wednesday 2/21/24 at 3:30 pm with pre-clinic X-rays of his right shoulder.    Called to speak to the patient in regards to this as the previous message stated that the patient was adamant about coming in as soon as possible.    Unable to LVM for the patient as the patient does not have a VM box set up.    Did call next number available, his mobile phone, spoke to his wife who stated that the patient is still in a rehab facility at this current time and she will call and speak to him, let him know of the appointment time, to see if he will be able to make it and then give us a call back in regards to that to see if he will make it or not.

## 2024-02-21 ENCOUNTER — OFFICE VISIT (OUTPATIENT)
Dept: ORTHOPEDIC SURGERY | Age: 69
End: 2024-02-21
Payer: MEDICARE

## 2024-02-21 VITALS — HEIGHT: 67 IN | WEIGHT: 125 LBS | RESPIRATION RATE: 15 BRPM | OXYGEN SATURATION: 100 % | BODY MASS INDEX: 19.62 KG/M2

## 2024-02-21 DIAGNOSIS — S42.124A CLOSED NONDISPLACED FRACTURE OF ACROMIAL PROCESS OF RIGHT SCAPULA, INITIAL ENCOUNTER: Primary | ICD-10-CM

## 2024-02-21 PROCEDURE — 1123F ACP DISCUSS/DSCN MKR DOCD: CPT | Performed by: ORTHOPAEDIC SURGERY

## 2024-02-21 PROCEDURE — 99203 OFFICE O/P NEW LOW 30 MIN: CPT | Performed by: ORTHOPAEDIC SURGERY

## 2024-02-21 RX ORDER — OXYCODONE HYDROCHLORIDE AND ACETAMINOPHEN 5; 325 MG/1; MG/1
2 TABLET ORAL EVERY 4 HOURS PRN
COMMUNITY

## 2024-02-21 NOTE — PROGRESS NOTES
[facility] ORTHOPEDICS AND SPORTS MEDICINE  [address]  [place] [address]  Dept: [phone]  Dept Fax: [phone]          New Patient   Follow Up    Subjective:   Moises Triplett is a 68 year old RHD male who presents to our office today for evaluation after falling from standing height on 1/8/24. He lives at home alone, and was found by his home health aid. Endorses history of repeated falls and progressive weakness over the past weeks and months preceding this most recent fall that resulted in his hospitalization. Denies hitting his head or any LOC. He was found to have a right scapular fracture. Dr. Henry Hankins evaluated him on 1/9/24 and said to follow up with him 6 weeks later. He was ultimately sent to our clinic for follow up.     Endorses prior baseline pain in his right shoulder. MRI performed of his neck at that same time revealed significant central canal stenosis C4-C6 that was ultimately treated with Dr. Perales at [place] with C3-6 decompression and posterior instrumentation. He has been in a rehab facility since then and presents today for re-evaluation of his right shoulder pain. Denies any new numbness, burning, tingling sensations. Denies current tobacco smoking, but does use marijuana. Has no history of diabetes. Denies any chemical anticoagulation.     Patient's friend present during entire evaluation. Of note his left ankle has been managed recently for ORIF of ankle fracture-dislocation event, reportedly (chart review revealed no specific details or injury or treatments). His hardware became infected and required removal in combination with multiple debridement surgeries and wound care.     Review of Systems   Constitutional: Negative for Fever and Chills.   HENT: Negative for Congestion.    Eyes: Negative for Blurred Vision and Double Vision.   Respiratory: Negative for Cough, Shortness of Breath and

## 2024-02-29 ENCOUNTER — HOSPITAL ENCOUNTER (OUTPATIENT)
Dept: CT IMAGING | Age: 69
Discharge: HOME | End: 2024-03-02
Payer: MEDICARE

## 2024-02-29 DIAGNOSIS — S42.124A CLOSED NONDISPLACED FRACTURE OF ACROMIAL PROCESS OF RIGHT SCAPULA, INITIAL ENCOUNTER: ICD-10-CM

## 2024-02-29 PROCEDURE — 73200 CT UPPER EXTREMITY W/O DYE: CPT

## 2024-03-06 ENCOUNTER — OFFICE VISIT (OUTPATIENT)
Dept: ORTHOPEDIC SURGERY | Age: 69
End: 2024-03-06
Payer: MEDICARE

## 2024-03-06 VITALS — BODY MASS INDEX: 19.62 KG/M2 | WEIGHT: 125 LBS | HEIGHT: 67 IN

## 2024-03-06 DIAGNOSIS — M25.511 RIGHT SHOULDER PAIN, UNSPECIFIED CHRONICITY: Primary | ICD-10-CM

## 2024-03-06 PROCEDURE — 1123F ACP DISCUSS/DSCN MKR DOCD: CPT

## 2024-03-06 PROCEDURE — 99214 OFFICE O/P EST MOD 30 MIN: CPT

## 2024-03-06 RX ORDER — IBUPROFEN 600 MG/1
600 TABLET ORAL 3 TIMES DAILY PRN
Qty: 60 TABLET | Refills: 2 | Status: SHIPPED | OUTPATIENT
Start: 2024-03-06

## 2024-03-06 ASSESSMENT — ENCOUNTER SYMPTOMS: COLOR CHANGE: 0

## 2024-03-06 NOTE — PROGRESS NOTES
Soft Tissue: Large joint effusion.  This is complex.  Multiple loose bodies  within the joint space.  The rotator cuff as can be seen appears to be intact  but the oral head is mildly high riding.  This may represent rotator cuff  insufficiency.  No definite fluid in the bursa.  No large axillary adenopathy.     The visualized right lung has respiratory motion.  No consolidation.     Joint: Advanced osteoarthritis     IMPRESSION:  1. Old non healed fractures of the osseous glenoid.  2. Extensive subchondral cysts and deformity of the proximal humerus.  No  acute fracture.  3. Synovial osteochondromatosis.      Assessment:      1. Right shoulder pain, unspecified chronicity       Plan:      The patient presents today for follow-up of his right shoulder pain.  He was last seen in the office on 2/21/2024.  Previous x-rays of the right shoulder demonstrate severe degenerative changes throughout the shoulder.  A CT without contrast of the right shoulder was ordered at the last visit and this was done on 2/29/2024.  The CT is significant for old nonhealed fractures of the osseous glenoid, extensive subchondral cyst and deformity of the proximal humerus, no acute fractures, and synovial osteochondromatosis.  The results of the CT were discussed with the patient today.  I also discussed this case with Dr. Lobo Crespo DO prior to speaking with the patient.  At this time we recommend that the patient proceed with passive and active range of motion of the shoulder with progression to strengthening.  He will do this with the physical therapy staff at the skilled nursing [facility].  The patient is also requesting ibuprofen as this has provided him with good pain relief in the past.  I will provide the patient with a prescription for 600 mg of ibuprofen and he may take this up to 3 times a day as needed for pain.  I also discussed with the patient the importance of returning to rheumatology for further management of his

## 2024-03-26 ENCOUNTER — HOSPITAL ENCOUNTER (OUTPATIENT)
Dept: GENERAL RADIOLOGY | Age: 69
Discharge: HOME | End: 2024-03-28
Payer: MEDICARE

## 2024-03-26 ENCOUNTER — OFFICE VISIT (OUTPATIENT)
Dept: NEUROSURGERY | Age: 69
End: 2024-03-26
Payer: MEDICARE

## 2024-03-26 ENCOUNTER — HOSPITAL ENCOUNTER (OUTPATIENT)
Age: 69
Discharge: HOME | End: 2024-03-28
Payer: MEDICARE

## 2024-03-26 VITALS
SYSTOLIC BLOOD PRESSURE: 126 MMHG | HEIGHT: 67 IN | HEART RATE: 64 BPM | DIASTOLIC BLOOD PRESSURE: 78 MMHG | WEIGHT: 135 LBS | BODY MASS INDEX: 21.19 KG/M2

## 2024-03-26 DIAGNOSIS — M48.02 CERVICAL STENOSIS OF SPINAL CANAL: ICD-10-CM

## 2024-03-26 DIAGNOSIS — T14.8XXA CHRONIC WOUND: ICD-10-CM

## 2024-03-26 DIAGNOSIS — Z98.1 S/P CERVICAL SPINAL FUSION: ICD-10-CM

## 2024-03-26 DIAGNOSIS — G95.9 CERVICAL MYELOPATHY (HCC): Primary | ICD-10-CM

## 2024-03-26 DIAGNOSIS — G95.9 CERVICAL MYELOPATHY (HCC): ICD-10-CM

## 2024-03-26 PROCEDURE — 3078F DIAST BP <80 MM HG: CPT | Performed by: NEUROLOGICAL SURGERY

## 2024-03-26 PROCEDURE — 72050 X-RAY EXAM NECK SPINE 4/5VWS: CPT

## 2024-03-26 PROCEDURE — 3074F SYST BP LT 130 MM HG: CPT | Performed by: NEUROLOGICAL SURGERY

## 2024-03-26 PROCEDURE — 99213 OFFICE O/P EST LOW 20 MIN: CPT | Performed by: NEUROLOGICAL SURGERY

## 2024-03-26 PROCEDURE — 1123F ACP DISCUSS/DSCN MKR DOCD: CPT | Performed by: NEUROLOGICAL SURGERY

## 2024-03-26 RX ORDER — CLOPIDOGREL BISULFATE 75 MG/1
75 TABLET ORAL
COMMUNITY
Start: 2022-09-30

## 2024-03-26 NOTE — PROGRESS NOTES
Department of Neurosurgery                                                      Follow up visit      History Obtained From:  patient, spouse    CHIEF COMPLAINT:         Chief Complaint   Patient presents with    Post-Op Check       HISTORY OF PRESENT ILLNESS:       The patient is a 68 y.o. male who presents for follow up for cervical myelopathy, cervical stenosis of spinal canal, and S/P cervical spinal fusion (01/16/2024). Patient presents to the office in a wheelchair.    Patient reports that initially in rehab he had been unable to do much work as he had a fracture in his right clavicle. He is currently doing more in physical therapy. He was wearing his cervical collar and states that he wore it consistently except to sleep. Patient states that he is able to walk using a walker and has been working on his balance while using the walker. Since surgery he feels that he is slowly getting stronger and regaining his balance. Patient reports that he still has some numbness, tingling, and weakness in his hands however feels that this is improving. Overall the patient expresses that he is pleased with the results of the surgery. He has been taking Ibuprofen to control his pain and currently says that it is under good control. He s currently staying in [facility].    Patient does make note of mildly decreased range of motion of his shoulders since surgery. The patient believes his decreased shoulder range of motion to be due to arthritis and being more sedentary since surgery. Patient makes note of a painful wound over his left ankle which was seen by wound care yesterday and was present prior to surgery. He believes it has been having trouble healing and has noted that it reopened when he hit his foot on his wheelchair.     PAST MEDICAL HISTORY :       Past Medical History:    No past medical history on file.    Past Surgical History:        Procedure Laterality Date    CERVICAL FUSION N/A

## 2024-07-23 ENCOUNTER — OFFICE VISIT (OUTPATIENT)
Dept: NEUROSURGERY | Age: 69
End: 2024-07-23
Payer: MEDICARE

## 2024-07-23 VITALS
HEIGHT: 67 IN | HEART RATE: 71 BPM | BODY MASS INDEX: 21.94 KG/M2 | SYSTOLIC BLOOD PRESSURE: 119 MMHG | DIASTOLIC BLOOD PRESSURE: 78 MMHG | WEIGHT: 139.8 LBS

## 2024-07-23 DIAGNOSIS — G95.9 CERVICAL MYELOPATHY (HCC): Primary | ICD-10-CM

## 2024-07-23 DIAGNOSIS — Z98.1 S/P CERVICAL SPINAL FUSION: ICD-10-CM

## 2024-07-23 PROCEDURE — 3078F DIAST BP <80 MM HG: CPT | Performed by: NEUROLOGICAL SURGERY

## 2024-07-23 PROCEDURE — 3074F SYST BP LT 130 MM HG: CPT | Performed by: NEUROLOGICAL SURGERY

## 2024-07-23 PROCEDURE — 1123F ACP DISCUSS/DSCN MKR DOCD: CPT | Performed by: NEUROLOGICAL SURGERY

## 2024-07-23 PROCEDURE — 99214 OFFICE O/P EST MOD 30 MIN: CPT | Performed by: NEUROLOGICAL SURGERY

## 2024-07-23 NOTE — PROGRESS NOTES
Department of Neurosurgery                                                      Follow up visit      History Obtained From:  patient    CHIEF COMPLAINT:         Chief Complaint   Patient presents with    Follow-up       HISTORY OF PRESENT ILLNESS:       The patient is a 69 y.o. male who presents for follow up for cervical myelopathy 6 months after C3-6 laminectomy and C3-T1 fusion.    He states that he is still experiencing post-operative numbness in the fingers and weakness in his hands. His overall weakness has improved about 33% since surgery, and continues to improve slowly. He no longer drops objects or loses  strength at home. He notes that the tingling sensations are now gone, but that he experiences occasional symmetric numbness in his hands.     He admits to a past history of shoulder arthritis. The patient denies having established care with a shoulder specialist, but has previously followed with an orthopedic specialist, Dr. Lobo Crespo, and a [facility] Rheumatologist who did not treat his shoulders. He advises that his shoulder pain can reach 8/10 in severity but does not require treatment as frequently as his hand pain which he takes medication for during severe episodes.    He has reported no recent falls and is now living alone without balance related issues.     He has been following with wound care at [facility] for his left ankle wound. He presents to the office today walking with a cane for ambulatory assistance.    PAST MEDICAL HISTORY :       Past Medical History:    History reviewed. No pertinent past medical history.    Past Surgical History:        Procedure Laterality Date    CERVICAL FUSION N/A 1/16/2024    C3-C6 LAMINECTOMY, C3-T1 FUSION performed by Angella Perales DO at [facility] OR    LAMINECTOMY  01/16/2024    c3-c6       Social History:   Social History     Socioeconomic History    Marital status: Single     Spouse name: Not on file    Number of children: Not on file    Years of

## 2025-01-30 ENCOUNTER — OFFICE VISIT (OUTPATIENT)
Dept: NEUROSURGERY | Age: 70
End: 2025-01-30
Payer: MEDICARE

## 2025-01-30 VITALS
SYSTOLIC BLOOD PRESSURE: 143 MMHG | WEIGHT: 140.9 LBS | DIASTOLIC BLOOD PRESSURE: 82 MMHG | BODY MASS INDEX: 22.11 KG/M2 | HEART RATE: 73 BPM | HEIGHT: 67 IN

## 2025-01-30 DIAGNOSIS — T14.8XXA CHRONIC WOUND: ICD-10-CM

## 2025-01-30 DIAGNOSIS — Z98.1 S/P CERVICAL SPINAL FUSION: Primary | ICD-10-CM

## 2025-01-30 DIAGNOSIS — G95.9 CERVICAL MYELOPATHY (HCC): ICD-10-CM

## 2025-01-30 PROCEDURE — 99213 OFFICE O/P EST LOW 20 MIN: CPT | Performed by: NEUROLOGICAL SURGERY

## 2025-01-30 PROCEDURE — 1123F ACP DISCUSS/DSCN MKR DOCD: CPT | Performed by: NEUROLOGICAL SURGERY

## 2025-01-30 PROCEDURE — 3077F SYST BP >= 140 MM HG: CPT | Performed by: NEUROLOGICAL SURGERY

## 2025-01-30 PROCEDURE — 1159F MED LIST DOCD IN RCRD: CPT | Performed by: NEUROLOGICAL SURGERY

## 2025-01-30 PROCEDURE — 3079F DIAST BP 80-89 MM HG: CPT | Performed by: NEUROLOGICAL SURGERY

## 2025-01-30 NOTE — PROGRESS NOTES
Department of Neurosurgery                                                      Follow up visit      History Obtained From:  patient, friend    CHIEF COMPLAINT:         Chief Complaint   Patient presents with    Follow-up    Other     Neck and shoulder pain at         HISTORY OF PRESENT ILLNESS:       The patient is a 69 y.o. male who presents for follow up for cervical myelopathy and is s/p cervical spine fusion (01/16/2024).    On 07/23/2024, He states that he is still experiencing post-operative numbness in the fingers and weakness in his hands. His overall weakness has improved about 33% since surgery, and continues to improve slowly. He no longer drops objects or loses  strength at home. He notes that the tingling sensations are now gone, but that he experiences occasional symmetric numbness in his hands.   He admits to a past history of shoulder arthritis. The patient denies having established care with a shoulder specialist, but has previously followed with an orthopedic specialist, Dr. Lobo Crespo, and a [facility] Rheumatologist who did not treat his shoulders. He advises that his shoulder pain can reach 8/10 in severity but does not require treatment as frequently as his hand pain which he takes medication for during severe episodes.  He has reported no recent falls and is now living alone without balance related issues.   He has been following with wound care at [facility] for his left ankle wound. He presents to the office today walking with a cane for ambulatory assistance.    The patient presents to the clinic on 01/30/2025. He reports his weakness of his upper extremities have been improving since his previous visit. However, he continues to endure other symptoms of arthritis. He also feels that his posture may cause his symptoms of cervical myelopathy. His ROM is affected, and he endures pain. Pain of his neck and bilateral shoulders are worse at the end of the day. He reports that he

## 2025-03-17 ENCOUNTER — HOSPITAL ENCOUNTER (OUTPATIENT)
Dept: CT IMAGING | Age: 70
Discharge: HOME | End: 2025-03-19
Attending: NEUROLOGICAL SURGERY
Payer: MEDICARE

## 2025-03-17 DIAGNOSIS — G95.9 CERVICAL MYELOPATHY (HCC): ICD-10-CM

## 2025-03-17 PROCEDURE — 72125 CT NECK SPINE W/O DYE: CPT

## 2025-05-20 ENCOUNTER — OFFICE VISIT (OUTPATIENT)
Dept: NEUROSURGERY | Age: 70
End: 2025-05-20
Payer: MEDICARE

## 2025-05-20 VITALS
HEIGHT: 67 IN | DIASTOLIC BLOOD PRESSURE: 78 MMHG | BODY MASS INDEX: 22.06 KG/M2 | HEART RATE: 57 BPM | SYSTOLIC BLOOD PRESSURE: 143 MMHG

## 2025-05-20 DIAGNOSIS — G95.9 CERVICAL MYELOPATHY (HCC): ICD-10-CM

## 2025-05-20 DIAGNOSIS — Z98.1 S/P CERVICAL SPINAL FUSION: Primary | ICD-10-CM

## 2025-05-20 PROCEDURE — 1159F MED LIST DOCD IN RCRD: CPT | Performed by: NEUROLOGICAL SURGERY

## 2025-05-20 PROCEDURE — 3077F SYST BP >= 140 MM HG: CPT | Performed by: NEUROLOGICAL SURGERY

## 2025-05-20 PROCEDURE — 99213 OFFICE O/P EST LOW 20 MIN: CPT | Performed by: NEUROLOGICAL SURGERY

## 2025-05-20 PROCEDURE — 3078F DIAST BP <80 MM HG: CPT | Performed by: NEUROLOGICAL SURGERY

## 2025-05-20 PROCEDURE — 1123F ACP DISCUSS/DSCN MKR DOCD: CPT | Performed by: NEUROLOGICAL SURGERY

## 2025-05-20 RX ORDER — FLUTICASONE PROPIONATE 50 MCG
SPRAY, SUSPENSION (ML) NASAL
COMMUNITY
Start: 2025-05-13

## 2025-05-20 NOTE — PROGRESS NOTES
Department of Neurosurgery                                                      Follow up visit      History Obtained From:  patient    CHIEF COMPLAINT:         Chief Complaint   Patient presents with    Follow-up       HISTORY OF PRESENT ILLNESS:       The patient is a 69 y.o. male who presents for follow up for severe cervical spondylosis. He is doing well. His hand strength and dexterity has incrementally improved. He as no neck pain.     PAST MEDICAL HISTORY :       Past Medical History:    History reviewed. No pertinent past medical history.    Past Surgical History:        Procedure Laterality Date    CERVICAL FUSION N/A 1/16/2024    C3-C6 LAMINECTOMY, C3-T1 FUSION performed by Angella Perales DO at [facility] OR    LAMINECTOMY  01/16/2024    c3-c6       Social History:   Social History     Socioeconomic History    Marital status: Single     Spouse name: Not on file    Number of children: Not on file    Years of education: Not on file    Highest education level: Not on file   Occupational History    Not on file   Tobacco Use    Smoking status: Every Day     Types: Cigars    Smokeless tobacco: Never   Vaping Use    Vaping status: Some Days   Substance and Sexual Activity    Alcohol use: Yes     Comment: beer a day    Drug use: Yes     Frequency: 7.0 times per week     Types: Marijuana (Weed)    Sexual activity: Not on file   Other Topics Concern    Not on file   Social History Narrative    Not on file     Social Drivers of Health     Financial Resource Strain: High Risk (8/31/2023)    Received from [facility] [employer]    Overall Financial Resource Strain (CARDIA)     Difficulty of Paying Living Expenses: Hard   Food Insecurity: No Food Insecurity (1/8/2024)    Hunger Vital Sign     Worried About Running Out of Food in the Last Year: Never true     Ran Out of Food in the Last Year: Never true   Transportation Needs: No Transportation Needs (1/8/2024)    PRAPARE -

## 2025-05-27 ENCOUNTER — TRANSCRIBE ORDERS (OUTPATIENT)
Dept: ADMINISTRATIVE | Age: 70
End: 2025-05-27

## 2025-05-27 DIAGNOSIS — L97.321 NON-PRESSURE CHRONIC ULCER OF LEFT ANKLE LIMITED TO BREAKDOWN OF SKIN (HCC): Primary | ICD-10-CM

## 2025-06-04 ENCOUNTER — HOSPITAL ENCOUNTER (OUTPATIENT)
Dept: CT IMAGING | Age: 70
Discharge: HOME | End: 2025-06-06
Payer: MEDICARE

## 2025-06-04 ENCOUNTER — HOSPITAL ENCOUNTER (OUTPATIENT)
Age: 70
Setting detail: SPECIMEN
Discharge: HOME | End: 2025-06-04

## 2025-06-04 DIAGNOSIS — L97.321 NON-PRESSURE CHRONIC ULCER OF LEFT ANKLE LIMITED TO BREAKDOWN OF SKIN (HCC): ICD-10-CM

## 2025-06-04 LAB
ALP SERPL-CCNC: 74 U/L (ref 40–129)
ANION GAP SERPL CALCULATED.3IONS-SCNC: 11 MMOL/L (ref 9–16)
BUN SERPL-MCNC: 18 MG/DL (ref 8–23)
CALCIUM SERPL-MCNC: 8.8 MG/DL (ref 8.6–10.4)
CHLORIDE SERPL-SCNC: 100 MMOL/L (ref 98–107)
CO2 SERPL-SCNC: 24 MMOL/L (ref 20–31)
CREAT BLD-MCNC: 0.7 MG/DL (ref 0.6–1.4)
CREAT SERPL-MCNC: 0.6 MG/DL (ref 0.7–1.2)
GFR, ESTIMATED: >90 ML/MIN/1.73M2
GLUCOSE SERPL-MCNC: 108 MG/DL (ref 74–99)
POTASSIUM SERPL-SCNC: 4 MMOL/L (ref 3.7–5.3)
SODIUM SERPL-SCNC: 135 MMOL/L (ref 136–145)

## 2025-06-04 PROCEDURE — 6360000004 HC RX CONTRAST MEDICATION: Performed by: NURSE PRACTITIONER

## 2025-06-04 PROCEDURE — 73701 CT LOWER EXTREMITY W/DYE: CPT

## 2025-06-04 PROCEDURE — 82565 ASSAY OF CREATININE: CPT

## 2025-06-04 PROCEDURE — 2500000003 HC RX 250 WO HCPCS: Performed by: NURSE PRACTITIONER

## 2025-06-04 PROCEDURE — 2580000003 HC RX 258: Performed by: NURSE PRACTITIONER

## 2025-06-04 RX ORDER — 0.9 % SODIUM CHLORIDE 0.9 %
80 INTRAVENOUS SOLUTION INTRAVENOUS ONCE
Status: COMPLETED | OUTPATIENT
Start: 2025-06-04 | End: 2025-06-04

## 2025-06-04 RX ORDER — IOPAMIDOL 755 MG/ML
75 INJECTION, SOLUTION INTRAVASCULAR
Status: COMPLETED | OUTPATIENT
Start: 2025-06-04 | End: 2025-06-04

## 2025-06-04 RX ORDER — SODIUM CHLORIDE 0.9 % (FLUSH) 0.9 %
10 SYRINGE (ML) INJECTION PRN
Status: DISCONTINUED | OUTPATIENT
Start: 2025-06-04 | End: 2025-06-07 | Stop reason: HOSPADM

## 2025-06-04 RX ADMIN — SODIUM CHLORIDE 80 ML: 9 INJECTION, SOLUTION INTRAVENOUS at 13:00

## 2025-06-04 RX ADMIN — IOPAMIDOL 75 ML: 755 INJECTION, SOLUTION INTRAVENOUS at 13:00

## 2025-06-04 RX ADMIN — SODIUM CHLORIDE, PRESERVATIVE FREE 10 ML: 5 INJECTION INTRAVENOUS at 13:01

## 2025-06-10 ENCOUNTER — OFFICE VISIT (OUTPATIENT)
Dept: ORTHOPEDIC SURGERY | Age: 70
End: 2025-06-10
Payer: MEDICARE

## 2025-06-10 ENCOUNTER — HOSPITAL ENCOUNTER (OUTPATIENT)
Age: 70
Discharge: HOME | End: 2025-06-10
Payer: MEDICARE

## 2025-06-10 ENCOUNTER — RESULTS FOLLOW-UP (OUTPATIENT)
Dept: ORTHOPEDIC SURGERY | Age: 70
End: 2025-06-10

## 2025-06-10 VITALS — WEIGHT: 137 LBS | HEIGHT: 67 IN | BODY MASS INDEX: 21.5 KG/M2

## 2025-06-10 DIAGNOSIS — M25.472 PAIN AND SWELLING OF ANKLE, LEFT: Primary | ICD-10-CM

## 2025-06-10 DIAGNOSIS — Z96.9 RETAINED ORTHOPEDIC HARDWARE: ICD-10-CM

## 2025-06-10 DIAGNOSIS — Z98.890 HISTORY OF ORTHOPEDIC SURGERY: ICD-10-CM

## 2025-06-10 DIAGNOSIS — M25.472 PAIN AND SWELLING OF ANKLE, LEFT: ICD-10-CM

## 2025-06-10 DIAGNOSIS — M25.572 PAIN AND SWELLING OF ANKLE, LEFT: ICD-10-CM

## 2025-06-10 DIAGNOSIS — M25.572 PAIN AND SWELLING OF ANKLE, LEFT: Primary | ICD-10-CM

## 2025-06-10 LAB
ALBUMIN SERPL-MCNC: 4.5 G/DL (ref 3.5–5.2)
ALBUMIN/GLOB SERPL: 1.7 {RATIO} (ref 1–2.5)
ALP SERPL-CCNC: 78 U/L (ref 40–129)
ALT SERPL-CCNC: 25 U/L (ref 10–50)
ANION GAP SERPL CALCULATED.3IONS-SCNC: 12 MMOL/L (ref 9–16)
AST SERPL-CCNC: 27 U/L (ref 10–50)
BASOPHILS # BLD: 0.1 K/UL (ref 0–0.2)
BASOPHILS NFR BLD: 1 % (ref 0–2)
BILIRUB SERPL-MCNC: 0.3 MG/DL (ref 0–1.2)
BUN SERPL-MCNC: 17 MG/DL (ref 8–23)
CALCIUM SERPL-MCNC: 9.1 MG/DL (ref 8.6–10.4)
CHLORIDE SERPL-SCNC: 97 MMOL/L (ref 98–107)
CO2 SERPL-SCNC: 25 MMOL/L (ref 20–31)
CREAT SERPL-MCNC: 0.6 MG/DL (ref 0.7–1.2)
CRP SERPL HS-MCNC: <3 MG/L (ref 0–5)
EOSINOPHIL # BLD: 0 K/UL (ref 0–0.4)
EOSINOPHILS RELATIVE PERCENT: 1 % (ref 1–4)
ERYTHROCYTE [DISTWIDTH] IN BLOOD BY AUTOMATED COUNT: 14.5 % (ref 12.5–15.4)
ERYTHROCYTE [SEDIMENTATION RATE] IN BLOOD BY PHOTOMETRIC METHOD: 7 MM/HR (ref 0–20)
GFR, ESTIMATED: >90 ML/MIN/1.73M2
GLUCOSE SERPL-MCNC: 87 MG/DL (ref 74–99)
HCT VFR BLD AUTO: 39 % (ref 41–53)
HGB BLD-MCNC: 13 G/DL (ref 13.5–17.5)
LYMPHOCYTES NFR BLD: 1.3 K/UL (ref 1–4.8)
LYMPHOCYTES RELATIVE PERCENT: 16 % (ref 24–44)
MCH RBC QN AUTO: 29.5 PG (ref 26–34)
MCHC RBC AUTO-ENTMCNC: 33.3 G/DL (ref 31–37)
MCV RBC AUTO: 88.7 FL (ref 80–100)
MONOCYTES NFR BLD: 0.6 K/UL (ref 0.1–1.2)
MONOCYTES NFR BLD: 7 % (ref 2–11)
NEUTROPHILS NFR BLD: 75 % (ref 36–66)
NEUTS SEG NFR BLD: 5.7 K/UL (ref 1.8–7.7)
PLATELET # BLD AUTO: 435 K/UL (ref 140–450)
PMV BLD AUTO: 6.9 FL (ref 6–12)
POTASSIUM SERPL-SCNC: 3.9 MMOL/L (ref 3.7–5.3)
PROT SERPL-MCNC: 7.1 G/DL (ref 6.6–8.7)
RBC # BLD AUTO: 4.39 M/UL (ref 4.5–5.9)
SODIUM SERPL-SCNC: 134 MMOL/L (ref 136–145)
WBC OTHER # BLD: 7.7 K/UL (ref 3.5–11)

## 2025-06-10 PROCEDURE — 86140 C-REACTIVE PROTEIN: CPT

## 2025-06-10 PROCEDURE — 80053 COMPREHEN METABOLIC PANEL: CPT

## 2025-06-10 PROCEDURE — 85652 RBC SED RATE AUTOMATED: CPT

## 2025-06-10 PROCEDURE — 36415 COLL VENOUS BLD VENIPUNCTURE: CPT

## 2025-06-10 PROCEDURE — 85025 COMPLETE CBC W/AUTO DIFF WBC: CPT

## 2025-06-10 PROCEDURE — 1126F AMNT PAIN NOTED NONE PRSNT: CPT | Performed by: PHYSICIAN ASSISTANT

## 2025-06-10 PROCEDURE — 99214 OFFICE O/P EST MOD 30 MIN: CPT | Performed by: PHYSICIAN ASSISTANT

## 2025-06-10 PROCEDURE — 1123F ACP DISCUSS/DSCN MKR DOCD: CPT | Performed by: PHYSICIAN ASSISTANT

## 2025-06-10 RX ORDER — CEPHALEXIN 500 MG/1
CAPSULE ORAL
COMMUNITY

## 2025-06-10 RX ORDER — HYDROCORTISONE 25 MG/ML
LOTION TOPICAL
COMMUNITY

## 2025-06-10 RX ORDER — CLINDAMYCIN HYDROCHLORIDE 300 MG/1
CAPSULE ORAL
COMMUNITY
Start: 2025-04-24

## 2025-06-10 RX ORDER — ACYCLOVIR 800 MG/1
TABLET ORAL
COMMUNITY

## 2025-06-10 RX ORDER — DOXYCYCLINE 100 MG/1
CAPSULE ORAL
COMMUNITY

## 2025-06-10 RX ORDER — ACETAMINOPHEN 325 MG/1
TABLET ORAL
COMMUNITY

## 2025-06-10 NOTE — PROGRESS NOTES
[facility], [facility] ORTHOPEDICS AND SPORTS MEDICINE  [address] [facility]  [address] [place], [place] [id number]  Dept: [phone]  Dept Fax: [phone]        Ambulatory Follow Up      Subjective:   Moises Triplett is a 69 y.o. year old male who presents to our office today for routine followup regarding his   1. Pain and swelling of ankle, left    2. History of orthopedic surgery    3. Retained orthopedic hardware    .    Chief Complaint   Patient presents with    Ankle Pain     L Ankle         History of Present Illness  The patient is a 69-year-old male who presents today for evaluation of his left ankle. He sustained a fracture to his left ankle in 2022, which was surgically repaired by Dr. Juan Luis Ryan in May 2022. Approximately 2 months ago, he was discharged from [facility] after a year and a half of treatment for a chronic infection.     Recently, a CT scan was obtained by his wound care specialist due to a non-pressure ulcer, which revealed fluid posterior to the Achilles, suspicious for a possible nonliquefying hematoma versus a possible abscess. The wound care specialist recommended a CT contrast scan and lab report, which were conducted last Wednesday. The results indicated fluid accumulation near the Achilles heel, prompting a referral to an orthopedic surgeon.    The patient reports that his ankle wound has been closed for the past 2 weeks, but fluid has been observed in the periwound area. He reports no pain in the ankle but noted swelling last night. He maintains good mobility in the ankle and walked extensively yesterday. He also reports no fevers, chills, nausea, vomiting, or diarrhea. He has been managing the wound with a bandage and adhesive pad to prevent friction. He is not diabetic and is currently on antiplatelet therapy, although he reports inconsistent use.      PAST SURGICAL HISTORY:  Left ankle ORIF in 05/2022      SOCIAL

## 2025-06-11 ENCOUNTER — TELEPHONE (OUTPATIENT)
Dept: ORTHOPEDIC SURGERY | Age: 70
End: 2025-06-11

## 2025-06-11 NOTE — TELEPHONE ENCOUNTER
Attempted to call patient to relay the bloodwork results but was unable as VM was full. Amanda suggests following up with Dr Crespo at next appt already scheduled.

## 2025-06-12 ENCOUNTER — OFFICE VISIT (OUTPATIENT)
Dept: ORTHOPEDIC SURGERY | Age: 70
End: 2025-06-12
Payer: MEDICARE

## 2025-06-12 VITALS — HEIGHT: 67 IN | WEIGHT: 137 LBS | BODY MASS INDEX: 21.5 KG/M2 | RESPIRATION RATE: 15 BRPM | OXYGEN SATURATION: 98 %

## 2025-06-12 DIAGNOSIS — Z96.9 RETAINED ORTHOPEDIC HARDWARE: ICD-10-CM

## 2025-06-12 DIAGNOSIS — M25.572 LEFT ANKLE PAIN, UNSPECIFIED CHRONICITY: Primary | ICD-10-CM

## 2025-06-12 DIAGNOSIS — M86.672 CHRONIC OSTEOMYELITIS INVOLVING LEFT ANKLE AND FOOT (HCC): Primary | ICD-10-CM

## 2025-06-12 PROCEDURE — 1123F ACP DISCUSS/DSCN MKR DOCD: CPT | Performed by: ORTHOPAEDIC SURGERY

## 2025-06-12 PROCEDURE — 1159F MED LIST DOCD IN RCRD: CPT | Performed by: ORTHOPAEDIC SURGERY

## 2025-06-12 PROCEDURE — 99214 OFFICE O/P EST MOD 30 MIN: CPT | Performed by: ORTHOPAEDIC SURGERY

## 2025-06-12 PROCEDURE — 1126F AMNT PAIN NOTED NONE PRSNT: CPT | Performed by: ORTHOPAEDIC SURGERY

## 2025-06-12 ASSESSMENT — ENCOUNTER SYMPTOMS
COLOR CHANGE: 0
SHORTNESS OF BREATH: 0
VOMITING: 0
COUGH: 0

## 2025-06-12 NOTE — PROGRESS NOTES
[facility] ORTHOPEDICS AND SPORTS MEDICINE  [address] [facility]  [address] [place], [place] [address]  Dept: [phone]  Dept Fax: [phone]        Ambulatory Follow Up      Subjective:   Moises Triplett is a 69 y.o. year old male who presents to our office today for routine followup regarding his   1. 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 Retained orthopedic hardware    .    Chief Complaint   Patient presents with    Ankle Pain     Left ankle pain       History of Present Illness  The patient is a 69-year-old male who presents for evaluation of left ankle pain.    He underwent surgery on his left ankle on 05/10/2022, performed by Dr. Juan Luis MILLER, following a fracture that occurred the previous day. Post-surgery, he was treated with antibiotics for cellulitis. In 11/2022, it was discovered that the plate had become encrusted, necessitating its removal. The wound subsequently became infected but healed by 02/2023. However, an adhesive applied by a student nurse led to a periwound infection within 24 hours. A debridement procedure was performed in 04/2023.    Approximately 2.5 months ago, he was discharged from [facility], but the wound began to leak. A culture taken at home revealed MRSA, for which he was treated with clindamycin for a week, resulting in cessation of the leakage. He has been off antibiotics for the past 3 to 4 weeks. A wound care specialist from [place] was called in due to the leakage, and a CT scan and blood test were ordered. The last instance of drainage was on Tuesday morning.    He reports no pain during ambulation and uses a cane for support. He also mentions that the wound care team identified a pocket of fluid near the Achilles tendon on imaging done a week ago Wednesday. He expresses concern about the need for further surgery and the potential removal of the screws. He has been on antibiotics for 2.5

## 2025-06-12 NOTE — PATIENT INSTRUCTIONS
PATIENTIQ:  PatientIQ helps [facility] stay in touch with you to know how you're feeling, and provides education and care instructions to you at various time points.   Your answers help your care team track your progress to provide the best care possible. PatientIQ will contact you pre-op and post-op via email or text with:  Educational Videos and Care Instructions  Questionnaires About How You're Feeling    Your participation provides you valuable education and helps [facility] continue to provide quality care to all patients. Thank you

## 2025-06-13 ENCOUNTER — TELEPHONE (OUTPATIENT)
Dept: INTERVENTIONAL RADIOLOGY/VASCULAR | Age: 70
End: 2025-06-13

## 2025-06-16 ENCOUNTER — OFFICE VISIT (OUTPATIENT)
Dept: INFECTIOUS DISEASES | Age: 70
End: 2025-06-16
Payer: MEDICARE

## 2025-06-16 VITALS
HEIGHT: 70 IN | SYSTOLIC BLOOD PRESSURE: 123 MMHG | TEMPERATURE: 98 F | DIASTOLIC BLOOD PRESSURE: 76 MMHG | BODY MASS INDEX: 18.61 KG/M2 | HEART RATE: 75 BPM | WEIGHT: 130 LBS | OXYGEN SATURATION: 98 %

## 2025-06-16 DIAGNOSIS — M86.372 CHRONIC MULTIFOCAL OSTEOMYELITIS OF LEFT ANKLE (HCC): Primary | ICD-10-CM

## 2025-06-16 DIAGNOSIS — A49.02 MRSA (METHICILLIN RESISTANT STAPHYLOCOCCUS AUREUS) INFECTION: ICD-10-CM

## 2025-06-16 PROCEDURE — G2211 COMPLEX E/M VISIT ADD ON: HCPCS | Performed by: INTERNAL MEDICINE

## 2025-06-16 PROCEDURE — 1123F ACP DISCUSS/DSCN MKR DOCD: CPT | Performed by: INTERNAL MEDICINE

## 2025-06-16 PROCEDURE — 1159F MED LIST DOCD IN RCRD: CPT | Performed by: INTERNAL MEDICINE

## 2025-06-16 PROCEDURE — 99214 OFFICE O/P EST MOD 30 MIN: CPT | Performed by: INTERNAL MEDICINE

## 2025-06-16 PROCEDURE — 3078F DIAST BP <80 MM HG: CPT | Performed by: INTERNAL MEDICINE

## 2025-06-16 PROCEDURE — 3074F SYST BP LT 130 MM HG: CPT | Performed by: INTERNAL MEDICINE

## 2025-06-16 ASSESSMENT — ENCOUNTER SYMPTOMS
APNEA: 0
EYE DISCHARGE: 0
ABDOMINAL DISTENTION: 0
COLOR CHANGE: 0

## 2025-06-16 NOTE — PROGRESS NOTES
[facility] - Initial Consult Note  Today's Date: 6/16/2025    Impression :   Post STV visit 1/12/24 - seen office first visit 6/16/25  C spine stenosis w  multiple falls  S/p cervical spine surgery on 1/16/2024 w fusion  I got involved due to concern of an open wound external malleole in left ankle and fear of C spine wound super infection-  Hx of R ankle past ORIF 5/1/2022 w chronic osteomyelitis  - hardware removed 11/2022  external malleole Wound closed 2/15/23  But Persistent open ankle wound extern malleole  Saw Dr Amado [facility]  Bacteriae was MRSA S doxy, w a superficial pseudomonas that did not need to be treated,  - keflex x few weeks - stopped  Dr Mendez I/D to the bone 4/2023   Back w cellulitis [facility],  I/D 6/16/23 / daptomycin 4 weeks for MRSA Dr Amado - then AB done  Then c diff 6/30/23  Still has a left ankle open wound as post op - no AB needed - wound might take a while to close  Now has 2 screws  internal malleole ie other side of the infection, and no plate- has been off AB x 7/2023  [place]    Keep wound dressed and clean  Keep nasal mupirocin x 14 days  Off AB  Office visit 6/16/25   pt elected to come and see me rather - left knee persistent drainage from left ankle old surg wound - referred by Dr SHAHEEN Crespo who was planning on drainage of a collection found on CT in front of the achilles tendon w poor healing of the fibular fracture despite the pt walking on w no deformation  Recent cx from ankle by home RN,  4/2025 showed MRSA ( S clinda and doxy)  and given clinda short course , wound closed and still drains on off as a deep fistula track  6/16/25 discussed w Dr Crespo, ordered IR to aspirate and Dr Ray to take over and Dr Crespo retiring -  Gets 6/17/25 IR aspiration for cx - we ll address as per cx, and favor the po AB if possible - iv otherwise  if po not working  6/16/25 left late malleole swelling locally, not warm and a deep fistula

## 2025-06-17 ENCOUNTER — HOSPITAL ENCOUNTER (OUTPATIENT)
Dept: ULTRASOUND IMAGING | Age: 70
Discharge: HOME | End: 2025-06-19
Payer: MEDICARE

## 2025-06-17 DIAGNOSIS — M86.672 CHRONIC OSTEOMYELITIS INVOLVING LEFT ANKLE AND FOOT (HCC): ICD-10-CM

## 2025-06-17 PROCEDURE — 10030 IMG GID FLU COLL DRG SFT TIS: CPT

## 2025-06-17 PROCEDURE — 87075 CULTR BACTERIA EXCEPT BLOOD: CPT

## 2025-06-17 PROCEDURE — 87205 SMEAR GRAM STAIN: CPT

## 2025-06-17 PROCEDURE — 86403 PARTICLE AGGLUT ANTBDY SCRN: CPT

## 2025-06-17 PROCEDURE — 87070 CULTURE OTHR SPECIMN AEROBIC: CPT

## 2025-06-17 PROCEDURE — 87186 SC STD MICRODIL/AGAR DIL: CPT

## 2025-06-17 NOTE — PROGRESS NOTES
Pt presents to IR for aspirate of L achilles abscess  Consent obtained  KT Dr. Ananth SALTER to bedside  Timeout performed  Site prepped and draped, pt laying R side  Access obtained, scant clear red fluid <1cc removed  Site deaccessed, covered with DSD  Order for culture per Dr. Heard note - collected and sent to lab  Pt tolerated well, discharged home

## 2025-06-17 NOTE — PATIENT INSTRUCTIONS
06/17/2025 PATIENT SENT BACK WITH AVS AND DR SILVA'S OFFICE NOTE P/O DR SILVA WITH HER RECOMMENDATIONS HIGHLIGHTED. CARLB

## 2025-06-20 LAB
MICROORGANISM SPEC CULT: ABNORMAL
MICROORGANISM SPEC CULT: ABNORMAL
MICROORGANISM/AGENT SPEC: ABNORMAL
MICROORGANISM/AGENT SPEC: ABNORMAL
SPECIMEN DESCRIPTION: ABNORMAL

## 2025-06-24 ENCOUNTER — OFFICE VISIT (OUTPATIENT)
Dept: INFECTIOUS DISEASES | Age: 70
End: 2025-06-24
Payer: MEDICARE

## 2025-06-24 VITALS
WEIGHT: 132.8 LBS | DIASTOLIC BLOOD PRESSURE: 79 MMHG | HEART RATE: 75 BPM | HEIGHT: 70 IN | SYSTOLIC BLOOD PRESSURE: 139 MMHG | OXYGEN SATURATION: 98 % | RESPIRATION RATE: 18 BRPM | BODY MASS INDEX: 19.01 KG/M2

## 2025-06-24 DIAGNOSIS — M86.371 CHRONIC MULTIFOCAL OSTEOMYELITIS OF RIGHT ANKLE (HCC): Primary | ICD-10-CM

## 2025-06-24 DIAGNOSIS — A49.02 MRSA INFECTION: ICD-10-CM

## 2025-06-24 PROCEDURE — 3078F DIAST BP <80 MM HG: CPT | Performed by: INTERNAL MEDICINE

## 2025-06-24 PROCEDURE — 99214 OFFICE O/P EST MOD 30 MIN: CPT | Performed by: INTERNAL MEDICINE

## 2025-06-24 PROCEDURE — 3075F SYST BP GE 130 - 139MM HG: CPT | Performed by: INTERNAL MEDICINE

## 2025-06-24 PROCEDURE — 1123F ACP DISCUSS/DSCN MKR DOCD: CPT | Performed by: INTERNAL MEDICINE

## 2025-06-24 PROCEDURE — G2211 COMPLEX E/M VISIT ADD ON: HCPCS | Performed by: INTERNAL MEDICINE

## 2025-06-24 RX ORDER — DOXYCYCLINE HYCLATE 100 MG
100 TABLET ORAL 2 TIMES DAILY
Qty: 180 TABLET | Refills: 3 | Status: SHIPPED | OUTPATIENT
Start: 2025-06-24 | End: 2025-09-22

## 2025-06-24 RX ORDER — LINEZOLID 600 MG/1
600 TABLET, FILM COATED ORAL 2 TIMES DAILY
Qty: 28 TABLET | Refills: 0 | Status: SHIPPED | OUTPATIENT
Start: 2025-06-24 | End: 2025-06-24

## 2025-06-24 RX ORDER — LINEZOLID 600 MG/1
600 TABLET, FILM COATED ORAL 2 TIMES DAILY
Qty: 28 TABLET | Refills: 0 | Status: SHIPPED | OUTPATIENT
Start: 2025-06-24 | End: 2025-07-08

## 2025-06-24 ASSESSMENT — ENCOUNTER SYMPTOMS
COLOR CHANGE: 0
APNEA: 0
PHOTOPHOBIA: 0
EYE REDNESS: 0
ABDOMINAL DISTENTION: 0
EYE DISCHARGE: 0

## 2025-06-24 NOTE — PROGRESS NOTES
[facility] - Initial Consult Note  Today's Date: 6/24/2025    Impression :   Post STV visit 1/12/24 - seen office first visit 6/16/25  C spine stenosis w  multiple falls  S/p cervical spine surgery on 1/16/2024 w fusion  I got involved due to concern of an open wound external malleole in left ankle and fear of C spine wound super infection-  Hx of R ankle past ORIF 5/1/2022 w chronic osteomyelitis  - hardware removed 11/2022  external malleole Wound closed 2/15/23  But Persistent open ankle wound extern malleole  Saw Dr Amado [facility]  Bacteriae was MRSA S doxy, w a superficial pseudomonas that did not need to be treated,  - keflex x few weeks - stopped  Dr Mendez I/D to the bone 4/2023   Back w cellulitis [facility],  I/D 6/16/23 / daptomycin 4 weeks for MRSA Dr Amado - then AB done  Then c diff 6/30/23  Still has a left ankle open wound as post op - no AB needed - wound might take a while to close  Now has 2 screws  internal malleole ie other side of the infection, and no plate- has been off AB x 7/2023  Plan  [place]23/24 at [place] was    Keep wound dressed and clean  Keep nasal mupirocin x 14 days  Off AB  Office visit 6/16/25   pt elected to come and see me rather - left knee persistent drainage from left ankle old surg wound - referred by Dr SHAHEEN Crespo who was planning on drainage of a collection found on CT in front of the achilles tendon w poor healing of the fibular fracture despite the pt walking on w no deformation  Recent cx from ankle by home RN,  4/2025 showed MRSA ( S clinda and doxy)  and given clinda short course , wound closed and still drains on off as a deep fistula track  6/16/25 discussed w Dr Crespo, ordered IR to aspirate and Dr Ray to take over and Dr Crespo retiring -  Gets 6/17/25 IR aspiration for cx - we ll address as per cx, and favor the po AB if possible - iv otherwise  if po not working  6/16/25 left late malleole swelling locally, not warm and a deep fistula

## 2025-07-07 ENCOUNTER — OFFICE VISIT (OUTPATIENT)
Dept: ORTHOPEDIC SURGERY | Age: 70
End: 2025-07-07
Payer: MEDICARE

## 2025-07-07 VITALS — BODY MASS INDEX: 18.9 KG/M2 | HEIGHT: 70 IN | WEIGHT: 132 LBS

## 2025-07-07 DIAGNOSIS — M86.672 CHRONIC OSTEOMYELITIS INVOLVING LEFT ANKLE AND FOOT (HCC): Primary | ICD-10-CM

## 2025-07-07 PROCEDURE — 1123F ACP DISCUSS/DSCN MKR DOCD: CPT | Performed by: STUDENT IN AN ORGANIZED HEALTH CARE EDUCATION/TRAINING PROGRAM

## 2025-07-07 PROCEDURE — 1126F AMNT PAIN NOTED NONE PRSNT: CPT | Performed by: STUDENT IN AN ORGANIZED HEALTH CARE EDUCATION/TRAINING PROGRAM

## 2025-07-07 PROCEDURE — 99214 OFFICE O/P EST MOD 30 MIN: CPT | Performed by: STUDENT IN AN ORGANIZED HEALTH CARE EDUCATION/TRAINING PROGRAM

## 2025-07-07 PROCEDURE — 1159F MED LIST DOCD IN RCRD: CPT | Performed by: STUDENT IN AN ORGANIZED HEALTH CARE EDUCATION/TRAINING PROGRAM

## 2025-07-07 NOTE — PROGRESS NOTES
for 10 days.      cephALEXin (KEFLEX) 500 MG capsule TAKE 1 CAPSULE BY MOUTH EVERY 6 HOURS FOR 5 DAYS      clindamycin (CLEOCIN) 300 MG capsule Take 1 capsule every 8 hours by oral route for 7 days.      doxycycline hyclate (VIBRAMYCIN) 100 MG capsule Take 1 capsule by mouth every 12 hours for 7 days      hydrocortisone (HYTONE) 2.5 % lotion APPLY A THIN LAYER TO THE AFFECTED AREA(S) BY TOPICAL ROUTE ONCE DAILY      fluticasone (FLONASE) 50 MCG/ACT nasal spray       clopidogrel (PLAVIX) 75 MG tablet Take 1 tablet by mouth      ibuprofen (ADVIL;MOTRIN) 600 MG tablet Take 1 tablet by mouth 3 times daily as needed for Pain 60 tablet 2    oxyCODONE-acetaminophen (PERCOCET) 5-325 MG per tablet Take 2 tablets by mouth every 4 hours as needed for Pain.      lisinopril (PRINIVIL;ZESTRIL) 10 MG tablet Take 1 tablet by mouth daily (Patient not taking: Reported on 6/24/2025) 30 tablet 3    carvedilol (COREG) 6.25 MG tablet Take 1 tablet by mouth 2 times daily (with meals) (Patient not taking: Reported on 6/24/2025) 60 tablet 3    atorvastatin (LIPITOR) 10 MG tablet Take 1 tablet by mouth at bedtime       No current facility-administered medications for this visit.       Allergies:    Latex, Adhesive tape, Ciprofloxacin, and Sulfa antibiotics    Social History:   Social History     Socioeconomic History    Marital status: Single     Spouse name: Not on file    Number of children: Not on file    Years of education: Not on file    Highest education level: Not on file   Occupational History    Not on file   Tobacco Use    Smoking status: Every Day     Types: Cigars    Smokeless tobacco: Never   Vaping Use    Vaping status: Some Days   Substance and Sexual Activity    Alcohol use: Yes     Comment: beer a day    Drug use: Yes     Frequency: 7.0 times per week     Types: Marijuana (Weed)    Sexual activity: Not on file   Other Topics Concern    Not on file   Social History Narrative    Not on file     Social Drivers of Health

## 2025-08-13 ENCOUNTER — OFFICE VISIT (OUTPATIENT)
Dept: INFECTIOUS DISEASES | Age: 70
End: 2025-08-13

## 2025-08-13 VITALS
HEART RATE: 72 BPM | OXYGEN SATURATION: 97 % | BODY MASS INDEX: 18.61 KG/M2 | HEIGHT: 70 IN | DIASTOLIC BLOOD PRESSURE: 84 MMHG | WEIGHT: 130 LBS | RESPIRATION RATE: 16 BRPM | SYSTOLIC BLOOD PRESSURE: 121 MMHG

## 2025-08-13 DIAGNOSIS — A49.02 MRSA INFECTION: ICD-10-CM

## 2025-08-13 DIAGNOSIS — M86.672 CHRONIC OSTEOMYELITIS INVOLVING LEFT ANKLE AND FOOT (HCC): Primary | ICD-10-CM

## 2025-08-13 ASSESSMENT — ENCOUNTER SYMPTOMS
ABDOMINAL DISTENTION: 0
EYE DISCHARGE: 0
COLOR CHANGE: 0
EYE REDNESS: 0
PHOTOPHOBIA: 0
APNEA: 0

## (undated) DEVICE — STANDARD HYPODERMIC NEEDLE,ALUMINUM HUB: Brand: MONOJECT

## (undated) DEVICE — AGENT HEMOSTATIC SURGIFLOW MATRIX KIT W/THROMBIN

## (undated) DEVICE — BLADE ES ELASTOMERIC COAT INSUL DURABLE BEND UPTO 90DEG

## (undated) DEVICE — LIQUIBAND RAPID ADHESIVE 36/CS 0.8ML: Brand: MEDLINE

## (undated) DEVICE — SPONGE LAP W18XL18IN WHT COT 4 PLY FLD STRUNG RADPQ DISP ST 2 PER PACK

## (undated) DEVICE — DRESSING BORDERED ADH GZ UNIV GEN USE 8INX4IN AND 6INX2IN

## (undated) DEVICE — SUTURE STRATAFIX SYMMETRIC PDS + SZ 0 L18IN ABSRB L36MM SXPP1A401

## (undated) DEVICE — SUTURE VCRL SZ 3-0 L18IN ABSRB UD L26MM SH 1/2 CIR J864D

## (undated) DEVICE — SUTURE VCRL SZ 0 L18IN ABSRB UD L36MM CT-1 1/2 CIR J840D

## (undated) DEVICE — STVZ LUMBAR SPINE PACK: Brand: MEDLINE INDUSTRIES, INC.

## (undated) DEVICE — SYRINGE,CONTROL,LL,FINGER,GRIP: Brand: MEDLINE INDUSTRIES, INC.

## (undated) DEVICE — ELECTRODE PT RET AD L9FT HI MOIST COND ADH HYDRGEL CORDED

## (undated) DEVICE — BLADE,CARBON-STEEL,10,STRL,DISPOSABLE: Brand: MEDLINE

## (undated) DEVICE — ROD 3603770 PRE-CUT 3.5MM X 70MM
Type: IMPLANTABLE DEVICE | Site: NECK | Status: NON-FUNCTIONAL
Brand: INFINITY™ OCCIPITOCERVICAL UPPER THORACIC SYSTEM

## (undated) DEVICE — YANKAUER,FLEXIBLE HANDLE,REGLR CAPACITY: Brand: MEDLINE INDUSTRIES, INC.

## (undated) DEVICE — 3.0MM PRECISION NEURO (MATCH HEAD)

## (undated) DEVICE — BLADE ES L4IN INSUL EDGE

## (undated) DEVICE — DRAPE,THYROID,SOFT,STERILE: Brand: MEDLINE

## (undated) DEVICE — 1LYRTR 16FR10ML100%SIL UMS SNP: Brand: MEDLINE INDUSTRIES, INC.

## (undated) DEVICE — SYRINGE, LUER LOCK, 10ML: Brand: MEDLINE

## (undated) DEVICE — GLOVE SURG SZ 7 L12IN THK7.5MIL DK GRN LTX FREE MSG6570] MEDLINE INDUSTRIES INC]

## (undated) DEVICE — THE MILL DISPOSABLE - MEDIUM

## (undated) DEVICE — 3M™ IOBAN™ 2 ANTIMICROBIAL INCISE DRAPE 6650EZ: Brand: IOBAN™ 2

## (undated) DEVICE — DRAPE,REIN 53X77,STERILE: Brand: MEDLINE

## (undated) DEVICE — C-ARM: Brand: UNBRANDED

## (undated) DEVICE — APPLICATOR MEDICATED 10.5 CC SOLUTION HI LT ORNG CHLORAPREP

## (undated) DEVICE — SPONGE,NEURO,1"X1",XR,STRL,LF,10/PK: Brand: MEDLINE

## (undated) DEVICE — INTENDED FOR TISSUE SEPARATION, AND OTHER PROCEDURES THAT REQUIRE A SHARP SURGICAL BLADE TO PUNCTURE OR CUT.: Brand: BARD-PARKER ® CARBON RIB-BACK BLADES

## (undated) DEVICE — SUTURE ETHLN SZ 3-0 L18IN NONABSORBABLE BLK L24MM PS-1 3/8 1663G

## (undated) DEVICE — DRESSING TRNSPAR W5XL4.5IN FLM SHT SEMIPERMEABLE WIND

## (undated) DEVICE — PROTECTOR ULN NRV PUR FOAM HK LOOP STRP ANATOMICALLY

## (undated) DEVICE — STRAP ARMBRD W1.5XL32IN FOAM STR YET SFT W/ HK AND LOOP

## (undated) DEVICE — TOWEL SURG SM W12XL18IN CLR PLAS TEAR RESIST REINF ADH FRST

## (undated) DEVICE — STRAP,POSITIONING,KNEE/BODY,FOAM,4X60": Brand: MEDLINE

## (undated) DEVICE — COVER,MAYO STAND,STERILE: Brand: MEDLINE

## (undated) DEVICE — C-ARMOR C-ARM EQUIPMENT COVERS CLEAR STERILE UNIVERSAL FIT 12 PER CASE: Brand: C-ARMOR

## (undated) DEVICE — MARKER,SKIN,WI/RULER AND LABELS: Brand: MEDLINE

## (undated) DEVICE — Device

## (undated) DEVICE — DRILL BIT G3606010 2.4MM

## (undated) DEVICE — POUCH INSTR W6.75XL11.5IN FRST 2 PKT ADH FOR ORTH AND

## (undated) DEVICE — COLLAR CERV UNIV AD L13-19IN TRACH OPN TWO PC RIG POLYETH

## (undated) DEVICE — TUBING, SUCTION, 9/32" X 20', STRAIGHT: Brand: MEDLINE INDUSTRIES, INC.

## (undated) DEVICE — SUTURE VCRL SZ 2-0 L18IN ABSRB UD CT-1 L36MM 1/2 CIR J839D

## (undated) DEVICE — PAD,NON-ADHERENT,3X8,STERILE,LF,1/PK: Brand: MEDLINE